# Patient Record
Sex: MALE | Race: WHITE | Employment: OTHER | URBAN - METROPOLITAN AREA
[De-identification: names, ages, dates, MRNs, and addresses within clinical notes are randomized per-mention and may not be internally consistent; named-entity substitution may affect disease eponyms.]

---

## 2020-01-01 ENCOUNTER — APPOINTMENT (OUTPATIENT)
Dept: MRI IMAGING | Age: 80
DRG: 291 | End: 2020-01-01
Attending: INTERNAL MEDICINE
Payer: MEDICARE

## 2020-01-01 ENCOUNTER — APPOINTMENT (OUTPATIENT)
Dept: NON INVASIVE DIAGNOSTICS | Age: 80
DRG: 291 | End: 2020-01-01
Attending: INTERNAL MEDICINE
Payer: MEDICARE

## 2020-01-01 ENCOUNTER — PATIENT OUTREACH (OUTPATIENT)
Dept: CASE MANAGEMENT | Age: 80
End: 2020-01-01

## 2020-01-01 ENCOUNTER — HOME CARE VISIT (OUTPATIENT)
Dept: HOME HEALTH SERVICES | Facility: HOME HEALTH | Age: 80
End: 2020-01-01
Payer: MEDICARE

## 2020-01-01 ENCOUNTER — HOME CARE VISIT (OUTPATIENT)
Dept: SCHEDULING | Facility: HOME HEALTH | Age: 80
End: 2020-01-01
Payer: MEDICARE

## 2020-01-01 ENCOUNTER — HOME HEALTH ADMISSION (OUTPATIENT)
Dept: HOME HEALTH SERVICES | Facility: HOME HEALTH | Age: 80
End: 2020-01-01
Payer: MEDICARE

## 2020-01-01 ENCOUNTER — ANESTHESIA EVENT (OUTPATIENT)
Dept: ENDOSCOPY | Age: 80
DRG: 291 | End: 2020-01-01
Payer: MEDICARE

## 2020-01-01 ENCOUNTER — APPOINTMENT (OUTPATIENT)
Dept: CT IMAGING | Age: 80
DRG: 291 | End: 2020-01-01
Attending: EMERGENCY MEDICINE
Payer: MEDICARE

## 2020-01-01 ENCOUNTER — APPOINTMENT (OUTPATIENT)
Dept: GENERAL RADIOLOGY | Age: 80
DRG: 291 | End: 2020-01-01
Attending: EMERGENCY MEDICINE
Payer: MEDICARE

## 2020-01-01 ENCOUNTER — HOSPITAL ENCOUNTER (INPATIENT)
Age: 80
LOS: 25 days | Discharge: HOME HEALTH | DRG: 291 | End: 2020-11-23
Attending: EMERGENCY MEDICINE | Admitting: INTERNAL MEDICINE
Payer: MEDICARE

## 2020-01-01 ENCOUNTER — ANESTHESIA (OUTPATIENT)
Dept: ENDOSCOPY | Age: 80
DRG: 291 | End: 2020-01-01
Payer: MEDICARE

## 2020-01-01 ENCOUNTER — APPOINTMENT (OUTPATIENT)
Dept: CT IMAGING | Age: 80
DRG: 291 | End: 2020-01-01
Attending: STUDENT IN AN ORGANIZED HEALTH CARE EDUCATION/TRAINING PROGRAM
Payer: MEDICARE

## 2020-01-01 ENCOUNTER — APPOINTMENT (OUTPATIENT)
Dept: ULTRASOUND IMAGING | Age: 80
DRG: 291 | End: 2020-01-01
Attending: INTERNAL MEDICINE
Payer: MEDICARE

## 2020-01-01 ENCOUNTER — TELEPHONE (OUTPATIENT)
Dept: FAMILY MEDICINE CLINIC | Age: 80
End: 2020-01-01

## 2020-01-01 VITALS
WEIGHT: 153 LBS | BODY MASS INDEX: 22.66 KG/M2 | HEART RATE: 111 BPM | SYSTOLIC BLOOD PRESSURE: 123 MMHG | TEMPERATURE: 97.8 F | OXYGEN SATURATION: 93 % | DIASTOLIC BLOOD PRESSURE: 38 MMHG | RESPIRATION RATE: 17 BRPM | HEIGHT: 69 IN

## 2020-01-01 VITALS
TEMPERATURE: 97 F | DIASTOLIC BLOOD PRESSURE: 78 MMHG | RESPIRATION RATE: 18 BRPM | HEART RATE: 69 BPM | OXYGEN SATURATION: 98 % | SYSTOLIC BLOOD PRESSURE: 128 MMHG

## 2020-01-01 DIAGNOSIS — Z71.89 GOALS OF CARE, COUNSELING/DISCUSSION: ICD-10-CM

## 2020-01-01 DIAGNOSIS — R41.0 CONFUSION: ICD-10-CM

## 2020-01-01 DIAGNOSIS — I48.91 ATRIAL FIBRILLATION WITH RAPID VENTRICULAR RESPONSE (HCC): Primary | ICD-10-CM

## 2020-01-01 DIAGNOSIS — R41.82 ALTERED MENTAL STATUS, UNSPECIFIED ALTERED MENTAL STATUS TYPE: ICD-10-CM

## 2020-01-01 DIAGNOSIS — K92.2 GASTROINTESTINAL HEMORRHAGE, UNSPECIFIED GASTROINTESTINAL HEMORRHAGE TYPE: ICD-10-CM

## 2020-01-01 DIAGNOSIS — M79.601 PAIN OF RIGHT UPPER EXTREMITY: ICD-10-CM

## 2020-01-01 LAB
ABO + RH BLD: NORMAL
ALBUMIN SERPL-MCNC: 2.7 G/DL (ref 3.4–5)
ALBUMIN SERPL-MCNC: 2.8 G/DL (ref 3.4–5)
ALBUMIN SERPL-MCNC: 2.9 G/DL (ref 3.4–5)
ALBUMIN SERPL-MCNC: 3 G/DL (ref 3.4–5)
ALBUMIN SERPL-MCNC: 3 G/DL (ref 3.4–5)
ALBUMIN SERPL-MCNC: 3.1 G/DL (ref 3.4–5)
ALBUMIN SERPL-MCNC: 3.6 G/DL (ref 3.4–5)
ALBUMIN/GLOB SERPL: 0.9 {RATIO} (ref 0.8–1.7)
ALBUMIN/GLOB SERPL: 1 {RATIO} (ref 0.8–1.7)
ALBUMIN/GLOB SERPL: 1.1 {RATIO} (ref 0.8–1.7)
ALP SERPL-CCNC: 53 U/L (ref 45–117)
ALP SERPL-CCNC: 53 U/L (ref 45–117)
ALP SERPL-CCNC: 54 U/L (ref 45–117)
ALP SERPL-CCNC: 65 U/L (ref 45–117)
ALP SERPL-CCNC: 73 U/L (ref 45–117)
ALP SERPL-CCNC: 89 U/L (ref 45–117)
ALP SERPL-CCNC: 90 U/L (ref 45–117)
ALT SERPL-CCNC: 13 U/L (ref 16–61)
ALT SERPL-CCNC: 13 U/L (ref 16–61)
ALT SERPL-CCNC: 14 U/L (ref 16–61)
ALT SERPL-CCNC: 16 U/L (ref 16–61)
ALT SERPL-CCNC: 17 U/L (ref 16–61)
ALT SERPL-CCNC: 18 U/L (ref 16–61)
ALT SERPL-CCNC: 20 U/L (ref 16–61)
AMMONIA PLAS-SCNC: 22 UMOL/L (ref 11–32)
AMMONIA PLAS-SCNC: 41 UMOL/L (ref 11–32)
ANION GAP SERPL CALC-SCNC: 4 MMOL/L (ref 3–18)
ANION GAP SERPL CALC-SCNC: 5 MMOL/L (ref 3–18)
ANION GAP SERPL CALC-SCNC: 6 MMOL/L (ref 3–18)
ANION GAP SERPL CALC-SCNC: 7 MMOL/L (ref 3–18)
ANION GAP SERPL CALC-SCNC: 8 MMOL/L (ref 3–18)
APPEARANCE UR: CLEAR
APTT PPP: 23.1 SEC (ref 23–36.4)
AST SERPL-CCNC: 10 U/L (ref 10–38)
AST SERPL-CCNC: 11 U/L (ref 10–38)
AST SERPL-CCNC: 13 U/L (ref 10–38)
AST SERPL-CCNC: 29 U/L (ref 10–38)
AST SERPL-CCNC: 32 U/L (ref 10–38)
AST SERPL-CCNC: 32 U/L (ref 10–38)
AST SERPL-CCNC: 9 U/L (ref 10–38)
ATRIAL RATE: 104 BPM
ATRIAL RATE: 90 BPM
BACTERIA SPEC CULT: NORMAL
BACTERIA URNS QL MICRO: ABNORMAL /HPF
BACTERIA URNS QL MICRO: NEGATIVE /HPF
BASOPHILS # BLD: 0 K/UL (ref 0–0.1)
BASOPHILS NFR BLD: 0 % (ref 0–2)
BASOPHILS NFR BLD: 1 % (ref 0–2)
BILIRUB SERPL-MCNC: 0.6 MG/DL (ref 0.2–1)
BILIRUB SERPL-MCNC: 0.7 MG/DL (ref 0.2–1)
BILIRUB SERPL-MCNC: 0.8 MG/DL (ref 0.2–1)
BILIRUB SERPL-MCNC: 0.8 MG/DL (ref 0.2–1)
BILIRUB SERPL-MCNC: 0.9 MG/DL (ref 0.2–1)
BILIRUB SERPL-MCNC: 0.9 MG/DL (ref 0.2–1)
BILIRUB SERPL-MCNC: 1 MG/DL (ref 0.2–1)
BILIRUB UR QL: NEGATIVE
BLOOD GROUP ANTIBODIES SERPL: NORMAL
BNP SERPL-MCNC: 5543 PG/ML (ref 0–1800)
BNP SERPL-MCNC: 9996 PG/ML (ref 0–1800)
BUN SERPL-MCNC: 17 MG/DL (ref 7–18)
BUN SERPL-MCNC: 18 MG/DL (ref 7–18)
BUN SERPL-MCNC: 18 MG/DL (ref 7–18)
BUN SERPL-MCNC: 23 MG/DL (ref 7–18)
BUN SERPL-MCNC: 24 MG/DL (ref 7–18)
BUN SERPL-MCNC: 32 MG/DL (ref 7–18)
BUN SERPL-MCNC: 36 MG/DL (ref 7–18)
BUN SERPL-MCNC: 38 MG/DL (ref 7–18)
BUN SERPL-MCNC: 42 MG/DL (ref 7–18)
BUN SERPL-MCNC: 42 MG/DL (ref 7–18)
BUN SERPL-MCNC: 43 MG/DL (ref 7–18)
BUN SERPL-MCNC: 43 MG/DL (ref 7–18)
BUN SERPL-MCNC: 44 MG/DL (ref 7–18)
BUN/CREAT SERPL: 14 (ref 12–20)
BUN/CREAT SERPL: 17 (ref 12–20)
BUN/CREAT SERPL: 17 (ref 12–20)
BUN/CREAT SERPL: 19 (ref 12–20)
BUN/CREAT SERPL: 20 (ref 12–20)
BUN/CREAT SERPL: 28 (ref 12–20)
BUN/CREAT SERPL: 29 (ref 12–20)
BUN/CREAT SERPL: 33 (ref 12–20)
BUN/CREAT SERPL: 34 (ref 12–20)
BUN/CREAT SERPL: 35 (ref 12–20)
BUN/CREAT SERPL: 39 (ref 12–20)
BUN/CREAT SERPL: 44 (ref 12–20)
BUN/CREAT SERPL: 44 (ref 12–20)
CALCIUM SERPL-MCNC: 7.9 MG/DL (ref 8.5–10.1)
CALCIUM SERPL-MCNC: 8 MG/DL (ref 8.5–10.1)
CALCIUM SERPL-MCNC: 8.2 MG/DL (ref 8.5–10.1)
CALCIUM SERPL-MCNC: 8.2 MG/DL (ref 8.5–10.1)
CALCIUM SERPL-MCNC: 8.3 MG/DL (ref 8.5–10.1)
CALCIUM SERPL-MCNC: 8.3 MG/DL (ref 8.5–10.1)
CALCIUM SERPL-MCNC: 8.4 MG/DL (ref 8.5–10.1)
CALCIUM SERPL-MCNC: 8.4 MG/DL (ref 8.5–10.1)
CALCIUM SERPL-MCNC: 8.5 MG/DL (ref 8.5–10.1)
CALCIUM SERPL-MCNC: 8.5 MG/DL (ref 8.5–10.1)
CALCIUM SERPL-MCNC: 8.6 MG/DL (ref 8.5–10.1)
CALCIUM SERPL-MCNC: 8.7 MG/DL (ref 8.5–10.1)
CALCIUM SERPL-MCNC: 8.8 MG/DL (ref 8.5–10.1)
CALCULATED R AXIS, ECG10: -15 DEGREES
CALCULATED R AXIS, ECG10: -35 DEGREES
CALCULATED T AXIS, ECG11: 105 DEGREES
CALCULATED T AXIS, ECG11: 82 DEGREES
CC UR VC: NORMAL
CHLORIDE SERPL-SCNC: 104 MMOL/L (ref 100–111)
CHLORIDE SERPL-SCNC: 107 MMOL/L (ref 100–111)
CHLORIDE SERPL-SCNC: 109 MMOL/L (ref 100–111)
CHLORIDE SERPL-SCNC: 110 MMOL/L (ref 100–111)
CHLORIDE SERPL-SCNC: 110 MMOL/L (ref 100–111)
CHLORIDE SERPL-SCNC: 111 MMOL/L (ref 100–111)
CHLORIDE SERPL-SCNC: 111 MMOL/L (ref 100–111)
CHLORIDE SERPL-SCNC: 112 MMOL/L (ref 100–111)
CHLORIDE SERPL-SCNC: 114 MMOL/L (ref 100–111)
CHLORIDE SERPL-SCNC: 115 MMOL/L (ref 100–111)
CHLORIDE SERPL-SCNC: 115 MMOL/L (ref 100–111)
CHOLEST SERPL-MCNC: 103 MG/DL
CHOLEST SERPL-MCNC: 112 MG/DL
CK MB CFR SERPL CALC: 1.1 % (ref 0–4)
CK MB CFR SERPL CALC: 1.4 % (ref 0–4)
CK MB CFR SERPL CALC: ABNORMAL % (ref 0–4)
CK MB CFR SERPL CALC: NORMAL % (ref 0–4)
CK MB SERPL-MCNC: 1 NG/ML (ref 5–25)
CK MB SERPL-MCNC: 1.2 NG/ML (ref 5–25)
CK MB SERPL-MCNC: <1 NG/ML (ref 5–25)
CK MB SERPL-MCNC: <1 NG/ML (ref 5–25)
CK SERPL-CCNC: 149 U/L (ref 39–308)
CK SERPL-CCNC: 39 U/L (ref 39–308)
CK SERPL-CCNC: 83 U/L (ref 39–308)
CK SERPL-CCNC: 88 U/L (ref 39–308)
CO2 SERPL-SCNC: 23 MMOL/L (ref 21–32)
CO2 SERPL-SCNC: 24 MMOL/L (ref 21–32)
CO2 SERPL-SCNC: 25 MMOL/L (ref 21–32)
CO2 SERPL-SCNC: 26 MMOL/L (ref 21–32)
CO2 SERPL-SCNC: 27 MMOL/L (ref 21–32)
CO2 SERPL-SCNC: 28 MMOL/L (ref 21–32)
CO2 SERPL-SCNC: 29 MMOL/L (ref 21–32)
CO2 SERPL-SCNC: 30 MMOL/L (ref 21–32)
COLOR UR: ABNORMAL
COLOR UR: ABNORMAL
COLOR UR: YELLOW
COVID-19 RAPID TEST, COVR: NOT DETECTED
CREAT SERPL-MCNC: 0.97 MG/DL (ref 0.6–1.3)
CREAT SERPL-MCNC: 0.99 MG/DL (ref 0.6–1.3)
CREAT SERPL-MCNC: 1.03 MG/DL (ref 0.6–1.3)
CREAT SERPL-MCNC: 1.04 MG/DL (ref 0.6–1.3)
CREAT SERPL-MCNC: 1.07 MG/DL (ref 0.6–1.3)
CREAT SERPL-MCNC: 1.09 MG/DL (ref 0.6–1.3)
CREAT SERPL-MCNC: 1.11 MG/DL (ref 0.6–1.3)
CREAT SERPL-MCNC: 1.13 MG/DL (ref 0.6–1.3)
CREAT SERPL-MCNC: 1.18 MG/DL (ref 0.6–1.3)
CREAT SERPL-MCNC: 1.21 MG/DL (ref 0.6–1.3)
CREAT SERPL-MCNC: 1.26 MG/DL (ref 0.6–1.3)
CREAT SERPL-MCNC: 1.28 MG/DL (ref 0.6–1.3)
CREAT SERPL-MCNC: 1.37 MG/DL (ref 0.6–1.3)
DIAGNOSIS, 93000: NORMAL
DIAGNOSIS, 93000: NORMAL
DIFFERENTIAL METHOD BLD: ABNORMAL
ECHO AO ROOT DIAM: 3.43 CM
ECHO IVC PROX: 2.6 CM
ECHO LA AREA 4C: 18.4 CM2
ECHO LA VOL 2C: 59.97 ML (ref 18–58)
ECHO LA VOL 4C: 48.45 ML (ref 18–58)
ECHO LA VOL BP: 58.41 ML (ref 18–58)
ECHO LA VOL/BSA BIPLANE: 30.15 ML/M2 (ref 16–28)
ECHO LA VOLUME INDEX A2C: 30.95 ML/M2 (ref 16–28)
ECHO LA VOLUME INDEX A4C: 25.01 ML/M2 (ref 16–28)
ECHO LV INTERNAL DIMENSION DIASTOLIC: 5.02 CM (ref 4.2–5.9)
ECHO LV INTERNAL DIMENSION SYSTOLIC: 3.92 CM
ECHO LV IVSD: 1.22 CM (ref 0.6–1)
ECHO LV MASS 2D: 244.9 G (ref 88–224)
ECHO LV MASS INDEX 2D: 126.4 G/M2 (ref 49–115)
ECHO LV POSTERIOR WALL DIASTOLIC: 1.25 CM (ref 0.6–1)
ECHO LVOT CARDIAC OUTPUT: 4.91 LITER/MINUTE
ECHO LVOT DIAM: 2.02 CM
ECHO LVOT PEAK GRADIENT: 2.34 MMHG
ECHO LVOT PEAK VELOCITY: 76.54 CM/S
ECHO LVOT SV: 42.2 ML
ECHO LVOT VTI: 13.21 CM
ECHO RV TAPSE: 1.55 CM (ref 1.5–2)
ECHO TV REGURGITANT MAX VELOCITY: 309.34 CM/S
ECHO TV REGURGITANT PEAK GRADIENT: 38.28 MMHG
EOSINOPHIL # BLD: 0 K/UL (ref 0–0.4)
EOSINOPHIL # BLD: 0 K/UL (ref 0–0.4)
EOSINOPHIL # BLD: 0.1 K/UL (ref 0–0.4)
EOSINOPHIL # BLD: 0.1 K/UL (ref 0–0.4)
EOSINOPHIL # BLD: 0.2 K/UL (ref 0–0.4)
EOSINOPHIL # BLD: 0.3 K/UL (ref 0–0.4)
EOSINOPHIL NFR BLD: 0 % (ref 0–5)
EOSINOPHIL NFR BLD: 0 % (ref 0–5)
EOSINOPHIL NFR BLD: 1 % (ref 0–5)
EOSINOPHIL NFR BLD: 2 % (ref 0–5)
EOSINOPHIL NFR BLD: 2 % (ref 0–5)
EOSINOPHIL NFR BLD: 3 % (ref 0–5)
EOSINOPHIL NFR BLD: 4 % (ref 0–5)
EOSINOPHIL NFR BLD: 5 % (ref 0–5)
EPITH CASTS URNS QL MICRO: ABNORMAL /LPF (ref 0–5)
EPITH CASTS URNS QL MICRO: NEGATIVE /LPF (ref 0–5)
EPITH CASTS URNS QL MICRO: NORMAL /LPF (ref 0–5)
ERYTHROCYTE [DISTWIDTH] IN BLOOD BY AUTOMATED COUNT: 12.7 % (ref 11.6–14.5)
ERYTHROCYTE [DISTWIDTH] IN BLOOD BY AUTOMATED COUNT: 12.7 % (ref 11.6–14.5)
ERYTHROCYTE [DISTWIDTH] IN BLOOD BY AUTOMATED COUNT: 12.8 % (ref 11.6–14.5)
ERYTHROCYTE [DISTWIDTH] IN BLOOD BY AUTOMATED COUNT: 13 % (ref 11.6–14.5)
ERYTHROCYTE [DISTWIDTH] IN BLOOD BY AUTOMATED COUNT: 13.1 % (ref 11.6–14.5)
ERYTHROCYTE [DISTWIDTH] IN BLOOD BY AUTOMATED COUNT: 13.1 % (ref 11.6–14.5)
ERYTHROCYTE [DISTWIDTH] IN BLOOD BY AUTOMATED COUNT: 13.4 % (ref 11.6–14.5)
ERYTHROCYTE [DISTWIDTH] IN BLOOD BY AUTOMATED COUNT: 13.9 % (ref 11.6–14.5)
ERYTHROCYTE [DISTWIDTH] IN BLOOD BY AUTOMATED COUNT: 14.4 % (ref 11.6–14.5)
ERYTHROCYTE [DISTWIDTH] IN BLOOD BY AUTOMATED COUNT: 15 % (ref 11.6–14.5)
ERYTHROCYTE [DISTWIDTH] IN BLOOD BY AUTOMATED COUNT: 15.1 % (ref 11.6–14.5)
ERYTHROCYTE [DISTWIDTH] IN BLOOD BY AUTOMATED COUNT: 15.3 % (ref 11.6–14.5)
ETHANOL SERPL-MCNC: 9 MG/DL (ref 0–3)
ETHANOL SERPL-MCNC: <3 MG/DL (ref 0–3)
FERRITIN SERPL-MCNC: 63 NG/ML (ref 8–388)
FOLATE SERPL-MCNC: 13.1 NG/ML (ref 3.1–17.5)
FOLATE SERPL-MCNC: 19.3 NG/ML (ref 3.1–17.5)
GLOBULIN SER CALC-MCNC: 2.8 G/DL (ref 2–4)
GLOBULIN SER CALC-MCNC: 3.1 G/DL (ref 2–4)
GLOBULIN SER CALC-MCNC: 3.3 G/DL (ref 2–4)
GLUCOSE BLD STRIP.AUTO-MCNC: 103 MG/DL (ref 70–110)
GLUCOSE BLD STRIP.AUTO-MCNC: 94 MG/DL (ref 70–110)
GLUCOSE SERPL-MCNC: 102 MG/DL (ref 74–99)
GLUCOSE SERPL-MCNC: 113 MG/DL (ref 74–99)
GLUCOSE SERPL-MCNC: 115 MG/DL (ref 74–99)
GLUCOSE SERPL-MCNC: 120 MG/DL (ref 74–99)
GLUCOSE SERPL-MCNC: 120 MG/DL (ref 74–99)
GLUCOSE SERPL-MCNC: 121 MG/DL (ref 74–99)
GLUCOSE SERPL-MCNC: 88 MG/DL (ref 74–99)
GLUCOSE SERPL-MCNC: 88 MG/DL (ref 74–99)
GLUCOSE SERPL-MCNC: 92 MG/DL (ref 74–99)
GLUCOSE SERPL-MCNC: 94 MG/DL (ref 74–99)
GLUCOSE SERPL-MCNC: 94 MG/DL (ref 74–99)
GLUCOSE SERPL-MCNC: 95 MG/DL (ref 74–99)
GLUCOSE SERPL-MCNC: 97 MG/DL (ref 74–99)
GLUCOSE UR STRIP.AUTO-MCNC: NEGATIVE MG/DL
HBA1C MFR BLD: 5.2 % (ref 4.2–5.6)
HCT VFR BLD AUTO: 25.4 % (ref 36–48)
HCT VFR BLD AUTO: 26.2 % (ref 36–48)
HCT VFR BLD AUTO: 26.3 % (ref 36–48)
HCT VFR BLD AUTO: 26.5 % (ref 36–48)
HCT VFR BLD AUTO: 26.6 % (ref 36–48)
HCT VFR BLD AUTO: 27.2 % (ref 36–48)
HCT VFR BLD AUTO: 27.3 % (ref 36–48)
HCT VFR BLD AUTO: 27.5 % (ref 36–48)
HCT VFR BLD AUTO: 28 % (ref 36–48)
HCT VFR BLD AUTO: 28.1 % (ref 36–48)
HCT VFR BLD AUTO: 28.5 % (ref 36–48)
HCT VFR BLD AUTO: 28.8 % (ref 36–48)
HCT VFR BLD AUTO: 28.9 % (ref 36–48)
HCT VFR BLD AUTO: 29.5 % (ref 36–48)
HCT VFR BLD AUTO: 30 % (ref 36–48)
HCT VFR BLD AUTO: 30.7 % (ref 36–48)
HCT VFR BLD AUTO: 31.5 % (ref 36–48)
HCT VFR BLD AUTO: 32.4 % (ref 36–48)
HCT VFR BLD AUTO: 36.7 % (ref 36–48)
HCT VFR BLD AUTO: 37.7 % (ref 36–48)
HCT VFR BLD AUTO: 39.9 % (ref 36–48)
HCT VFR BLD AUTO: 43.6 % (ref 36–48)
HDLC SERPL-MCNC: 34 MG/DL (ref 40–60)
HDLC SERPL-MCNC: 36 MG/DL (ref 40–60)
HDLC SERPL: 3 {RATIO} (ref 0–5)
HDLC SERPL: 3.1 {RATIO} (ref 0–5)
HEMOCCULT STL QL: POSITIVE
HGB BLD-MCNC: 10 G/DL (ref 13–16)
HGB BLD-MCNC: 11.8 G/DL (ref 13–16)
HGB BLD-MCNC: 12.1 G/DL (ref 13–16)
HGB BLD-MCNC: 12.6 G/DL (ref 13–16)
HGB BLD-MCNC: 13.1 G/DL (ref 13–16)
HGB BLD-MCNC: 8.2 G/DL (ref 13–16)
HGB BLD-MCNC: 8.3 G/DL (ref 13–16)
HGB BLD-MCNC: 8.4 G/DL (ref 13–16)
HGB BLD-MCNC: 8.5 G/DL (ref 13–16)
HGB BLD-MCNC: 8.7 G/DL (ref 13–16)
HGB BLD-MCNC: 8.7 G/DL (ref 13–16)
HGB BLD-MCNC: 8.8 G/DL (ref 13–16)
HGB BLD-MCNC: 8.9 G/DL (ref 13–16)
HGB BLD-MCNC: 9 G/DL (ref 13–16)
HGB BLD-MCNC: 9.2 G/DL (ref 13–16)
HGB BLD-MCNC: 9.4 G/DL (ref 13–16)
HGB BLD-MCNC: 9.4 G/DL (ref 13–16)
HGB BLD-MCNC: 9.7 G/DL (ref 13–16)
HGB UR QL STRIP: ABNORMAL
HGB UR QL STRIP: ABNORMAL
HGB UR QL STRIP: NEGATIVE
HYALINE CASTS URNS QL MICRO: ABNORMAL /LPF (ref 0–2)
INR PPP: 1.2 (ref 0.8–1.2)
IRON SATN MFR SERPL: 21 % (ref 20–50)
IRON SERPL-MCNC: 48 UG/DL (ref 50–175)
KETONES UR QL STRIP.AUTO: ABNORMAL MG/DL
KETONES UR QL STRIP.AUTO: ABNORMAL MG/DL
KETONES UR QL STRIP.AUTO: NEGATIVE MG/DL
LDLC SERPL CALC-MCNC: 56.8 MG/DL (ref 0–100)
LDLC SERPL CALC-MCNC: 63.4 MG/DL (ref 0–100)
LEUKOCYTE ESTERASE UR QL STRIP.AUTO: ABNORMAL
LEUKOCYTE ESTERASE UR QL STRIP.AUTO: ABNORMAL
LEUKOCYTE ESTERASE UR QL STRIP.AUTO: NEGATIVE
LIPID PROFILE,FLP: ABNORMAL
LIPID PROFILE,FLP: ABNORMAL
LVOT MG: 0.99 MMHG
LYMPHOCYTES # BLD: 1.3 K/UL (ref 0.9–3.6)
LYMPHOCYTES # BLD: 1.3 K/UL (ref 0.9–3.6)
LYMPHOCYTES # BLD: 1.6 K/UL (ref 0.9–3.6)
LYMPHOCYTES # BLD: 1.7 K/UL (ref 0.9–3.6)
LYMPHOCYTES # BLD: 1.8 K/UL (ref 0.9–3.6)
LYMPHOCYTES # BLD: 1.9 K/UL (ref 0.9–3.6)
LYMPHOCYTES # BLD: 2 K/UL (ref 0.9–3.6)
LYMPHOCYTES # BLD: 2 K/UL (ref 0.9–3.6)
LYMPHOCYTES NFR BLD: 18 % (ref 21–52)
LYMPHOCYTES NFR BLD: 18 % (ref 21–52)
LYMPHOCYTES NFR BLD: 20 % (ref 21–52)
LYMPHOCYTES NFR BLD: 21 % (ref 21–52)
LYMPHOCYTES NFR BLD: 21 % (ref 21–52)
LYMPHOCYTES NFR BLD: 26 % (ref 21–52)
LYMPHOCYTES NFR BLD: 27 % (ref 21–52)
LYMPHOCYTES NFR BLD: 33 % (ref 21–52)
MAGNESIUM SERPL-MCNC: 2.2 MG/DL (ref 1.6–2.6)
MAGNESIUM SERPL-MCNC: 2.3 MG/DL (ref 1.6–2.6)
MAGNESIUM SERPL-MCNC: 2.4 MG/DL (ref 1.6–2.6)
MCH RBC QN AUTO: 29.7 PG (ref 24–34)
MCH RBC QN AUTO: 29.7 PG (ref 24–34)
MCH RBC QN AUTO: 29.8 PG (ref 24–34)
MCH RBC QN AUTO: 30.6 PG (ref 24–34)
MCH RBC QN AUTO: 30.8 PG (ref 24–34)
MCH RBC QN AUTO: 31.1 PG (ref 24–34)
MCH RBC QN AUTO: 31.3 PG (ref 24–34)
MCH RBC QN AUTO: 31.4 PG (ref 24–34)
MCH RBC QN AUTO: 31.5 PG (ref 24–34)
MCH RBC QN AUTO: 31.7 PG (ref 24–34)
MCHC RBC AUTO-ENTMCNC: 29.9 G/DL (ref 31–37)
MCHC RBC AUTO-ENTMCNC: 30 G/DL (ref 31–37)
MCHC RBC AUTO-ENTMCNC: 30 G/DL (ref 31–37)
MCHC RBC AUTO-ENTMCNC: 30.9 G/DL (ref 31–37)
MCHC RBC AUTO-ENTMCNC: 30.9 G/DL (ref 31–37)
MCHC RBC AUTO-ENTMCNC: 31 G/DL (ref 31–37)
MCHC RBC AUTO-ENTMCNC: 31.3 G/DL (ref 31–37)
MCHC RBC AUTO-ENTMCNC: 31.4 G/DL (ref 31–37)
MCHC RBC AUTO-ENTMCNC: 31.6 G/DL (ref 31–37)
MCHC RBC AUTO-ENTMCNC: 31.7 G/DL (ref 31–37)
MCHC RBC AUTO-ENTMCNC: 32.1 G/DL (ref 31–37)
MCHC RBC AUTO-ENTMCNC: 32.2 G/DL (ref 31–37)
MCV RBC AUTO: 100 FL (ref 74–97)
MCV RBC AUTO: 100.5 FL (ref 74–97)
MCV RBC AUTO: 102.2 FL (ref 74–97)
MCV RBC AUTO: 102.5 FL (ref 74–97)
MCV RBC AUTO: 97.8 FL (ref 74–97)
MCV RBC AUTO: 97.9 FL (ref 74–97)
MCV RBC AUTO: 97.9 FL (ref 74–97)
MCV RBC AUTO: 98.7 FL (ref 74–97)
MCV RBC AUTO: 98.9 FL (ref 74–97)
MCV RBC AUTO: 99 FL (ref 74–97)
MCV RBC AUTO: 99.1 FL (ref 74–97)
MCV RBC AUTO: 99.3 FL (ref 74–97)
MONOCYTES # BLD: 0.5 K/UL (ref 0.05–1.2)
MONOCYTES # BLD: 0.6 K/UL (ref 0.05–1.2)
MONOCYTES # BLD: 0.7 K/UL (ref 0.05–1.2)
MONOCYTES # BLD: 0.8 K/UL (ref 0.05–1.2)
MONOCYTES NFR BLD: 10 % (ref 3–10)
MONOCYTES NFR BLD: 7 % (ref 3–10)
MONOCYTES NFR BLD: 7 % (ref 3–10)
MONOCYTES NFR BLD: 8 % (ref 3–10)
MONOCYTES NFR BLD: 9 % (ref 3–10)
MONOCYTES NFR BLD: 9 % (ref 3–10)
MUCOUS THREADS URNS QL MICRO: ABNORMAL /LPF
NEUTS SEG # BLD: 3.3 K/UL (ref 1.8–8)
NEUTS SEG # BLD: 4.1 K/UL (ref 1.8–8)
NEUTS SEG # BLD: 4.2 K/UL (ref 1.8–8)
NEUTS SEG # BLD: 4.6 K/UL (ref 1.8–8)
NEUTS SEG # BLD: 5.2 K/UL (ref 1.8–8)
NEUTS SEG # BLD: 5.4 K/UL (ref 1.8–8)
NEUTS SEG # BLD: 6.5 K/UL (ref 1.8–8)
NEUTS SEG # BLD: 7.3 K/UL (ref 1.8–8)
NEUTS SEG NFR BLD: 54 % (ref 40–73)
NEUTS SEG NFR BLD: 60 % (ref 40–73)
NEUTS SEG NFR BLD: 61 % (ref 40–73)
NEUTS SEG NFR BLD: 64 % (ref 40–73)
NEUTS SEG NFR BLD: 69 % (ref 40–73)
NEUTS SEG NFR BLD: 69 % (ref 40–73)
NEUTS SEG NFR BLD: 73 % (ref 40–73)
NEUTS SEG NFR BLD: 75 % (ref 40–73)
NITRITE UR QL STRIP.AUTO: NEGATIVE
PH UR STRIP: 5 [PH] (ref 5–8)
PH UR STRIP: 5 [PH] (ref 5–8)
PH UR STRIP: 7 [PH] (ref 5–8)
PHOSPHATE SERPL-MCNC: 3.2 MG/DL (ref 2.5–4.9)
PHOSPHATE SERPL-MCNC: 3.7 MG/DL (ref 2.5–4.9)
PLATELET # BLD AUTO: 121 K/UL (ref 135–420)
PLATELET # BLD AUTO: 157 K/UL (ref 135–420)
PLATELET # BLD AUTO: 173 K/UL (ref 135–420)
PLATELET # BLD AUTO: 200 K/UL (ref 135–420)
PLATELET # BLD AUTO: 201 K/UL (ref 135–420)
PLATELET # BLD AUTO: 202 K/UL (ref 135–420)
PLATELET # BLD AUTO: 208 K/UL (ref 135–420)
PLATELET # BLD AUTO: 221 K/UL (ref 135–420)
PLATELET # BLD AUTO: 221 K/UL (ref 135–420)
PLATELET # BLD AUTO: 225 K/UL (ref 135–420)
PLATELET # BLD AUTO: 227 K/UL (ref 135–420)
PLATELET # BLD AUTO: 241 K/UL (ref 135–420)
PMV BLD AUTO: 11.2 FL (ref 9.2–11.8)
PMV BLD AUTO: 11.3 FL (ref 9.2–11.8)
PMV BLD AUTO: 11.6 FL (ref 9.2–11.8)
PMV BLD AUTO: 11.6 FL (ref 9.2–11.8)
PMV BLD AUTO: 11.7 FL (ref 9.2–11.8)
PMV BLD AUTO: 11.8 FL (ref 9.2–11.8)
PMV BLD AUTO: 11.8 FL (ref 9.2–11.8)
PMV BLD AUTO: 12 FL (ref 9.2–11.8)
PMV BLD AUTO: 12.1 FL (ref 9.2–11.8)
PMV BLD AUTO: 12.2 FL (ref 9.2–11.8)
PMV BLD AUTO: 12.2 FL (ref 9.2–11.8)
PMV BLD AUTO: 12.3 FL (ref 9.2–11.8)
POTASSIUM SERPL-SCNC: 3.5 MMOL/L (ref 3.5–5.5)
POTASSIUM SERPL-SCNC: 3.7 MMOL/L (ref 3.5–5.5)
POTASSIUM SERPL-SCNC: 3.8 MMOL/L (ref 3.5–5.5)
POTASSIUM SERPL-SCNC: 3.8 MMOL/L (ref 3.5–5.5)
POTASSIUM SERPL-SCNC: 3.9 MMOL/L (ref 3.5–5.5)
POTASSIUM SERPL-SCNC: 4 MMOL/L (ref 3.5–5.5)
POTASSIUM SERPL-SCNC: 4.3 MMOL/L (ref 3.5–5.5)
POTASSIUM SERPL-SCNC: 4.5 MMOL/L (ref 3.5–5.5)
POTASSIUM SERPL-SCNC: 4.5 MMOL/L (ref 3.5–5.5)
POTASSIUM SERPL-SCNC: 4.6 MMOL/L (ref 3.5–5.5)
PROT SERPL-MCNC: 5.6 G/DL (ref 6.4–8.2)
PROT SERPL-MCNC: 5.8 G/DL (ref 6.4–8.2)
PROT SERPL-MCNC: 6.1 G/DL (ref 6.4–8.2)
PROT SERPL-MCNC: 6.1 G/DL (ref 6.4–8.2)
PROT SERPL-MCNC: 6.2 G/DL (ref 6.4–8.2)
PROT SERPL-MCNC: 6.4 G/DL (ref 6.4–8.2)
PROT SERPL-MCNC: 6.9 G/DL (ref 6.4–8.2)
PROT UR STRIP-MCNC: ABNORMAL MG/DL
PROT UR STRIP-MCNC: ABNORMAL MG/DL
PROT UR STRIP-MCNC: NEGATIVE MG/DL
PROTHROMBIN TIME: 14.6 SEC (ref 11.5–15.2)
Q-T INTERVAL, ECG07: 302 MS
Q-T INTERVAL, ECG07: 318 MS
QRS DURATION, ECG06: 102 MS
QRS DURATION, ECG06: 90 MS
QTC CALCULATION (BEZET), ECG08: 443 MS
QTC CALCULATION (BEZET), ECG08: 470 MS
RBC # BLD AUTO: 2.72 M/UL (ref 4.7–5.5)
RBC # BLD AUTO: 2.79 M/UL (ref 4.7–5.5)
RBC # BLD AUTO: 2.81 M/UL (ref 4.7–5.5)
RBC # BLD AUTO: 2.84 M/UL (ref 4.7–5.5)
RBC # BLD AUTO: 2.86 M/UL (ref 4.7–5.5)
RBC # BLD AUTO: 3.1 M/UL (ref 4.7–5.5)
RBC # BLD AUTO: 3.22 M/UL (ref 4.7–5.5)
RBC # BLD AUTO: 3.27 M/UL (ref 4.7–5.5)
RBC # BLD AUTO: 3.72 M/UL (ref 4.7–5.5)
RBC # BLD AUTO: 3.85 M/UL (ref 4.7–5.5)
RBC # BLD AUTO: 3.97 M/UL (ref 4.7–5.5)
RBC # BLD AUTO: 4.39 M/UL (ref 4.7–5.5)
RBC #/AREA URNS HPF: ABNORMAL /HPF (ref 0–5)
RBC #/AREA URNS HPF: NORMAL /HPF (ref 0–5)
RBC #/AREA URNS HPF: NORMAL /HPF (ref 0–5)
SERVICE CMNT-IMP: NORMAL
SODIUM SERPL-SCNC: 137 MMOL/L (ref 136–145)
SODIUM SERPL-SCNC: 140 MMOL/L (ref 136–145)
SODIUM SERPL-SCNC: 142 MMOL/L (ref 136–145)
SODIUM SERPL-SCNC: 143 MMOL/L (ref 136–145)
SODIUM SERPL-SCNC: 143 MMOL/L (ref 136–145)
SODIUM SERPL-SCNC: 144 MMOL/L (ref 136–145)
SODIUM SERPL-SCNC: 144 MMOL/L (ref 136–145)
SODIUM SERPL-SCNC: 145 MMOL/L (ref 136–145)
SODIUM SERPL-SCNC: 147 MMOL/L (ref 136–145)
SODIUM SERPL-SCNC: 149 MMOL/L (ref 136–145)
SOURCE, COVRS: NORMAL
SP GR UR REFRACTOMETRY: 1.02 (ref 1–1.03)
SPECIMEN EXP DATE BLD: NORMAL
SPECIMEN TYPE, XMCV1T: NORMAL
TIBC SERPL-MCNC: 232 UG/DL (ref 250–450)
TRIGL SERPL-MCNC: 61 MG/DL (ref ?–150)
TRIGL SERPL-MCNC: 63 MG/DL (ref ?–150)
TROPONIN I SERPL-MCNC: 0.03 NG/ML (ref 0–0.04)
TROPONIN I SERPL-MCNC: 0.03 NG/ML (ref 0–0.04)
TROPONIN I SERPL-MCNC: 0.04 NG/ML (ref 0–0.04)
TROPONIN I SERPL-MCNC: 0.05 NG/ML (ref 0–0.04)
TSH SERPL DL<=0.05 MIU/L-ACNC: 2.45 UIU/ML (ref 0.36–3.74)
UROBILINOGEN UR QL STRIP.AUTO: 1 EU/DL (ref 0.2–1)
VENTRICULAR RATE, ECG03: 117 BPM
VENTRICULAR RATE, ECG03: 146 BPM
VIT B12 SERPL-MCNC: 274 PG/ML (ref 211–911)
VIT B12 SERPL-MCNC: 311 PG/ML (ref 211–911)
VLDLC SERPL CALC-MCNC: 12.2 MG/DL
VLDLC SERPL CALC-MCNC: 12.6 MG/DL
WBC # BLD AUTO: 5.8 K/UL (ref 4.6–13.2)
WBC # BLD AUTO: 6.1 K/UL (ref 4.6–13.2)
WBC # BLD AUTO: 6.5 K/UL (ref 4.6–13.2)
WBC # BLD AUTO: 6.6 K/UL (ref 4.6–13.2)
WBC # BLD AUTO: 7.3 K/UL (ref 4.6–13.2)
WBC # BLD AUTO: 7.4 K/UL (ref 4.6–13.2)
WBC # BLD AUTO: 7.5 K/UL (ref 4.6–13.2)
WBC # BLD AUTO: 8 K/UL (ref 4.6–13.2)
WBC # BLD AUTO: 8.5 K/UL (ref 4.6–13.2)
WBC # BLD AUTO: 9.1 K/UL (ref 4.6–13.2)
WBC # BLD AUTO: 9.3 K/UL (ref 4.6–13.2)
WBC # BLD AUTO: 9.9 K/UL (ref 4.6–13.2)
WBC URNS QL MICRO: ABNORMAL /HPF (ref 0–4)
WBC URNS QL MICRO: NEGATIVE /HPF (ref 0–4)

## 2020-01-01 PROCEDURE — 74011250636 HC RX REV CODE- 250/636: Performed by: HOSPITALIST

## 2020-01-01 PROCEDURE — 85025 COMPLETE CBC W/AUTO DIFF WBC: CPT

## 2020-01-01 PROCEDURE — 80048 BASIC METABOLIC PNL TOTAL CA: CPT

## 2020-01-01 PROCEDURE — 74011250636 HC RX REV CODE- 250/636: Performed by: INTERNAL MEDICINE

## 2020-01-01 PROCEDURE — 99232 SBSQ HOSP IP/OBS MODERATE 35: CPT | Performed by: HOSPITALIST

## 2020-01-01 PROCEDURE — 2709999900 HC NON-CHARGEABLE SUPPLY

## 2020-01-01 PROCEDURE — 74011250637 HC RX REV CODE- 250/637: Performed by: INTERNAL MEDICINE

## 2020-01-01 PROCEDURE — 83735 ASSAY OF MAGNESIUM: CPT

## 2020-01-01 PROCEDURE — 65660000000 HC RM CCU STEPDOWN

## 2020-01-01 PROCEDURE — 97535 SELF CARE MNGMENT TRAINING: CPT

## 2020-01-01 PROCEDURE — 3331090001 HH PPS REVENUE CREDIT

## 2020-01-01 PROCEDURE — 99231 SBSQ HOSP IP/OBS SF/LOW 25: CPT | Performed by: EMERGENCY MEDICINE

## 2020-01-01 PROCEDURE — 80307 DRUG TEST PRSMV CHEM ANLYZR: CPT

## 2020-01-01 PROCEDURE — 74011250636 HC RX REV CODE- 250/636: Performed by: FAMILY MEDICINE

## 2020-01-01 PROCEDURE — 77030008565 HC TBNG SUC IRR ERBE -B: Performed by: INTERNAL MEDICINE

## 2020-01-01 PROCEDURE — 77030040393 HC DRSG OPTIFOAM GENT MDII -B

## 2020-01-01 PROCEDURE — 87635 SARS-COV-2 COVID-19 AMP PRB: CPT

## 2020-01-01 PROCEDURE — 3331090002 HH PPS REVENUE DEBIT

## 2020-01-01 PROCEDURE — 00731 ANES UPR GI NDSC PX NOS: CPT | Performed by: NURSE ANESTHETIST, CERTIFIED REGISTERED

## 2020-01-01 PROCEDURE — C9113 INJ PANTOPRAZOLE SODIUM, VIA: HCPCS | Performed by: PHYSICIAN ASSISTANT

## 2020-01-01 PROCEDURE — 74011000250 HC RX REV CODE- 250: Performed by: PHYSICIAN ASSISTANT

## 2020-01-01 PROCEDURE — 74011250637 HC RX REV CODE- 250/637: Performed by: HOSPITALIST

## 2020-01-01 PROCEDURE — 36415 COLL VENOUS BLD VENIPUNCTURE: CPT

## 2020-01-01 PROCEDURE — 99231 SBSQ HOSP IP/OBS SF/LOW 25: CPT | Performed by: NURSE PRACTITIONER

## 2020-01-01 PROCEDURE — 99285 EMERGENCY DEPT VISIT HI MDM: CPT

## 2020-01-01 PROCEDURE — 76060000032 HC ANESTHESIA 0.5 TO 1 HR: Performed by: INTERNAL MEDICINE

## 2020-01-01 PROCEDURE — 85027 COMPLETE CBC AUTOMATED: CPT

## 2020-01-01 PROCEDURE — 90686 IIV4 VACC NO PRSV 0.5 ML IM: CPT | Performed by: INTERNAL MEDICINE

## 2020-01-01 PROCEDURE — 99233 SBSQ HOSP IP/OBS HIGH 50: CPT | Performed by: INTERNAL MEDICINE

## 2020-01-01 PROCEDURE — 99223 1ST HOSP IP/OBS HIGH 75: CPT | Performed by: NURSE PRACTITIONER

## 2020-01-01 PROCEDURE — 97164 PT RE-EVAL EST PLAN CARE: CPT

## 2020-01-01 PROCEDURE — 99232 SBSQ HOSP IP/OBS MODERATE 35: CPT | Performed by: INTERNAL MEDICINE

## 2020-01-01 PROCEDURE — 74011250636 HC RX REV CODE- 250/636: Performed by: NURSE ANESTHETIST, CERTIFIED REGISTERED

## 2020-01-01 PROCEDURE — 97530 THERAPEUTIC ACTIVITIES: CPT

## 2020-01-01 PROCEDURE — 74011250637 HC RX REV CODE- 250/637: Performed by: PHYSICIAN ASSISTANT

## 2020-01-01 PROCEDURE — 84100 ASSAY OF PHOSPHORUS: CPT

## 2020-01-01 PROCEDURE — 93306 TTE W/DOPPLER COMPLETE: CPT

## 2020-01-01 PROCEDURE — 80053 COMPREHEN METABOLIC PANEL: CPT

## 2020-01-01 PROCEDURE — 99223 1ST HOSP IP/OBS HIGH 75: CPT | Performed by: PHYSICIAN ASSISTANT

## 2020-01-01 PROCEDURE — 97168 OT RE-EVAL EST PLAN CARE: CPT

## 2020-01-01 PROCEDURE — 65660000004 HC RM CVT STEPDOWN

## 2020-01-01 PROCEDURE — 65270000029 HC RM PRIVATE

## 2020-01-01 PROCEDURE — 97116 GAIT TRAINING THERAPY: CPT

## 2020-01-01 PROCEDURE — 74011000258 HC RX REV CODE- 258: Performed by: PHYSICIAN ASSISTANT

## 2020-01-01 PROCEDURE — 82607 VITAMIN B-12: CPT

## 2020-01-01 PROCEDURE — 74011000250 HC RX REV CODE- 250: Performed by: NURSE ANESTHETIST, CERTIFIED REGISTERED

## 2020-01-01 PROCEDURE — 81001 URINALYSIS AUTO W/SCOPE: CPT

## 2020-01-01 PROCEDURE — 74011250636 HC RX REV CODE- 250/636: Performed by: EMERGENCY MEDICINE

## 2020-01-01 PROCEDURE — 90471 IMMUNIZATION ADMIN: CPT

## 2020-01-01 PROCEDURE — 85018 HEMOGLOBIN: CPT

## 2020-01-01 PROCEDURE — 77030041076 HC DRSG AG OPTICELL MDII -A

## 2020-01-01 PROCEDURE — 400013 HH SOC

## 2020-01-01 PROCEDURE — 80061 LIPID PANEL: CPT

## 2020-01-01 PROCEDURE — A6216 NON-STERILE GAUZE<=16 SQ IN: HCPCS

## 2020-01-01 PROCEDURE — A6260 WOUND CLEANSER ANY TYPE/SIZE: HCPCS

## 2020-01-01 PROCEDURE — 73060 X-RAY EXAM OF HUMERUS: CPT

## 2020-01-01 PROCEDURE — 97165 OT EVAL LOW COMPLEX 30 MIN: CPT

## 2020-01-01 PROCEDURE — 86900 BLOOD TYPING SEROLOGIC ABO: CPT

## 2020-01-01 PROCEDURE — A6212 FOAM DRG <=16 SQ IN W/BORDER: HCPCS

## 2020-01-01 PROCEDURE — 82962 GLUCOSE BLOOD TEST: CPT

## 2020-01-01 PROCEDURE — 99239 HOSP IP/OBS DSCHRG MGMT >30: CPT | Performed by: EMERGENCY MEDICINE

## 2020-01-01 PROCEDURE — 99232 SBSQ HOSP IP/OBS MODERATE 35: CPT | Performed by: EMERGENCY MEDICINE

## 2020-01-01 PROCEDURE — 82728 ASSAY OF FERRITIN: CPT

## 2020-01-01 PROCEDURE — 82550 ASSAY OF CK (CPK): CPT

## 2020-01-01 PROCEDURE — 85610 PROTHROMBIN TIME: CPT

## 2020-01-01 PROCEDURE — 94762 N-INVAS EAR/PLS OXIMTRY CONT: CPT

## 2020-01-01 PROCEDURE — 83036 HEMOGLOBIN GLYCOSYLATED A1C: CPT

## 2020-01-01 PROCEDURE — G0299 HHS/HOSPICE OF RN EA 15 MIN: HCPCS

## 2020-01-01 PROCEDURE — 99100 ANES PT EXTEME AGE<1 YR&>70: CPT | Performed by: NURSE ANESTHETIST, CERTIFIED REGISTERED

## 2020-01-01 PROCEDURE — 0DB78ZX EXCISION OF STOMACH, PYLORUS, VIA NATURAL OR ARTIFICIAL OPENING ENDOSCOPIC, DIAGNOSTIC: ICD-10-PCS | Performed by: INTERNAL MEDICINE

## 2020-01-01 PROCEDURE — 85730 THROMBOPLASTIN TIME PARTIAL: CPT

## 2020-01-01 PROCEDURE — 74011000250 HC RX REV CODE- 250

## 2020-01-01 PROCEDURE — 74011000250 HC RX REV CODE- 250: Performed by: EMERGENCY MEDICINE

## 2020-01-01 PROCEDURE — 74011000258 HC RX REV CODE- 258: Performed by: INTERNAL MEDICINE

## 2020-01-01 PROCEDURE — 82140 ASSAY OF AMMONIA: CPT

## 2020-01-01 PROCEDURE — 96374 THER/PROPH/DIAG INJ IV PUSH: CPT

## 2020-01-01 PROCEDURE — 2709999900 HC NON-CHARGEABLE SUPPLY: Performed by: INTERNAL MEDICINE

## 2020-01-01 PROCEDURE — 88342 IMHCHEM/IMCYTCHM 1ST ANTB: CPT

## 2020-01-01 PROCEDURE — 83880 ASSAY OF NATRIURETIC PEPTIDE: CPT

## 2020-01-01 PROCEDURE — 77030027138 HC INCENT SPIROMETER -A

## 2020-01-01 PROCEDURE — 93005 ELECTROCARDIOGRAM TRACING: CPT

## 2020-01-01 PROCEDURE — A6240 HYDROCOLLD DRG FILLER PASTE: HCPCS

## 2020-01-01 PROCEDURE — 74011000258 HC RX REV CODE- 258: Performed by: EMERGENCY MEDICINE

## 2020-01-01 PROCEDURE — 77030003657 HC NDL SCLER BSC -B: Performed by: INTERNAL MEDICINE

## 2020-01-01 PROCEDURE — 99232 SBSQ HOSP IP/OBS MODERATE 35: CPT | Performed by: NURSE PRACTITIONER

## 2020-01-01 PROCEDURE — 88305 TISSUE EXAM BY PATHOLOGIST: CPT

## 2020-01-01 PROCEDURE — 97110 THERAPEUTIC EXERCISES: CPT

## 2020-01-01 PROCEDURE — 77030010857 HC CATH PRB GLD BSC -C: Performed by: INTERNAL MEDICINE

## 2020-01-01 PROCEDURE — 97161 PT EVAL LOW COMPLEX 20 MIN: CPT

## 2020-01-01 PROCEDURE — A4927 NON-STERILE GLOVES: HCPCS

## 2020-01-01 PROCEDURE — 99100 ANES PT EXTEME AGE<1 YR&>70: CPT | Performed by: ANESTHESIOLOGY

## 2020-01-01 PROCEDURE — 76040000007: Performed by: INTERNAL MEDICINE

## 2020-01-01 PROCEDURE — 77030038554 HC DRSG WND THERAHONEY MDII -Z

## 2020-01-01 PROCEDURE — 51798 US URINE CAPACITY MEASURE: CPT

## 2020-01-01 PROCEDURE — 76770 US EXAM ABDO BACK WALL COMP: CPT

## 2020-01-01 PROCEDURE — 77030040831 HC BAG URINE DRNG MDII -A

## 2020-01-01 PROCEDURE — 00731 ANES UPR GI NDSC PX NOS: CPT | Performed by: ANESTHESIOLOGY

## 2020-01-01 PROCEDURE — 99233 SBSQ HOSP IP/OBS HIGH 50: CPT | Performed by: STUDENT IN AN ORGANIZED HEALTH CARE EDUCATION/TRAINING PROGRAM

## 2020-01-01 PROCEDURE — 99221 1ST HOSP IP/OBS SF/LOW 40: CPT | Performed by: PSYCHIATRY & NEUROLOGY

## 2020-01-01 PROCEDURE — 82272 OCCULT BLD FECES 1-3 TESTS: CPT

## 2020-01-01 PROCEDURE — 84443 ASSAY THYROID STIM HORMONE: CPT

## 2020-01-01 PROCEDURE — 96376 TX/PRO/DX INJ SAME DRUG ADON: CPT

## 2020-01-01 PROCEDURE — 0W3P8ZZ CONTROL BLEEDING IN GASTROINTESTINAL TRACT, VIA NATURAL OR ARTIFICIAL OPENING ENDOSCOPIC: ICD-10-PCS | Performed by: INTERNAL MEDICINE

## 2020-01-01 PROCEDURE — 99223 1ST HOSP IP/OBS HIGH 75: CPT | Performed by: INTERNAL MEDICINE

## 2020-01-01 PROCEDURE — 74011250636 HC RX REV CODE- 250/636: Performed by: PHYSICIAN ASSISTANT

## 2020-01-01 PROCEDURE — A4452 WATERPROOF TAPE: HCPCS

## 2020-01-01 PROCEDURE — 70450 CT HEAD/BRAIN W/O DYE: CPT

## 2020-01-01 PROCEDURE — 83540 ASSAY OF IRON: CPT

## 2020-01-01 PROCEDURE — 97112 NEUROMUSCULAR REEDUCATION: CPT

## 2020-01-01 PROCEDURE — 3331090003 HH PPS REVENUE ADJ

## 2020-01-01 PROCEDURE — 70551 MRI BRAIN STEM W/O DYE: CPT

## 2020-01-01 PROCEDURE — 87086 URINE CULTURE/COLONY COUNT: CPT

## 2020-01-01 PROCEDURE — 76450000000

## 2020-01-01 PROCEDURE — 74011250636 HC RX REV CODE- 250/636

## 2020-01-01 PROCEDURE — 71045 X-RAY EXAM CHEST 1 VIEW: CPT

## 2020-01-01 RX ORDER — PANTOPRAZOLE SODIUM 40 MG/1
40 TABLET, DELAYED RELEASE ORAL 2 TIMES DAILY
Status: DISCONTINUED | OUTPATIENT
Start: 2020-01-01 | End: 2020-01-01 | Stop reason: HOSPADM

## 2020-01-01 RX ORDER — ENOXAPARIN SODIUM 100 MG/ML
1 INJECTION SUBCUTANEOUS EVERY 12 HOURS
Status: DISCONTINUED | OUTPATIENT
Start: 2020-01-01 | End: 2020-01-01

## 2020-01-01 RX ORDER — LORAZEPAM 2 MG/ML
INJECTION INTRAMUSCULAR
Status: COMPLETED
Start: 2020-01-01 | End: 2020-01-01

## 2020-01-01 RX ORDER — LORAZEPAM 2 MG/ML
1 INJECTION INTRAMUSCULAR
Status: COMPLETED | OUTPATIENT
Start: 2020-01-01 | End: 2020-01-01

## 2020-01-01 RX ORDER — DILTIAZEM HYDROCHLORIDE 5 MG/ML
10 INJECTION INTRAVENOUS
Status: DISCONTINUED | OUTPATIENT
Start: 2020-01-01 | End: 2020-01-01

## 2020-01-01 RX ORDER — LANOLIN ALCOHOL/MO/W.PET/CERES
100 CREAM (GRAM) TOPICAL DAILY
Qty: 30 TAB | Refills: 0 | Status: SHIPPED | OUTPATIENT
Start: 2020-01-01

## 2020-01-01 RX ORDER — SODIUM CHLORIDE 9 MG/ML
250 INJECTION, SOLUTION INTRAVENOUS ONCE
Status: DISCONTINUED | OUTPATIENT
Start: 2020-01-01 | End: 2020-01-01

## 2020-01-01 RX ORDER — LANOLIN ALCOHOL/MO/W.PET/CERES
100 CREAM (GRAM) TOPICAL DAILY
Status: DISCONTINUED | OUTPATIENT
Start: 2020-01-01 | End: 2020-01-01

## 2020-01-01 RX ORDER — THERA TABS 400 MCG
1 TAB ORAL DAILY
Qty: 30 TAB | Refills: 0 | Status: SHIPPED | OUTPATIENT
Start: 2020-01-01

## 2020-01-01 RX ORDER — LORAZEPAM 2 MG/ML
3 INJECTION INTRAMUSCULAR
Status: DISCONTINUED | OUTPATIENT
Start: 2020-01-01 | End: 2020-01-01

## 2020-01-01 RX ORDER — DILTIAZEM HYDROCHLORIDE 5 MG/ML
INJECTION INTRAVENOUS
Status: DISPENSED
Start: 2020-01-01 | End: 2020-01-01

## 2020-01-01 RX ORDER — LANOLIN ALCOHOL/MO/W.PET/CERES
6 CREAM (GRAM) TOPICAL
Status: DISCONTINUED | OUTPATIENT
Start: 2020-01-01 | End: 2020-01-01 | Stop reason: HOSPADM

## 2020-01-01 RX ORDER — AMOXICILLIN 250 MG/1
1000 CAPSULE ORAL EVERY 12 HOURS
Status: DISPENSED | OUTPATIENT
Start: 2020-01-01 | End: 2020-01-01

## 2020-01-01 RX ORDER — SUCRALFATE 1 G/1
1 TABLET ORAL
Status: DISCONTINUED | OUTPATIENT
Start: 2020-01-01 | End: 2020-01-01 | Stop reason: HOSPADM

## 2020-01-01 RX ORDER — LORAZEPAM 2 MG/ML
0.5 INJECTION INTRAMUSCULAR ONCE
Status: COMPLETED | OUTPATIENT
Start: 2020-01-01 | End: 2020-01-01

## 2020-01-01 RX ORDER — EPINEPHRINE 0.1 MG/ML
INJECTION INTRACARDIAC; INTRAVENOUS AS NEEDED
Status: DISCONTINUED | OUTPATIENT
Start: 2020-01-01 | End: 2020-01-01 | Stop reason: HOSPADM

## 2020-01-01 RX ORDER — LORAZEPAM 2 MG/ML
2 INJECTION INTRAMUSCULAR
Status: DISCONTINUED | OUTPATIENT
Start: 2020-01-01 | End: 2020-01-01

## 2020-01-01 RX ORDER — LIDOCAINE HYDROCHLORIDE 20 MG/ML
INJECTION, SOLUTION EPIDURAL; INFILTRATION; INTRACAUDAL; PERINEURAL AS NEEDED
Status: DISCONTINUED | OUTPATIENT
Start: 2020-01-01 | End: 2020-01-01 | Stop reason: HOSPADM

## 2020-01-01 RX ORDER — HYDRALAZINE HYDROCHLORIDE 20 MG/ML
10 INJECTION INTRAMUSCULAR; INTRAVENOUS
Status: DISCONTINUED | OUTPATIENT
Start: 2020-01-01 | End: 2020-01-01 | Stop reason: HOSPADM

## 2020-01-01 RX ORDER — HALOPERIDOL 10 MG/1
5 TABLET ORAL 2 TIMES DAILY
Status: DISCONTINUED | OUTPATIENT
Start: 2020-01-01 | End: 2020-01-01 | Stop reason: HOSPADM

## 2020-01-01 RX ORDER — DEXTROSE MONOHYDRATE 50 MG/ML
500 INJECTION, SOLUTION INTRAVENOUS ONCE
Status: COMPLETED | OUTPATIENT
Start: 2020-01-01 | End: 2020-01-01

## 2020-01-01 RX ORDER — SODIUM CHLORIDE 9 MG/ML
50 INJECTION, SOLUTION INTRAVENOUS CONTINUOUS
Status: DISCONTINUED | OUTPATIENT
Start: 2020-01-01 | End: 2020-01-01

## 2020-01-01 RX ORDER — ZOLPIDEM TARTRATE 5 MG/1
5 TABLET ORAL ONCE
Status: COMPLETED | OUTPATIENT
Start: 2020-01-01 | End: 2020-01-01

## 2020-01-01 RX ORDER — FUROSEMIDE 10 MG/ML
20 INJECTION INTRAMUSCULAR; INTRAVENOUS DAILY
Status: DISCONTINUED | OUTPATIENT
Start: 2020-01-01 | End: 2020-01-01

## 2020-01-01 RX ORDER — ONDANSETRON 2 MG/ML
4 INJECTION INTRAMUSCULAR; INTRAVENOUS
Status: DISCONTINUED | OUTPATIENT
Start: 2020-01-01 | End: 2020-01-01 | Stop reason: HOSPADM

## 2020-01-01 RX ORDER — PANTOPRAZOLE SODIUM 40 MG/1
40 TABLET, DELAYED RELEASE ORAL 2 TIMES DAILY
Qty: 60 TAB | Refills: 0 | Status: SHIPPED | OUTPATIENT
Start: 2020-01-01

## 2020-01-01 RX ORDER — SUCRALFATE 1 G/1
1 TABLET ORAL
Qty: 40 TAB | Refills: 0 | Status: SHIPPED | OUTPATIENT
Start: 2020-01-01 | End: 2020-01-01

## 2020-01-01 RX ORDER — ACETAMINOPHEN 650 MG/1
650 SUPPOSITORY RECTAL
Status: DISCONTINUED | OUTPATIENT
Start: 2020-01-01 | End: 2020-01-01 | Stop reason: HOSPADM

## 2020-01-01 RX ORDER — LORAZEPAM 2 MG/ML
0.5 INJECTION INTRAMUSCULAR
Status: COMPLETED | OUTPATIENT
Start: 2020-01-01 | End: 2020-01-01

## 2020-01-01 RX ORDER — CIPROFLOXACIN 500 MG/1
500 TABLET ORAL EVERY 12 HOURS
Status: DISCONTINUED | OUTPATIENT
Start: 2020-01-01 | End: 2020-01-01

## 2020-01-01 RX ORDER — DILTIAZEM HYDROCHLORIDE 5 MG/ML
10 INJECTION INTRAVENOUS ONCE
Status: COMPLETED | OUTPATIENT
Start: 2020-01-01 | End: 2020-01-01

## 2020-01-01 RX ORDER — HALOPERIDOL 5 MG/ML
2 INJECTION INTRAMUSCULAR ONCE
Status: COMPLETED | OUTPATIENT
Start: 2020-01-01 | End: 2020-01-01

## 2020-01-01 RX ORDER — DEXTROMETHORPHAN/PSEUDOEPHED 2.5-7.5/.8
DROPS ORAL AS NEEDED
Status: DISCONTINUED | OUTPATIENT
Start: 2020-01-01 | End: 2020-01-01 | Stop reason: HOSPADM

## 2020-01-01 RX ORDER — LORAZEPAM 1 MG/1
1 TABLET ORAL
Status: DISCONTINUED | OUTPATIENT
Start: 2020-01-01 | End: 2020-01-01

## 2020-01-01 RX ORDER — LORAZEPAM 2 MG/ML
1 INJECTION INTRAMUSCULAR
Status: DISCONTINUED | OUTPATIENT
Start: 2020-01-01 | End: 2020-01-01

## 2020-01-01 RX ORDER — SODIUM CHLORIDE 0.9 % (FLUSH) 0.9 %
5-40 SYRINGE (ML) INJECTION AS NEEDED
Status: DISCONTINUED | OUTPATIENT
Start: 2020-01-01 | End: 2020-01-01 | Stop reason: SDUPTHER

## 2020-01-01 RX ORDER — SODIUM CHLORIDE 0.9 % (FLUSH) 0.9 %
5-40 SYRINGE (ML) INJECTION AS NEEDED
Status: CANCELLED | OUTPATIENT
Start: 2020-01-01

## 2020-01-01 RX ORDER — LISINOPRIL 5 MG/1
2.5 TABLET ORAL DAILY
Status: DISCONTINUED | OUTPATIENT
Start: 2020-01-01 | End: 2020-01-01

## 2020-01-01 RX ORDER — LANOLIN ALCOHOL/MO/W.PET/CERES
100 CREAM (GRAM) TOPICAL DAILY
Status: DISCONTINUED | OUTPATIENT
Start: 2020-01-01 | End: 2020-01-01 | Stop reason: HOSPADM

## 2020-01-01 RX ORDER — POLYETHYLENE GLYCOL 3350 17 G/17G
17 POWDER, FOR SOLUTION ORAL DAILY PRN
Status: DISCONTINUED | OUTPATIENT
Start: 2020-01-01 | End: 2020-01-01 | Stop reason: HOSPADM

## 2020-01-01 RX ORDER — FOLIC ACID 1 MG/1
1 TABLET ORAL DAILY
Qty: 30 TAB | Refills: 0 | Status: SHIPPED | OUTPATIENT
Start: 2020-01-01

## 2020-01-01 RX ORDER — HYDRALAZINE HYDROCHLORIDE 20 MG/ML
10 INJECTION INTRAMUSCULAR; INTRAVENOUS
Status: DISCONTINUED | OUTPATIENT
Start: 2020-01-01 | End: 2020-01-01 | Stop reason: SDUPTHER

## 2020-01-01 RX ORDER — ACETAMINOPHEN 325 MG/1
650 TABLET ORAL
Qty: 30 TAB | Refills: 0 | Status: SHIPPED | OUTPATIENT
Start: 2020-01-01

## 2020-01-01 RX ORDER — LANOLIN ALCOHOL/MO/W.PET/CERES
6 CREAM (GRAM) TOPICAL
Qty: 60 TAB | Refills: 0 | Status: SHIPPED | OUTPATIENT
Start: 2020-01-01

## 2020-01-01 RX ORDER — LORAZEPAM 2 MG/ML
1 INJECTION INTRAMUSCULAR ONCE
Status: COMPLETED | OUTPATIENT
Start: 2020-01-01 | End: 2020-01-01

## 2020-01-01 RX ORDER — FUROSEMIDE 20 MG/1
20 TABLET ORAL DAILY
Qty: 30 TAB | Refills: 0 | Status: SHIPPED | OUTPATIENT
Start: 2020-01-01

## 2020-01-01 RX ORDER — HALOPERIDOL 5 MG/1
5 TABLET ORAL 2 TIMES DAILY
Qty: 60 TAB | Refills: 0 | Status: SHIPPED | OUTPATIENT
Start: 2020-01-01

## 2020-01-01 RX ORDER — SODIUM CHLORIDE 0.9 % (FLUSH) 0.9 %
5-40 SYRINGE (ML) INJECTION EVERY 8 HOURS
Status: DISCONTINUED | OUTPATIENT
Start: 2020-01-01 | End: 2020-01-01

## 2020-01-01 RX ORDER — METOPROLOL TARTRATE 5 MG/5ML
5 INJECTION INTRAVENOUS
Status: DISCONTINUED | OUTPATIENT
Start: 2020-01-01 | End: 2020-01-01 | Stop reason: HOSPADM

## 2020-01-01 RX ORDER — CLARITHROMYCIN 500 MG/1
500 TABLET, FILM COATED ORAL 2 TIMES DAILY
Status: DISCONTINUED | OUTPATIENT
Start: 2020-01-01 | End: 2020-01-01

## 2020-01-01 RX ORDER — SODIUM CHLORIDE, SODIUM LACTATE, POTASSIUM CHLORIDE, CALCIUM CHLORIDE 600; 310; 30; 20 MG/100ML; MG/100ML; MG/100ML; MG/100ML
50 INJECTION, SOLUTION INTRAVENOUS CONTINUOUS
Status: CANCELLED | OUTPATIENT
Start: 2020-01-01

## 2020-01-01 RX ORDER — ACETAMINOPHEN 325 MG/1
650 TABLET ORAL
Status: DISCONTINUED | OUTPATIENT
Start: 2020-01-01 | End: 2020-01-01 | Stop reason: HOSPADM

## 2020-01-01 RX ORDER — TAMSULOSIN HYDROCHLORIDE 0.4 MG/1
0.4 CAPSULE ORAL
Status: DISCONTINUED | OUTPATIENT
Start: 2020-01-01 | End: 2020-01-01

## 2020-01-01 RX ORDER — METOPROLOL TARTRATE 50 MG/1
50 TABLET ORAL EVERY 12 HOURS
Qty: 60 TAB | Refills: 0 | Status: SHIPPED | OUTPATIENT
Start: 2020-01-01

## 2020-01-01 RX ORDER — HALOPERIDOL 5 MG/ML
5 INJECTION INTRAMUSCULAR ONCE
Status: COMPLETED | OUTPATIENT
Start: 2020-01-01 | End: 2020-01-01

## 2020-01-01 RX ORDER — PROMETHAZINE HYDROCHLORIDE 25 MG/1
12.5 TABLET ORAL
Status: DISCONTINUED | OUTPATIENT
Start: 2020-01-01 | End: 2020-01-01 | Stop reason: HOSPADM

## 2020-01-01 RX ORDER — METOPROLOL TARTRATE 50 MG/1
50 TABLET ORAL EVERY 12 HOURS
Status: DISCONTINUED | OUTPATIENT
Start: 2020-01-01 | End: 2020-01-01 | Stop reason: HOSPADM

## 2020-01-01 RX ORDER — LORAZEPAM 1 MG/1
2 TABLET ORAL
Status: DISCONTINUED | OUTPATIENT
Start: 2020-01-01 | End: 2020-01-01

## 2020-01-01 RX ORDER — SODIUM CHLORIDE 0.9 % (FLUSH) 0.9 %
5-40 SYRINGE (ML) INJECTION EVERY 8 HOURS
Status: CANCELLED | OUTPATIENT
Start: 2020-01-01

## 2020-01-01 RX ORDER — SODIUM CHLORIDE 0.9 % (FLUSH) 0.9 %
5-40 SYRINGE (ML) INJECTION EVERY 8 HOURS
Status: DISCONTINUED | OUTPATIENT
Start: 2020-01-01 | End: 2020-01-01 | Stop reason: SDUPTHER

## 2020-01-01 RX ORDER — POTASSIUM CHLORIDE 7.45 MG/ML
10 INJECTION INTRAVENOUS
Status: DISCONTINUED | OUTPATIENT
Start: 2020-01-01 | End: 2020-01-01

## 2020-01-01 RX ORDER — FUROSEMIDE 20 MG/1
20 TABLET ORAL DAILY
Status: DISCONTINUED | OUTPATIENT
Start: 2020-01-01 | End: 2020-01-01 | Stop reason: HOSPADM

## 2020-01-01 RX ORDER — GUAIFENESIN 100 MG/5ML
81 LIQUID (ML) ORAL DAILY
Status: DISCONTINUED | OUTPATIENT
Start: 2020-01-01 | End: 2020-01-01 | Stop reason: HOSPADM

## 2020-01-01 RX ORDER — SODIUM CHLORIDE 0.9 % (FLUSH) 0.9 %
5-40 SYRINGE (ML) INJECTION AS NEEDED
Status: DISCONTINUED | OUTPATIENT
Start: 2020-01-01 | End: 2020-01-01 | Stop reason: HOSPADM

## 2020-01-01 RX ORDER — THERA TABS 400 MCG
1 TAB ORAL DAILY
Status: DISCONTINUED | OUTPATIENT
Start: 2020-01-01 | End: 2020-01-01 | Stop reason: HOSPADM

## 2020-01-01 RX ORDER — PROPOFOL 10 MG/ML
INJECTION, EMULSION INTRAVENOUS AS NEEDED
Status: DISCONTINUED | OUTPATIENT
Start: 2020-01-01 | End: 2020-01-01 | Stop reason: HOSPADM

## 2020-01-01 RX ORDER — POLYETHYLENE GLYCOL 3350 17 G/17G
17 POWDER, FOR SOLUTION ORAL
Qty: 15 PACKET | Refills: 0 | Status: SHIPPED | OUTPATIENT
Start: 2020-01-01

## 2020-01-01 RX ORDER — LORAZEPAM 1 MG/1
1 TABLET ORAL ONCE
Status: COMPLETED | OUTPATIENT
Start: 2020-01-01 | End: 2020-01-01

## 2020-01-01 RX ORDER — CLARITHROMYCIN 250 MG/5ML
500 FOR SUSPENSION ORAL EVERY 12 HOURS
Status: DISPENSED | OUTPATIENT
Start: 2020-01-01 | End: 2020-01-01

## 2020-01-01 RX ORDER — FOLIC ACID 1 MG/1
1 TABLET ORAL DAILY
Status: DISCONTINUED | OUTPATIENT
Start: 2020-01-01 | End: 2020-01-01 | Stop reason: HOSPADM

## 2020-01-01 RX ORDER — METOPROLOL TARTRATE 25 MG/1
25 TABLET, FILM COATED ORAL EVERY 12 HOURS
Status: DISCONTINUED | OUTPATIENT
Start: 2020-01-01 | End: 2020-01-01

## 2020-01-01 RX ADMIN — PANTOPRAZOLE 40 MG: 40 TABLET, DELAYED RELEASE ORAL at 08:00

## 2020-01-01 RX ADMIN — Medication 10 ML: at 22:00

## 2020-01-01 RX ADMIN — SUCRALFATE 1 G: 1 TABLET ORAL at 10:35

## 2020-01-01 RX ADMIN — METOPROLOL TARTRATE 50 MG: 50 TABLET, FILM COATED ORAL at 09:45

## 2020-01-01 RX ADMIN — FUROSEMIDE 20 MG: 10 INJECTION, SOLUTION INTRAMUSCULAR; INTRAVENOUS at 08:44

## 2020-01-01 RX ADMIN — SUCRALFATE 1 G: 1 TABLET ORAL at 12:21

## 2020-01-01 RX ADMIN — AMOXICILLIN 1000 MG: 250 CAPSULE ORAL at 08:40

## 2020-01-01 RX ADMIN — AMOXICILLIN 1000 MG: 250 CAPSULE ORAL at 21:19

## 2020-01-01 RX ADMIN — PANTOPRAZOLE 40 MG: 40 TABLET, DELAYED RELEASE ORAL at 10:37

## 2020-01-01 RX ADMIN — THERA TABS 1 TABLET: TAB at 09:07

## 2020-01-01 RX ADMIN — METOPROLOL TARTRATE 50 MG: 50 TABLET, FILM COATED ORAL at 09:07

## 2020-01-01 RX ADMIN — HALOPERIDOL LACTATE 2 MG: 5 INJECTION, SOLUTION INTRAMUSCULAR at 00:46

## 2020-01-01 RX ADMIN — Medication 6 MG: at 23:37

## 2020-01-01 RX ADMIN — CLARITHROMYCIN 500 MG: 250 FOR SUSPENSION ORAL at 09:00

## 2020-01-01 RX ADMIN — FOLIC ACID 1 MG: 1 TABLET ORAL at 09:36

## 2020-01-01 RX ADMIN — Medication 100 MG: at 08:44

## 2020-01-01 RX ADMIN — SUCRALFATE 1 G: 1 TABLET ORAL at 10:56

## 2020-01-01 RX ADMIN — Medication 10 ML: at 06:00

## 2020-01-01 RX ADMIN — SODIUM CHLORIDE 8 MG/HR: 900 INJECTION INTRAVENOUS at 18:32

## 2020-01-01 RX ADMIN — LORAZEPAM 1 MG: 2 INJECTION, SOLUTION INTRAMUSCULAR; INTRAVENOUS at 03:09

## 2020-01-01 RX ADMIN — SODIUM CHLORIDE 40 MG: 9 INJECTION INTRAMUSCULAR; INTRAVENOUS; SUBCUTANEOUS at 15:59

## 2020-01-01 RX ADMIN — Medication 10 ML: at 18:07

## 2020-01-01 RX ADMIN — SUCRALFATE 1 G: 1 TABLET ORAL at 09:44

## 2020-01-01 RX ADMIN — THERA TABS 1 TABLET: TAB at 10:30

## 2020-01-01 RX ADMIN — Medication 6 MG: at 21:36

## 2020-01-01 RX ADMIN — Medication 100 MG: at 08:26

## 2020-01-01 RX ADMIN — HALOPERIDOL 5 MG: 10 TABLET ORAL at 16:13

## 2020-01-01 RX ADMIN — FOLIC ACID 1 MG: 1 TABLET ORAL at 09:37

## 2020-01-01 RX ADMIN — DILTIAZEM HYDROCHLORIDE 5 MG/HR: 5 INJECTION INTRAVENOUS at 10:25

## 2020-01-01 RX ADMIN — SUCRALFATE 1 G: 1 TABLET ORAL at 17:13

## 2020-01-01 RX ADMIN — METOPROLOL TARTRATE 50 MG: 50 TABLET, FILM COATED ORAL at 09:49

## 2020-01-01 RX ADMIN — FUROSEMIDE 20 MG: 20 TABLET ORAL at 09:44

## 2020-01-01 RX ADMIN — ENOXAPARIN SODIUM 70 MG: 100 INJECTION SUBCUTANEOUS at 05:06

## 2020-01-01 RX ADMIN — FUROSEMIDE 20 MG: 20 TABLET ORAL at 09:35

## 2020-01-01 RX ADMIN — PROPOFOL 10 MG: 10 INJECTION, EMULSION INTRAVENOUS at 11:06

## 2020-01-01 RX ADMIN — SUCRALFATE 1 G: 1 TABLET ORAL at 22:57

## 2020-01-01 RX ADMIN — Medication 10 ML: at 06:14

## 2020-01-01 RX ADMIN — ENOXAPARIN SODIUM 70 MG: 100 INJECTION SUBCUTANEOUS at 17:04

## 2020-01-01 RX ADMIN — THERA TABS 1 TABLET: TAB at 09:59

## 2020-01-01 RX ADMIN — ENOXAPARIN SODIUM 80 MG: 80 INJECTION SUBCUTANEOUS at 18:37

## 2020-01-01 RX ADMIN — HALOPERIDOL 5 MG: 10 TABLET ORAL at 18:08

## 2020-01-01 RX ADMIN — Medication 6 MG: at 22:48

## 2020-01-01 RX ADMIN — Medication 10 ML: at 15:58

## 2020-01-01 RX ADMIN — THERA TABS 1 TABLET: TAB at 09:41

## 2020-01-01 RX ADMIN — LORAZEPAM 2 MG: 2 INJECTION INTRAMUSCULAR; INTRAVENOUS at 14:43

## 2020-01-01 RX ADMIN — Medication 100 MG: at 08:09

## 2020-01-01 RX ADMIN — FOLIC ACID 1 MG: 1 TABLET ORAL at 10:01

## 2020-01-01 RX ADMIN — METOPROLOL TARTRATE 50 MG: 50 TABLET, FILM COATED ORAL at 20:59

## 2020-01-01 RX ADMIN — FUROSEMIDE 20 MG: 20 TABLET ORAL at 10:30

## 2020-01-01 RX ADMIN — AMOXICILLIN 1000 MG: 250 CAPSULE ORAL at 09:55

## 2020-01-01 RX ADMIN — PANTOPRAZOLE 40 MG: 40 TABLET, DELAYED RELEASE ORAL at 20:00

## 2020-01-01 RX ADMIN — Medication 100 MG: at 10:00

## 2020-01-01 RX ADMIN — AMOXICILLIN 1000 MG: 250 CAPSULE ORAL at 22:48

## 2020-01-01 RX ADMIN — INFLUENZA VIRUS VACCINE 0.5 ML: 15; 15; 15; 15 SUSPENSION INTRAMUSCULAR at 08:36

## 2020-01-01 RX ADMIN — FOLIC ACID 1 MG: 1 TABLET ORAL at 09:22

## 2020-01-01 RX ADMIN — THIAMINE HYDROCHLORIDE 500 MG: 100 INJECTION, SOLUTION INTRAMUSCULAR; INTRAVENOUS at 17:47

## 2020-01-01 RX ADMIN — PANTOPRAZOLE 40 MG: 40 TABLET, DELAYED RELEASE ORAL at 17:34

## 2020-01-01 RX ADMIN — THERA TABS 1 TABLET: TAB at 09:48

## 2020-01-01 RX ADMIN — LORAZEPAM 0.5 MG: 2 INJECTION INTRAMUSCULAR; INTRAVENOUS at 00:07

## 2020-01-01 RX ADMIN — SUCRALFATE 1 G: 1 TABLET ORAL at 18:08

## 2020-01-01 RX ADMIN — PANTOPRAZOLE 40 MG: 40 TABLET, DELAYED RELEASE ORAL at 17:16

## 2020-01-01 RX ADMIN — CIPROFLOXACIN 500 MG: 500 TABLET, FILM COATED ORAL at 23:18

## 2020-01-01 RX ADMIN — SUCRALFATE 1 G: 1 TABLET ORAL at 06:32

## 2020-01-01 RX ADMIN — CLARITHROMYCIN 500 MG: 250 FOR SUSPENSION ORAL at 21:36

## 2020-01-01 RX ADMIN — METOPROLOL TARTRATE 50 MG: 50 TABLET, FILM COATED ORAL at 08:25

## 2020-01-01 RX ADMIN — HALOPERIDOL 5 MG: 10 TABLET ORAL at 09:35

## 2020-01-01 RX ADMIN — HALOPERIDOL 5 MG: 10 TABLET ORAL at 09:59

## 2020-01-01 RX ADMIN — Medication 6 MG: at 21:26

## 2020-01-01 RX ADMIN — PANTOPRAZOLE 40 MG: 40 TABLET, DELAYED RELEASE ORAL at 17:26

## 2020-01-01 RX ADMIN — SUCRALFATE 1 G: 1 TABLET ORAL at 09:22

## 2020-01-01 RX ADMIN — ZOLPIDEM TARTRATE 5 MG: 5 TABLET ORAL at 02:02

## 2020-01-01 RX ADMIN — THIAMINE HYDROCHLORIDE 500 MG: 100 INJECTION, SOLUTION INTRAMUSCULAR; INTRAVENOUS at 18:28

## 2020-01-01 RX ADMIN — METOPROLOL TARTRATE 25 MG: 25 TABLET, FILM COATED ORAL at 15:36

## 2020-01-01 RX ADMIN — SUCRALFATE 1 G: 1 TABLET ORAL at 21:36

## 2020-01-01 RX ADMIN — Medication 10 ML: at 06:13

## 2020-01-01 RX ADMIN — PANTOPRAZOLE 40 MG: 40 TABLET, DELAYED RELEASE ORAL at 17:24

## 2020-01-01 RX ADMIN — SUCRALFATE 1 G: 1 TABLET ORAL at 22:02

## 2020-01-01 RX ADMIN — HALOPERIDOL 5 MG: 10 TABLET ORAL at 09:48

## 2020-01-01 RX ADMIN — ENOXAPARIN SODIUM 80 MG: 80 INJECTION SUBCUTANEOUS at 07:17

## 2020-01-01 RX ADMIN — THERA TABS 1 TABLET: TAB at 08:44

## 2020-01-01 RX ADMIN — AMOXICILLIN 1000 MG: 250 CAPSULE ORAL at 21:36

## 2020-01-01 RX ADMIN — Medication 10 ML: at 06:01

## 2020-01-01 RX ADMIN — SUCRALFATE 1 G: 1 TABLET ORAL at 16:13

## 2020-01-01 RX ADMIN — METOPROLOL TARTRATE 50 MG: 50 TABLET, FILM COATED ORAL at 08:09

## 2020-01-01 RX ADMIN — METOPROLOL TARTRATE 50 MG: 50 TABLET, FILM COATED ORAL at 01:49

## 2020-01-01 RX ADMIN — CLARITHROMYCIN 500 MG: 250 FOR SUSPENSION ORAL at 08:42

## 2020-01-01 RX ADMIN — METOPROLOL TARTRATE 50 MG: 50 TABLET, FILM COATED ORAL at 21:36

## 2020-01-01 RX ADMIN — THERA TABS 1 TABLET: TAB at 08:01

## 2020-01-01 RX ADMIN — HALOPERIDOL 5 MG: 10 TABLET ORAL at 10:30

## 2020-01-01 RX ADMIN — LORAZEPAM 1 MG: 2 INJECTION INTRAMUSCULAR; INTRAVENOUS at 03:07

## 2020-01-01 RX ADMIN — SUCRALFATE 1 G: 1 TABLET ORAL at 20:00

## 2020-01-01 RX ADMIN — SUCRALFATE 1 G: 1 TABLET ORAL at 06:40

## 2020-01-01 RX ADMIN — LORAZEPAM 1 MG: 2 INJECTION, SOLUTION INTRAMUSCULAR; INTRAVENOUS at 14:02

## 2020-01-01 RX ADMIN — SUCRALFATE 1 G: 1 TABLET ORAL at 10:31

## 2020-01-01 RX ADMIN — SUCRALFATE 1 G: 1 TABLET ORAL at 21:39

## 2020-01-01 RX ADMIN — CLARITHROMYCIN 500 MG: 250 FOR SUSPENSION ORAL at 21:37

## 2020-01-01 RX ADMIN — Medication 10 ML: at 13:09

## 2020-01-01 RX ADMIN — SUCRALFATE 1 G: 1 TABLET ORAL at 16:34

## 2020-01-01 RX ADMIN — METOPROLOL TARTRATE 50 MG: 50 TABLET, FILM COATED ORAL at 21:58

## 2020-01-01 RX ADMIN — THERA TABS 1 TABLET: TAB at 08:38

## 2020-01-01 RX ADMIN — PROPOFOL 10 MG: 10 INJECTION, EMULSION INTRAVENOUS at 11:10

## 2020-01-01 RX ADMIN — FOLIC ACID 1 MG: 1 TABLET ORAL at 10:28

## 2020-01-01 RX ADMIN — THIAMINE HYDROCHLORIDE 500 MG: 100 INJECTION, SOLUTION INTRAMUSCULAR; INTRAVENOUS at 23:42

## 2020-01-01 RX ADMIN — FOLIC ACID 1 MG: 1 TABLET ORAL at 09:59

## 2020-01-01 RX ADMIN — Medication 10 ML: at 14:00

## 2020-01-01 RX ADMIN — Medication 10 ML: at 23:10

## 2020-01-01 RX ADMIN — AMOXICILLIN 500 MG: 250 CAPSULE ORAL at 23:18

## 2020-01-01 RX ADMIN — Medication 10 ML: at 22:02

## 2020-01-01 RX ADMIN — CLARITHROMYCIN 500 MG: 250 FOR SUSPENSION ORAL at 21:26

## 2020-01-01 RX ADMIN — FOLIC ACID 1 MG: 1 TABLET ORAL at 08:44

## 2020-01-01 RX ADMIN — HALOPERIDOL 5 MG: 10 TABLET ORAL at 18:00

## 2020-01-01 RX ADMIN — THERA TABS 1 TABLET: TAB at 10:33

## 2020-01-01 RX ADMIN — SUCRALFATE 1 G: 1 TABLET ORAL at 11:26

## 2020-01-01 RX ADMIN — THERA TABS 1 TABLET: TAB at 09:35

## 2020-01-01 RX ADMIN — SODIUM CHLORIDE 8 MG/HR: 900 INJECTION INTRAVENOUS at 23:57

## 2020-01-01 RX ADMIN — Medication 10 ML: at 22:03

## 2020-01-01 RX ADMIN — AMOXICILLIN 1000 MG: 250 CAPSULE ORAL at 08:33

## 2020-01-01 RX ADMIN — Medication 10 ML: at 21:30

## 2020-01-01 RX ADMIN — Medication 6 MG: at 21:39

## 2020-01-01 RX ADMIN — HALOPERIDOL 5 MG: 10 TABLET ORAL at 20:02

## 2020-01-01 RX ADMIN — Medication 100 MG: at 09:07

## 2020-01-01 RX ADMIN — CLARITHROMYCIN 500 MG: 250 FOR SUSPENSION ORAL at 10:30

## 2020-01-01 RX ADMIN — IRON SUCROSE 300 MG: 20 INJECTION, SOLUTION INTRAVENOUS at 10:34

## 2020-01-01 RX ADMIN — SUCRALFATE 1 G: 1 TABLET ORAL at 21:52

## 2020-01-01 RX ADMIN — AMOXICILLIN 1000 MG: 250 CAPSULE ORAL at 09:36

## 2020-01-01 RX ADMIN — PROPOFOL 10 MG: 10 INJECTION, EMULSION INTRAVENOUS at 10:55

## 2020-01-01 RX ADMIN — CLARITHROMYCIN 500 MG: 250 FOR SUSPENSION ORAL at 13:53

## 2020-01-01 RX ADMIN — SUCRALFATE 1 G: 1 TABLET ORAL at 18:28

## 2020-01-01 RX ADMIN — PANTOPRAZOLE 40 MG: 40 TABLET, DELAYED RELEASE ORAL at 08:34

## 2020-01-01 RX ADMIN — SUCRALFATE 1 G: 1 TABLET ORAL at 08:08

## 2020-01-01 RX ADMIN — PROPOFOL 10 MG: 10 INJECTION, EMULSION INTRAVENOUS at 10:52

## 2020-01-01 RX ADMIN — PROPOFOL 10 MG: 10 INJECTION, EMULSION INTRAVENOUS at 11:00

## 2020-01-01 RX ADMIN — HALOPERIDOL LACTATE 5 MG: 5 INJECTION, SOLUTION INTRAMUSCULAR at 11:48

## 2020-01-01 RX ADMIN — SUCRALFATE 1 G: 1 TABLET ORAL at 08:38

## 2020-01-01 RX ADMIN — AMOXICILLIN 1000 MG: 250 CAPSULE ORAL at 21:52

## 2020-01-01 RX ADMIN — CLARITHROMYCIN 500 MG: 250 FOR SUSPENSION ORAL at 23:41

## 2020-01-01 RX ADMIN — PANTOPRAZOLE 40 MG: 40 TABLET, DELAYED RELEASE ORAL at 17:43

## 2020-01-01 RX ADMIN — PANTOPRAZOLE 40 MG: 40 TABLET, DELAYED RELEASE ORAL at 08:26

## 2020-01-01 RX ADMIN — PANTOPRAZOLE 40 MG: 40 TABLET, DELAYED RELEASE ORAL at 17:01

## 2020-01-01 RX ADMIN — SODIUM CHLORIDE 8 MG/HR: 900 INJECTION INTRAVENOUS at 09:05

## 2020-01-01 RX ADMIN — Medication 10 ML: at 13:51

## 2020-01-01 RX ADMIN — Medication 100 MG: at 09:48

## 2020-01-01 RX ADMIN — METOPROLOL TARTRATE 25 MG: 25 TABLET, FILM COATED ORAL at 08:44

## 2020-01-01 RX ADMIN — METOPROLOL TARTRATE 50 MG: 50 TABLET, FILM COATED ORAL at 21:26

## 2020-01-01 RX ADMIN — Medication 100 MG: at 09:59

## 2020-01-01 RX ADMIN — PANTOPRAZOLE 40 MG: 40 TABLET, DELAYED RELEASE ORAL at 16:13

## 2020-01-01 RX ADMIN — METOPROLOL TARTRATE 50 MG: 50 TABLET, FILM COATED ORAL at 08:00

## 2020-01-01 RX ADMIN — Medication 100 MG: at 08:08

## 2020-01-01 RX ADMIN — THERA TABS 1 TABLET: TAB at 08:33

## 2020-01-01 RX ADMIN — Medication 100 MG: at 09:38

## 2020-01-01 RX ADMIN — METOPROLOL TARTRATE 50 MG: 50 TABLET, FILM COATED ORAL at 08:33

## 2020-01-01 RX ADMIN — ACETAMINOPHEN 650 MG: 325 TABLET ORAL at 21:32

## 2020-01-01 RX ADMIN — SUCRALFATE 1 G: 1 TABLET ORAL at 21:07

## 2020-01-01 RX ADMIN — AMOXICILLIN 1000 MG: 250 CAPSULE ORAL at 21:56

## 2020-01-01 RX ADMIN — CLARITHROMYCIN 500 MG: 250 FOR SUSPENSION ORAL at 09:08

## 2020-01-01 RX ADMIN — Medication 100 MG: at 09:44

## 2020-01-01 RX ADMIN — THERA TABS 1 TABLET: TAB at 08:26

## 2020-01-01 RX ADMIN — PANTOPRAZOLE 40 MG: 40 TABLET, DELAYED RELEASE ORAL at 10:00

## 2020-01-01 RX ADMIN — DILTIAZEM HYDROCHLORIDE 10 MG: 5 INJECTION INTRAVENOUS at 15:14

## 2020-01-01 RX ADMIN — FOLIC ACID 1 MG: 1 TABLET ORAL at 09:56

## 2020-01-01 RX ADMIN — SUCRALFATE 1 G: 1 TABLET ORAL at 16:12

## 2020-01-01 RX ADMIN — HALOPERIDOL 5 MG: 10 TABLET ORAL at 19:10

## 2020-01-01 RX ADMIN — THIAMINE HYDROCHLORIDE 500 MG: 100 INJECTION, SOLUTION INTRAMUSCULAR; INTRAVENOUS at 13:45

## 2020-01-01 RX ADMIN — SUCRALFATE 1 G: 1 TABLET ORAL at 21:32

## 2020-01-01 RX ADMIN — SUCRALFATE 1 G: 1 TABLET ORAL at 11:02

## 2020-01-01 RX ADMIN — SUCRALFATE 1 G: 1 TABLET ORAL at 17:26

## 2020-01-01 RX ADMIN — Medication 10 ML: at 07:06

## 2020-01-01 RX ADMIN — Medication 10 ML: at 21:51

## 2020-01-01 RX ADMIN — ASPIRIN 81 MG CHEWABLE TABLET 81 MG: 81 TABLET CHEWABLE at 10:28

## 2020-01-01 RX ADMIN — METOPROLOL TARTRATE 50 MG: 50 TABLET, FILM COATED ORAL at 09:37

## 2020-01-01 RX ADMIN — CLARITHROMYCIN 500 MG: 250 FOR SUSPENSION ORAL at 08:09

## 2020-01-01 RX ADMIN — THERA TABS 1 TABLET: TAB at 09:22

## 2020-01-01 RX ADMIN — Medication 100 MG: at 08:00

## 2020-01-01 RX ADMIN — Medication 10 ML: at 21:16

## 2020-01-01 RX ADMIN — SUCRALFATE 1 G: 1 TABLET ORAL at 18:31

## 2020-01-01 RX ADMIN — CLARITHROMYCIN 500 MG: 250 FOR SUSPENSION ORAL at 09:45

## 2020-01-01 RX ADMIN — SUCRALFATE 1 G: 1 TABLET ORAL at 21:13

## 2020-01-01 RX ADMIN — SODIUM CHLORIDE 40 MG: 9 INJECTION INTRAMUSCULAR; INTRAVENOUS; SUBCUTANEOUS at 04:00

## 2020-01-01 RX ADMIN — PANTOPRAZOLE 40 MG: 40 TABLET, DELAYED RELEASE ORAL at 09:48

## 2020-01-01 RX ADMIN — CLARITHROMYCIN 500 MG: 250 FOR SUSPENSION ORAL at 22:06

## 2020-01-01 RX ADMIN — AMOXICILLIN 1000 MG: 250 CAPSULE ORAL at 10:37

## 2020-01-01 RX ADMIN — SUCRALFATE 1 G: 1 TABLET ORAL at 21:58

## 2020-01-01 RX ADMIN — FOLIC ACID 1 MG: 1 TABLET ORAL at 08:34

## 2020-01-01 RX ADMIN — SUCRALFATE 1 G: 1 TABLET ORAL at 16:30

## 2020-01-01 RX ADMIN — SUCRALFATE 1 G: 1 TABLET ORAL at 17:06

## 2020-01-01 RX ADMIN — HALOPERIDOL 5 MG: 10 TABLET ORAL at 10:00

## 2020-01-01 RX ADMIN — SUCRALFATE 1 G: 1 TABLET ORAL at 08:33

## 2020-01-01 RX ADMIN — PROPOFOL 10 MG: 10 INJECTION, EMULSION INTRAVENOUS at 11:02

## 2020-01-01 RX ADMIN — SUCRALFATE 1 G: 1 TABLET ORAL at 12:09

## 2020-01-01 RX ADMIN — SODIUM CHLORIDE 8 MG/HR: 900 INJECTION INTRAVENOUS at 21:29

## 2020-01-01 RX ADMIN — PANTOPRAZOLE 40 MG: 40 TABLET, DELAYED RELEASE ORAL at 09:44

## 2020-01-01 RX ADMIN — HALOPERIDOL 5 MG: 10 TABLET ORAL at 17:17

## 2020-01-01 RX ADMIN — HALOPERIDOL 5 MG: 10 TABLET ORAL at 18:31

## 2020-01-01 RX ADMIN — SUCRALFATE 1 G: 1 TABLET ORAL at 21:11

## 2020-01-01 RX ADMIN — METOPROLOL TARTRATE 50 MG: 50 TABLET, FILM COATED ORAL at 09:41

## 2020-01-01 RX ADMIN — PANTOPRAZOLE 40 MG: 40 TABLET, DELAYED RELEASE ORAL at 09:37

## 2020-01-01 RX ADMIN — SUCRALFATE 1 G: 1 TABLET ORAL at 12:10

## 2020-01-01 RX ADMIN — FOLIC ACID 1 MG: 1 TABLET ORAL at 09:44

## 2020-01-01 RX ADMIN — Medication 6 MG: at 21:29

## 2020-01-01 RX ADMIN — HALOPERIDOL 5 MG: 10 TABLET ORAL at 09:44

## 2020-01-01 RX ADMIN — CLARITHROMYCIN 500 MG: 250 FOR SUSPENSION ORAL at 21:23

## 2020-01-01 RX ADMIN — CLARITHROMYCIN 500 MG: 250 FOR SUSPENSION ORAL at 09:51

## 2020-01-01 RX ADMIN — METOPROLOL TARTRATE 50 MG: 50 TABLET, FILM COATED ORAL at 21:52

## 2020-01-01 RX ADMIN — FUROSEMIDE 20 MG: 20 TABLET ORAL at 10:33

## 2020-01-01 RX ADMIN — SUCRALFATE 1 G: 1 TABLET ORAL at 18:00

## 2020-01-01 RX ADMIN — AMOXICILLIN 1000 MG: 250 CAPSULE ORAL at 08:08

## 2020-01-01 RX ADMIN — FOLIC ACID 1 MG: 1 TABLET ORAL at 09:48

## 2020-01-01 RX ADMIN — FUROSEMIDE 20 MG: 20 TABLET ORAL at 09:37

## 2020-01-01 RX ADMIN — LORAZEPAM 2 MG: 2 INJECTION INTRAMUSCULAR; INTRAVENOUS at 15:48

## 2020-01-01 RX ADMIN — SUCRALFATE 1 G: 1 TABLET ORAL at 12:56

## 2020-01-01 RX ADMIN — Medication 100 MG: at 10:37

## 2020-01-01 RX ADMIN — ASPIRIN 81 MG CHEWABLE TABLET 81 MG: 81 TABLET CHEWABLE at 08:44

## 2020-01-01 RX ADMIN — SUCRALFATE 1 G: 1 TABLET ORAL at 16:23

## 2020-01-01 RX ADMIN — AMOXICILLIN 1000 MG: 250 CAPSULE ORAL at 08:25

## 2020-01-01 RX ADMIN — FOLIC ACID 1 MG: 1 TABLET ORAL at 11:02

## 2020-01-01 RX ADMIN — FUROSEMIDE 20 MG: 20 TABLET ORAL at 08:00

## 2020-01-01 RX ADMIN — AMOXICILLIN 1000 MG: 250 CAPSULE ORAL at 09:48

## 2020-01-01 RX ADMIN — AMOXICILLIN 1000 MG: 250 CAPSULE ORAL at 20:59

## 2020-01-01 RX ADMIN — PANTOPRAZOLE 40 MG: 40 TABLET, DELAYED RELEASE ORAL at 18:08

## 2020-01-01 RX ADMIN — METOPROLOL TARTRATE 50 MG: 50 TABLET, FILM COATED ORAL at 22:53

## 2020-01-01 RX ADMIN — THERA TABS 1 TABLET: TAB at 08:09

## 2020-01-01 RX ADMIN — LORAZEPAM 0.5 MG: 2 INJECTION INTRAMUSCULAR; INTRAVENOUS at 09:03

## 2020-01-01 RX ADMIN — METOPROLOL TARTRATE 25 MG: 25 TABLET, FILM COATED ORAL at 20:28

## 2020-01-01 RX ADMIN — PANTOPRAZOLE 40 MG: 40 TABLET, DELAYED RELEASE ORAL at 09:41

## 2020-01-01 RX ADMIN — FUROSEMIDE 20 MG: 20 TABLET ORAL at 09:49

## 2020-01-01 RX ADMIN — Medication 100 MG: at 10:30

## 2020-01-01 RX ADMIN — HALOPERIDOL 5 MG: 10 TABLET ORAL at 09:07

## 2020-01-01 RX ADMIN — METOPROLOL TARTRATE 50 MG: 50 TABLET, FILM COATED ORAL at 21:07

## 2020-01-01 RX ADMIN — SUCRALFATE 1 G: 1 TABLET ORAL at 13:51

## 2020-01-01 RX ADMIN — POTASSIUM CHLORIDE 10 MEQ: 7.46 INJECTION, SOLUTION INTRAVENOUS at 01:29

## 2020-01-01 RX ADMIN — CLARITHROMYCIN 500 MG: 250 FOR SUSPENSION ORAL at 16:09

## 2020-01-01 RX ADMIN — Medication 100 MG: at 09:22

## 2020-01-01 RX ADMIN — AMOXICILLIN 1000 MG: 250 CAPSULE ORAL at 09:38

## 2020-01-01 RX ADMIN — FUROSEMIDE 20 MG: 20 TABLET ORAL at 10:28

## 2020-01-01 RX ADMIN — PANTOPRAZOLE 40 MG: 40 TABLET, DELAYED RELEASE ORAL at 18:00

## 2020-01-01 RX ADMIN — CLARITHROMYCIN 500 MG: 250 FOR SUSPENSION ORAL at 13:06

## 2020-01-01 RX ADMIN — FOLIC ACID 1 MG: 1 TABLET ORAL at 09:41

## 2020-01-01 RX ADMIN — CIPROFLOXACIN 500 MG: 500 TABLET, FILM COATED ORAL at 22:53

## 2020-01-01 RX ADMIN — SUCRALFATE 1 G: 1 TABLET ORAL at 21:18

## 2020-01-01 RX ADMIN — Medication 100 MG: at 09:56

## 2020-01-01 RX ADMIN — Medication 10 ML: at 10:29

## 2020-01-01 RX ADMIN — SUCRALFATE 1 G: 1 TABLET ORAL at 21:26

## 2020-01-01 RX ADMIN — HALOPERIDOL LACTATE 5 MG: 5 INJECTION, SOLUTION INTRAMUSCULAR at 11:10

## 2020-01-01 RX ADMIN — FOLIC ACID 1 MG: 1 TABLET ORAL at 10:33

## 2020-01-01 RX ADMIN — SUCRALFATE 1 G: 1 TABLET ORAL at 13:12

## 2020-01-01 RX ADMIN — HALOPERIDOL LACTATE 5 MG: 5 INJECTION, SOLUTION INTRAMUSCULAR at 14:31

## 2020-01-01 RX ADMIN — HALOPERIDOL 5 MG: 10 TABLET ORAL at 09:56

## 2020-01-01 RX ADMIN — SUCRALFATE 1 G: 1 TABLET ORAL at 21:29

## 2020-01-01 RX ADMIN — CLARITHROMYCIN 500 MG: 250 FOR SUSPENSION ORAL at 21:43

## 2020-01-01 RX ADMIN — METOPROLOL TARTRATE 50 MG: 50 TABLET, FILM COATED ORAL at 09:56

## 2020-01-01 RX ADMIN — PANTOPRAZOLE 40 MG: 40 TABLET, DELAYED RELEASE ORAL at 09:56

## 2020-01-01 RX ADMIN — PROPOFOL 10 MG: 10 INJECTION, EMULSION INTRAVENOUS at 10:57

## 2020-01-01 RX ADMIN — Medication 6 MG: at 21:18

## 2020-01-01 RX ADMIN — HALOPERIDOL 5 MG: 10 TABLET ORAL at 17:34

## 2020-01-01 RX ADMIN — METOPROLOL TARTRATE 50 MG: 50 TABLET, FILM COATED ORAL at 09:59

## 2020-01-01 RX ADMIN — SUCRALFATE 1 G: 1 TABLET ORAL at 10:47

## 2020-01-01 RX ADMIN — AMOXICILLIN 1000 MG: 250 CAPSULE ORAL at 21:40

## 2020-01-01 RX ADMIN — FUROSEMIDE 20 MG: 20 TABLET ORAL at 09:59

## 2020-01-01 RX ADMIN — SUCRALFATE 1 G: 1 TABLET ORAL at 13:06

## 2020-01-01 RX ADMIN — SUCRALFATE 1 G: 1 TABLET ORAL at 17:25

## 2020-01-01 RX ADMIN — FOLIC ACID 1 MG: 1 TABLET ORAL at 08:09

## 2020-01-01 RX ADMIN — SUCRALFATE 1 G: 1 TABLET ORAL at 18:05

## 2020-01-01 RX ADMIN — Medication 10 ML: at 23:02

## 2020-01-01 RX ADMIN — HYDRALAZINE HYDROCHLORIDE 10 MG: 20 INJECTION INTRAMUSCULAR; INTRAVENOUS at 08:23

## 2020-01-01 RX ADMIN — SUCRALFATE 1 G: 1 TABLET ORAL at 21:08

## 2020-01-01 RX ADMIN — SODIUM CHLORIDE 10 MG/HR: 900 INJECTION, SOLUTION INTRAVENOUS at 22:46

## 2020-01-01 RX ADMIN — METOPROLOL TARTRATE 50 MG: 50 TABLET, FILM COATED ORAL at 22:02

## 2020-01-01 RX ADMIN — PANTOPRAZOLE 40 MG: 40 TABLET, DELAYED RELEASE ORAL at 18:05

## 2020-01-01 RX ADMIN — HALOPERIDOL 5 MG: 10 TABLET ORAL at 08:08

## 2020-01-01 RX ADMIN — PANTOPRAZOLE 40 MG: 40 TABLET, DELAYED RELEASE ORAL at 09:59

## 2020-01-01 RX ADMIN — THERA TABS 1 TABLET: TAB at 09:38

## 2020-01-01 RX ADMIN — PANTOPRAZOLE 40 MG: 40 TABLET, DELAYED RELEASE ORAL at 17:13

## 2020-01-01 RX ADMIN — METOPROLOL TARTRATE 50 MG: 50 TABLET, FILM COATED ORAL at 21:39

## 2020-01-01 RX ADMIN — SUCRALFATE 1 G: 1 TABLET ORAL at 15:52

## 2020-01-01 RX ADMIN — PANTOPRAZOLE 40 MG: 40 TABLET, DELAYED RELEASE ORAL at 19:10

## 2020-01-01 RX ADMIN — METOPROLOL TARTRATE 50 MG: 50 TABLET, FILM COATED ORAL at 21:08

## 2020-01-01 RX ADMIN — SUCRALFATE 1 G: 1 TABLET ORAL at 17:43

## 2020-01-01 RX ADMIN — SUCRALFATE 1 G: 1 TABLET ORAL at 16:43

## 2020-01-01 RX ADMIN — CLARITHROMYCIN 500 MG: 250 FOR SUSPENSION ORAL at 22:50

## 2020-01-01 RX ADMIN — FUROSEMIDE 20 MG: 20 TABLET ORAL at 08:08

## 2020-01-01 RX ADMIN — Medication 6 MG: at 21:07

## 2020-01-01 RX ADMIN — SODIUM CHLORIDE 8 MG/HR: 900 INJECTION INTRAVENOUS at 11:33

## 2020-01-01 RX ADMIN — HALOPERIDOL 5 MG: 10 TABLET ORAL at 17:26

## 2020-01-01 RX ADMIN — CLARITHROMYCIN 500 MG: 250 FOR SUSPENSION ORAL at 21:52

## 2020-01-01 RX ADMIN — AMOXICILLIN 1000 MG: 250 CAPSULE ORAL at 21:29

## 2020-01-01 RX ADMIN — FOLIC ACID 1 MG: 1 TABLET ORAL at 08:38

## 2020-01-01 RX ADMIN — SUCRALFATE 1 G: 1 TABLET ORAL at 06:31

## 2020-01-01 RX ADMIN — METOPROLOL TARTRATE 50 MG: 50 TABLET, FILM COATED ORAL at 09:22

## 2020-01-01 RX ADMIN — PROPOFOL 80 MG: 10 INJECTION, EMULSION INTRAVENOUS at 10:49

## 2020-01-01 RX ADMIN — FUROSEMIDE 20 MG: 20 TABLET ORAL at 10:37

## 2020-01-01 RX ADMIN — FUROSEMIDE 20 MG: 20 TABLET ORAL at 09:56

## 2020-01-01 RX ADMIN — FUROSEMIDE 20 MG: 20 TABLET ORAL at 08:33

## 2020-01-01 RX ADMIN — PANTOPRAZOLE 40 MG: 40 TABLET, DELAYED RELEASE ORAL at 09:07

## 2020-01-01 RX ADMIN — METOPROLOL TARTRATE 50 MG: 50 TABLET, FILM COATED ORAL at 21:29

## 2020-01-01 RX ADMIN — METOPROLOL TARTRATE 50 MG: 50 TABLET, FILM COATED ORAL at 09:35

## 2020-01-01 RX ADMIN — SUCRALFATE 1 G: 1 TABLET ORAL at 09:48

## 2020-01-01 RX ADMIN — SODIUM CHLORIDE 5 MG/HR: 900 INJECTION, SOLUTION INTRAVENOUS at 06:57

## 2020-01-01 RX ADMIN — HALOPERIDOL 5 MG: 10 TABLET ORAL at 17:01

## 2020-01-01 RX ADMIN — Medication 100 MG: at 10:28

## 2020-01-01 RX ADMIN — SUCRALFATE 1 G: 1 TABLET ORAL at 13:17

## 2020-01-01 RX ADMIN — Medication 10 ML: at 15:38

## 2020-01-01 RX ADMIN — HALOPERIDOL 5 MG: 10 TABLET ORAL at 08:33

## 2020-01-01 RX ADMIN — SUCRALFATE 1 G: 1 TABLET ORAL at 07:31

## 2020-01-01 RX ADMIN — DILTIAZEM HYDROCHLORIDE 5 MG/HR: 5 INJECTION INTRAVENOUS at 15:16

## 2020-01-01 RX ADMIN — DEXTROSE MONOHYDRATE 500 ML: 5 INJECTION, SOLUTION INTRAVENOUS at 11:04

## 2020-01-01 RX ADMIN — Medication 25 ML: at 22:00

## 2020-01-01 RX ADMIN — METOPROLOL TARTRATE 50 MG: 50 TABLET, FILM COATED ORAL at 23:17

## 2020-01-01 RX ADMIN — Medication 10 ML: at 06:45

## 2020-01-01 RX ADMIN — METOPROLOL TARTRATE 50 MG: 50 TABLET, FILM COATED ORAL at 21:32

## 2020-01-01 RX ADMIN — CIPROFLOXACIN 500 MG: 500 TABLET, FILM COATED ORAL at 08:40

## 2020-01-01 RX ADMIN — HALOPERIDOL 5 MG: 10 TABLET ORAL at 08:00

## 2020-01-01 RX ADMIN — METOPROLOL TARTRATE 25 MG: 25 TABLET, FILM COATED ORAL at 22:46

## 2020-01-01 RX ADMIN — THERA TABS 1 TABLET: TAB at 09:44

## 2020-01-01 RX ADMIN — HALOPERIDOL 5 MG: 10 TABLET ORAL at 09:36

## 2020-01-01 RX ADMIN — SUCRALFATE 1 G: 1 TABLET ORAL at 13:53

## 2020-01-01 RX ADMIN — METOPROLOL TARTRATE 50 MG: 50 TABLET, FILM COATED ORAL at 08:38

## 2020-01-01 RX ADMIN — PANTOPRAZOLE 40 MG: 40 TABLET, DELAYED RELEASE ORAL at 20:02

## 2020-01-01 RX ADMIN — HALOPERIDOL 5 MG: 10 TABLET ORAL at 17:06

## 2020-01-01 RX ADMIN — AMOXICILLIN 1000 MG: 250 CAPSULE ORAL at 21:25

## 2020-01-01 RX ADMIN — HALOPERIDOL 5 MG: 10 TABLET ORAL at 09:41

## 2020-01-01 RX ADMIN — METOPROLOL TARTRATE 50 MG: 50 TABLET, FILM COATED ORAL at 21:43

## 2020-01-01 RX ADMIN — LIDOCAINE HYDROCHLORIDE 80 MG: 20 INJECTION, SOLUTION EPIDURAL; INFILTRATION; INTRACAUDAL; PERINEURAL at 11:10

## 2020-01-01 RX ADMIN — METOPROLOL TARTRATE 50 MG: 50 TABLET, FILM COATED ORAL at 21:11

## 2020-01-01 RX ADMIN — ENOXAPARIN SODIUM 80 MG: 80 INJECTION SUBCUTANEOUS at 04:26

## 2020-01-01 RX ADMIN — SUCRALFATE 1 G: 1 TABLET ORAL at 06:41

## 2020-01-01 RX ADMIN — TAMSULOSIN HYDROCHLORIDE 0.4 MG: 0.4 CAPSULE ORAL at 21:32

## 2020-01-01 RX ADMIN — Medication 100 MG: at 11:02

## 2020-01-01 RX ADMIN — Medication 100 MG: at 09:41

## 2020-01-01 RX ADMIN — METOPROLOL TARTRATE 50 MG: 50 TABLET, FILM COATED ORAL at 21:19

## 2020-01-01 RX ADMIN — METOPROLOL TARTRATE 50 MG: 50 TABLET, FILM COATED ORAL at 10:33

## 2020-01-01 RX ADMIN — AMOXICILLIN 1000 MG: 250 CAPSULE ORAL at 10:00

## 2020-01-01 RX ADMIN — THERA TABS 1 TABLET: TAB at 10:37

## 2020-01-01 RX ADMIN — PANTOPRAZOLE 40 MG: 40 TABLET, DELAYED RELEASE ORAL at 08:38

## 2020-01-01 RX ADMIN — CIPROFLOXACIN 500 MG: 500 TABLET, FILM COATED ORAL at 08:09

## 2020-01-01 RX ADMIN — ENOXAPARIN SODIUM 80 MG: 80 INJECTION SUBCUTANEOUS at 17:00

## 2020-01-01 RX ADMIN — CLARITHROMYCIN 500 MG: 250 FOR SUSPENSION ORAL at 21:13

## 2020-01-01 RX ADMIN — FOLIC ACID 1 MG: 1 TABLET ORAL at 09:07

## 2020-01-01 RX ADMIN — PANTOPRAZOLE 40 MG: 40 TABLET, DELAYED RELEASE ORAL at 08:08

## 2020-01-01 RX ADMIN — CLARITHROMYCIN 500 MG: 250 FOR SUSPENSION ORAL at 10:12

## 2020-01-01 RX ADMIN — LORAZEPAM 1 MG: 2 INJECTION INTRAMUSCULAR; INTRAVENOUS at 03:09

## 2020-01-01 RX ADMIN — PANTOPRAZOLE 40 MG: 40 TABLET, DELAYED RELEASE ORAL at 08:09

## 2020-01-01 RX ADMIN — SUCRALFATE 1 G: 1 TABLET ORAL at 21:43

## 2020-01-01 RX ADMIN — FUROSEMIDE 20 MG: 20 TABLET ORAL at 10:01

## 2020-01-01 RX ADMIN — CLARITHROMYCIN 500 MG: 250 FOR SUSPENSION ORAL at 09:48

## 2020-01-01 RX ADMIN — Medication 6 MG: at 22:02

## 2020-01-01 RX ADMIN — AMOXICILLIN 1000 MG: 250 CAPSULE ORAL at 09:07

## 2020-01-01 RX ADMIN — FOLIC ACID 1 MG: 1 TABLET ORAL at 10:30

## 2020-01-01 RX ADMIN — SODIUM CHLORIDE 8 MG/HR: 900 INJECTION INTRAVENOUS at 13:32

## 2020-01-01 RX ADMIN — Medication 6 MG: at 21:43

## 2020-01-01 RX ADMIN — SUCRALFATE 1 G: 1 TABLET ORAL at 16:09

## 2020-01-01 RX ADMIN — CIPROFLOXACIN 500 MG: 500 TABLET, FILM COATED ORAL at 21:32

## 2020-01-01 RX ADMIN — FUROSEMIDE 20 MG: 20 TABLET ORAL at 09:07

## 2020-01-01 RX ADMIN — FOLIC ACID 1 MG: 1 TABLET ORAL at 08:26

## 2020-01-01 RX ADMIN — PANTOPRAZOLE 40 MG: 40 TABLET, DELAYED RELEASE ORAL at 17:06

## 2020-01-01 RX ADMIN — CLARITHROMYCIN 500 MG: 250 FOR SUSPENSION ORAL at 23:00

## 2020-01-01 RX ADMIN — Medication 100 MG: at 08:38

## 2020-01-01 RX ADMIN — FOLIC ACID 1 MG: 1 TABLET ORAL at 10:37

## 2020-01-01 RX ADMIN — METOPROLOL TARTRATE 50 MG: 50 TABLET, FILM COATED ORAL at 11:01

## 2020-01-01 RX ADMIN — PANTOPRAZOLE 40 MG: 40 TABLET, DELAYED RELEASE ORAL at 10:30

## 2020-01-01 RX ADMIN — THIAMINE HYDROCHLORIDE 500 MG: 100 INJECTION, SOLUTION INTRAMUSCULAR; INTRAVENOUS at 10:34

## 2020-01-01 RX ADMIN — Medication 10 ML: at 16:46

## 2020-01-01 RX ADMIN — AMOXICILLIN 1000 MG: 250 CAPSULE ORAL at 21:43

## 2020-01-01 RX ADMIN — THERA TABS 1 TABLET: TAB at 09:56

## 2020-01-01 RX ADMIN — PANTOPRAZOLE 40 MG: 40 TABLET, DELAYED RELEASE ORAL at 09:22

## 2020-01-01 RX ADMIN — CLARITHROMYCIN 500 MG: 250 FOR SUSPENSION ORAL at 21:58

## 2020-01-01 RX ADMIN — HALOPERIDOL 5 MG: 10 TABLET ORAL at 17:42

## 2020-01-01 RX ADMIN — THERA TABS 1 TABLET: TAB at 10:00

## 2020-01-01 RX ADMIN — THERA TABS 1 TABLET: TAB at 10:28

## 2020-01-01 RX ADMIN — SODIUM CHLORIDE 50 ML/HR: 900 INJECTION, SOLUTION INTRAVENOUS at 08:45

## 2020-01-01 RX ADMIN — THERA TABS 1 TABLET: TAB at 11:01

## 2020-01-01 RX ADMIN — SUCRALFATE 1 G: 1 TABLET ORAL at 08:09

## 2020-01-01 RX ADMIN — ENOXAPARIN SODIUM 70 MG: 100 INJECTION SUBCUTANEOUS at 18:22

## 2020-01-01 RX ADMIN — PROPOFOL 10 MG: 10 INJECTION, EMULSION INTRAVENOUS at 10:58

## 2020-01-01 RX ADMIN — PANTOPRAZOLE 40 MG: 40 TABLET, DELAYED RELEASE ORAL at 09:36

## 2020-01-01 RX ADMIN — FOLIC ACID 1 MG: 1 TABLET ORAL at 08:00

## 2020-01-01 RX ADMIN — CLARITHROMYCIN 500 MG: 250 FOR SUSPENSION ORAL at 21:39

## 2020-01-01 RX ADMIN — ENOXAPARIN SODIUM 70 MG: 100 INJECTION SUBCUTANEOUS at 05:21

## 2020-01-01 RX ADMIN — AMOXICILLIN 1000 MG: 250 CAPSULE ORAL at 21:39

## 2020-01-01 RX ADMIN — METOPROLOL TARTRATE 50 MG: 50 TABLET, FILM COATED ORAL at 10:01

## 2020-01-01 RX ADMIN — METOPROLOL TARTRATE 50 MG: 50 TABLET, FILM COATED ORAL at 10:37

## 2020-01-01 RX ADMIN — SUCRALFATE 1 G: 1 TABLET ORAL at 22:48

## 2020-01-01 RX ADMIN — Medication 10 ML: at 05:29

## 2020-01-01 RX ADMIN — METOPROLOL TARTRATE 50 MG: 50 TABLET, FILM COATED ORAL at 07:15

## 2020-01-01 RX ADMIN — Medication 6 MG: at 21:08

## 2020-01-01 RX ADMIN — SUCRALFATE 1 G: 1 TABLET ORAL at 12:32

## 2020-01-01 RX ADMIN — Medication 6 MG: at 21:11

## 2020-01-01 RX ADMIN — Medication 100 MG: at 09:35

## 2020-01-01 RX ADMIN — LORAZEPAM 1 MG: 1 TABLET ORAL at 05:32

## 2020-01-01 RX ADMIN — PANTOPRAZOLE 40 MG: 40 TABLET, DELAYED RELEASE ORAL at 11:02

## 2020-01-01 RX ADMIN — Medication 100 MG: at 08:33

## 2020-01-01 RX ADMIN — Medication 10 ML: at 21:15

## 2020-01-01 RX ADMIN — PANTOPRAZOLE 40 MG: 40 TABLET, DELAYED RELEASE ORAL at 18:31

## 2020-01-01 RX ADMIN — SUCRALFATE 1 G: 1 TABLET ORAL at 08:26

## 2020-01-01 RX ADMIN — AMOXICILLIN 1000 MG: 250 CAPSULE ORAL at 09:41

## 2020-01-01 RX ADMIN — CLARITHROMYCIN 500 MG: 250 FOR SUSPENSION ORAL at 10:51

## 2020-01-01 RX ADMIN — LORAZEPAM 2 MG: 2 INJECTION INTRAMUSCULAR; INTRAVENOUS at 15:59

## 2020-01-01 RX ADMIN — AMOXICILLIN 1000 MG: 250 CAPSULE ORAL at 09:44

## 2020-01-01 RX ADMIN — Medication 10 ML: at 16:09

## 2020-01-01 RX ADMIN — FOLIC ACID 1 MG: 1 TABLET ORAL at 08:08

## 2020-01-01 RX ADMIN — FUROSEMIDE 20 MG: 10 INJECTION, SOLUTION INTRAMUSCULAR; INTRAVENOUS at 15:36

## 2020-01-01 RX ADMIN — HALOPERIDOL 5 MG: 10 TABLET ORAL at 20:59

## 2020-01-01 RX ADMIN — THIAMINE HYDROCHLORIDE 500 MG: 100 INJECTION, SOLUTION INTRAMUSCULAR; INTRAVENOUS at 02:42

## 2020-01-01 RX ADMIN — SUCRALFATE 1 G: 1 TABLET ORAL at 06:58

## 2020-01-01 RX ADMIN — CIPROFLOXACIN 500 MG: 500 TABLET, FILM COATED ORAL at 11:01

## 2020-01-01 RX ADMIN — CLARITHROMYCIN 500 MG: 250 FOR SUSPENSION ORAL at 10:07

## 2020-01-01 RX ADMIN — SUCRALFATE 1 G: 1 TABLET ORAL at 17:24

## 2020-01-01 RX ADMIN — HALOPERIDOL 5 MG: 10 TABLET ORAL at 10:37

## 2020-10-29 PROBLEM — I48.91 ATRIAL FIBRILLATION WITH RAPID VENTRICULAR RESPONSE (HCC): Status: ACTIVE | Noted: 2020-01-01

## 2020-10-29 NOTE — ED NOTES
Repeat blood work drawn. Pt resting on stretcher, VS stable, afib rate controlled on cardizem gtt. Call bell in reach. Will continue to monitor.

## 2020-10-29 NOTE — ED NOTES
Provided hospital bed d/t extended time in ER. Awaiting stepdown bed. Urinal in reach. Call bell in reach.

## 2020-10-29 NOTE — ED PROVIDER NOTES
EMERGENCY DEPARTMENT HISTORY AND PHYSICAL EXAM 
 
4:13 PM 
 
 
Date: 10/29/2020 Patient Name: Bennie Bourne History of Presenting Illness Chief Complaint Patient presents with  Fall  Rapid Heart Rate History Provided By: Patient Location/Duration/Severity/Modifying factors Patient is a 44-year-old male without medical history however has not seen a doctor in many years the presents to the emergency department with a complaint of increasing confusion, memory loss, hallucinations, and most recently a fall. The patient notes he was at his daughter's wedding and was running in place and fell onto his right side. Some the history is provided by the patient's daughter who is at the bedside and notes that the patient was doing things he was not supposed to do and then fell and hit his face and his arm. Patient has a history of some mild memory loss however since the fall has been having increasing memory loss to the point that has been concerning the family. We will last week he has been seeing things that are not there and went to answer the door and walked to the window. The patient denies any headache or chest pain is not any medications. Patient has not had any fevers or chills. The patient does not have a primary doctor. There are no other aggravating or alleviating factors. Patient is a former drinker and denies any smoking or any drug use. PCP: None Current Facility-Administered Medications Medication Dose Route Frequency Provider Last Rate Last Dose  dilTIAZem (CARDIZEM) 100 mg in 0.9% sodium chloride 100 mL infusion  5 mg/hr IntraVENous CONTINUOUS Kenneth MARY MD 5 mL/hr at 10/29/20 1516 5 mg/hr at 10/29/20 1516  enoxaparin (LOVENOX) injection 70 mg  1 mg/kg SubCUTAneous Q12H Stephanie Berrios MD      
 
 
Past History Past Medical History: 
History reviewed. No pertinent past medical history. Past Surgical History: History reviewed. No pertinent surgical history. Family History: 
History reviewed. No pertinent family history. Social History: 
Social History Tobacco Use  Smoking status: Not on file Substance Use Topics  Alcohol use: Not on file  Drug use: Not on file Allergies: 
No Known Allergies Review of Systems Review of Systems Constitutional: Negative for activity change, fatigue and fever. HENT: Negative for congestion and rhinorrhea. Eyes: Negative for visual disturbance. Respiratory: Negative for shortness of breath. Cardiovascular: Negative for chest pain and palpitations. Gastrointestinal: Negative for abdominal pain, diarrhea, nausea and vomiting. Genitourinary: Negative for dysuria and hematuria. Musculoskeletal: Positive for arthralgias and myalgias. Negative for back pain. Skin: Negative for rash. Neurological: Negative for dizziness, weakness and light-headedness. Psychiatric/Behavioral: Positive for confusion and hallucinations. All other systems reviewed and are negative. Physical Exam  
 
Visit Vitals BP (!) 145/86 Pulse 85 Temp 98.4 °F (36.9 °C) Resp 20 Ht 5' 9\" (1.753 m) Wt 72.6 kg (160 lb) SpO2 99% BMI 23.63 kg/m² Physical Exam 
Vitals signs and nursing note reviewed. Constitutional:   
   General: He is not in acute distress. Appearance: He is well-developed. HENT:  
   Head: Normocephalic. Comments: Healed abrasions to the right side of the face Right Ear: External ear normal.  
   Left Ear: External ear normal.  
   Nose: Nose normal.  
Eyes:  
   General: No scleral icterus. Conjunctiva/sclera: Conjunctivae normal.  
   Pupils: Pupils are equal, round, and reactive to light. Neck: Musculoskeletal: Normal range of motion and neck supple. Thyroid: No thyromegaly. Vascular: No JVD. Trachea: No tracheal deviation. Cardiovascular: Rate and Rhythm: Tachycardia present. Rhythm irregular. Heart sounds: Normal heart sounds. No murmur. No friction rub. No gallop. Pulmonary:  
   Effort: Pulmonary effort is normal.  
   Breath sounds: Normal breath sounds. Chest:  
   Chest wall: No tenderness. Abdominal:  
   General: Bowel sounds are normal. There is no distension. Palpations: Abdomen is soft. Tenderness: There is no abdominal tenderness. There is no guarding or rebound. Musculoskeletal:     
   General: Tenderness present. Comments: Right humerus with pain from the proximal aspect of the mid shaft, full range of motion of the elbow, no wrist up off No midline back pain Gait steady Lymphadenopathy:  
   Cervical: No cervical adenopathy. Skin: 
   General: Skin is warm and dry. Capillary Refill: Capillary refill takes less than 2 seconds. Neurological:  
   Mental Status: He is alert. Cranial Nerves: No cranial nerve deficit. Coordination: Coordination normal.  
   Comments: Patient is oriented x2, no arm or leg drift, gait steady Unaware of date and current events Psychiatric:     
   Behavior: Behavior normal.     
   Thought Content: Thought content normal.     
   Judgment: Judgment normal.  
   Comments: Supportive daughter at the bedside Diagnostic Study Results Labs - Recent Results (from the past 12 hour(s)) EKG, 12 LEAD, INITIAL Collection Time: 10/29/20  2:36 PM  
Result Value Ref Range Ventricular Rate 146 BPM  
 Atrial Rate 104 BPM  
 QRS Duration 90 ms Q-T Interval 302 ms QTC Calculation (Bezet) 470 ms Calculated R Axis -35 degrees Calculated T Axis 105 degrees Diagnosis Atrial fibrillation with rapid ventricular response with premature  
ventricular or aberrantly conducted complexes Left axis deviation Voltage criteria for left ventricular hypertrophy Nonspecific ST and T wave abnormality , probably digitalis effect Abnormal ECG 
 No previous ECGs available Confirmed by Gerber Betancourt M.D., [address] ([id number]) on 10/29/2020 4:30:38 PM 
  
CBC WITH AUTOMATED DIFF Collection Time: 10/29/20  2:45 PM  
Result Value Ref Range WBC 6.5 4.6 - 13.2 K/uL  
 RBC 4.39 (L) 4.70 - 5.50 M/uL  
 HGB 13.1 13.0 - 16.0 g/dL HCT 43.6 36.0 - 48.0 % MCV 99.3 (H) 74.0 - 97.0 FL  
 MCH 29.8 24.0 - 34.0 PG  
 MCHC 30.0 (L) 31.0 - 37.0 g/dL  
 RDW 12.7 11.6 - 14.5 % PLATELET 836 (L) 828 - 420 K/uL MPV 12.3 (H) 9.2 - 11.8 FL  
 NEUTROPHILS 64 40 - 73 % LYMPHOCYTES 20 (L) 21 - 52 % MONOCYTES 10 3 - 10 % EOSINOPHILS 5 0 - 5 % BASOPHILS 1 0 - 2 %  
 ABS. NEUTROPHILS 4.2 1.8 - 8.0 K/UL  
 ABS. LYMPHOCYTES 1.3 0.9 - 3.6 K/UL  
 ABS. MONOCYTES 0.7 0.05 - 1.2 K/UL  
 ABS. EOSINOPHILS 0.3 0.0 - 0.4 K/UL  
 ABS. BASOPHILS 0.0 0.0 - 0.1 K/UL  
 DF AUTOMATED METABOLIC PANEL, COMPREHENSIVE Collection Time: 10/29/20  2:45 PM  
Result Value Ref Range Sodium 137 136 - 145 mmol/L Potassium 4.6 3.5 - 5.5 mmol/L Chloride 104 100 - 111 mmol/L  
 CO2 26 21 - 32 mmol/L Anion gap 7 3.0 - 18 mmol/L Glucose 120 (H) 74 - 99 mg/dL BUN 17 7.0 - 18 MG/DL Creatinine 1.18 0.6 - 1.3 MG/DL  
 BUN/Creatinine ratio 14 12 - 20 GFR est AA >60 >60 ml/min/1.73m2 GFR est non-AA 60 (L) >60 ml/min/1.73m2 Calcium 8.5 8.5 - 10.1 MG/DL Bilirubin, total 1.0 0.2 - 1.0 MG/DL  
 ALT (SGPT) 18 16 - 61 U/L  
 AST (SGOT) 29 10 - 38 U/L Alk. phosphatase 89 45 - 117 U/L Protein, total 6.9 6.4 - 8.2 g/dL Albumin 3.6 3.4 - 5.0 g/dL Globulin 3.3 2.0 - 4.0 g/dL A-G Ratio 1.1 0.8 - 1.7 MAGNESIUM Collection Time: 10/29/20  2:45 PM  
Result Value Ref Range Magnesium 2.3 1.6 - 2.6 mg/dL CARDIAC PANEL,(CK, CKMB & TROPONIN) Collection Time: 10/29/20  2:45 PM  
Result Value Ref Range CK - MB 1.0 <3.6 ng/ml CK-MB Index 1.1 0.0 - 4.0 % CK 88 39 - 308 U/L Troponin-I, QT 0.03 0.0 - 0.045 NG/ML  
PTT Collection Time: 10/29/20  2:45 PM  
Result Value Ref Range aPTT 23.1 23.0 - 36.4 SEC PROTHROMBIN TIME + INR Collection Time: 10/29/20  2:45 PM  
Result Value Ref Range Prothrombin time 14.6 11.5 - 15.2 sec INR 1.2 0.8 - 1.2 TSH 3RD GENERATION Collection Time: 10/29/20  2:45 PM  
Result Value Ref Range TSH 2.45 0.36 - 3.74 uIU/mL URINALYSIS W/ RFLX MICROSCOPIC Collection Time: 10/29/20  3:24 PM  
Result Value Ref Range Color DARK YELLOW Appearance CLEAR Specific gravity 1.018 1.005 - 1.030    
 pH (UA) 5.0 5.0 - 8.0 Protein TRACE (A) NEG mg/dL Glucose Negative NEG mg/dL Ketone Negative NEG mg/dL Bilirubin Negative NEG Blood Negative NEG Urobilinogen 1.0 0.2 - 1.0 EU/dL Nitrites Negative NEG Leukocyte Esterase TRACE (A) NEG URINE MICROSCOPIC ONLY Collection Time: 10/29/20  3:24 PM  
Result Value Ref Range RBC 0 to 3 0 - 5 /hpf Epithelial cells 1+ 0 - 5 /lpf Radiologic Studies -  
XR HUMERUS RT Final Result IMPRESSION:  
1. No acute osseous findings. XR CHEST SNGL V Final Result IMPRESSION:   
  
Increased interstitial lung markings without prior study for comparison. In the  
acute setting, differential considerations include, but are not limited to,  
pulmonary edema, atypical pneumonia or hypersensitivity pneumonitis. CT HEAD WO CONT Final Result IMPRESSION:   
  
Atrophy and microvascular disease. No acute intracranial process. All CT scans at this facility are performed using dose optimization technique as  
appropriate to the performed exam, to include automated exposure control,  
adjustment of the mA and/or kV according to patient's size (Including  
appropriate matching for site-specific examinations), or use of iterative  
reconstruction technique. Medical Decision Making I am the first provider for this patient. I reviewed the vital signs, available nursing notes, past medical history, past surgical history, family history and social history. Vital Signs-Reviewed the patient's vital signs. EKG: Atrial fibrillation with rapid ventricular response, PVCs, rate dependent ST depressions, no STEMI, interpreted by me Records Reviewed: Nursing Notes (Time of Review: 4:13 PM) ED Course: Progress Notes, Reevaluation, and Consults: 
 
 Patient CT scan shows some microvascular disease, cardiac labs are reassuring, electrolytes and TSH are reassuring as well. The patient has improved heart rate on Cardizem and is feeling better. At this point the patient has A. fib with RVR requiring IV control and I discussed case with Dr. Anitha Brahser and will admit the patient to a stepdown bed. Dr. Anitha Brasher reviewed the case would like patient to be started on Lovenox as well. I had a long talk with the patient and his family and will proceed with admission to the hospital. 
Provider Notes (Medical Decision Making): MDM Number of Diagnoses or Management Options Diagnosis management comments: The patient is a 67-year-old male that has a history of medical avoidance comes to the emergency department with increasing confusion per family and worsening since a fall 3 weeks ago. The patient presents with tachycardia and appears to be in atrial fibrillation with rapid ventricular response. Initially evaluated the patient and he is nonfocal however confused which I suspect is a progressive issue. We will follow CT of his head, x-rays right arm, follow his cardiac labs, start a Cardizem drip, TSH, and then reevaluate. Bridgett Workman DO 4:19 PM 
 
 
 
Procedures Critical Care Time: Critical Care Time: The services I provided to this patient were to treat and/or prevent clinically significant deterioration that could result in the failure of one or more body systems and/or organ systems due to atrial fibrillation with rapid ventricular response. Services included the following: 
-reviewing nursing notes and old charts 
-vital sign assessments 
-direct patient care 
-medication orders and management 
-interpreting and reviewing diagnostic studies/labs 
-re-evaluations 
-documentation time Aggregate critical care time was 40 minutes, which includes only time during which I was engaged in work directly related to the patient's care as described above, whether I was at bedside or elsewhere in the Emergency Department. It did not include time spent performing other reported procedures or the services of residents, students, nurses, or advance practice providers. Darell Buitrago DO 4:58 PM 
 
 
 
Diagnosis Clinical Impression: 1. Atrial fibrillation with rapid ventricular response ([address] 2. Confusion 3. Pain of right upper extremity Disposition: Admit Follow-up Information None Patient's Medications No medications on file Disclaimer: Sections of this note are dictated using utilizing voice recognition software. Minor typographical errors may be present. If questions arise, please do not hesitate to contact me or call our department.

## 2020-10-29 NOTE — ED TRIAGE NOTES
Per family, pt has not had medical care in 15 years and no known hx of dementia. They report mechanical fall 3 weeks ago, hit head on concrete, no LOC. Family states pt had episodes of confusion, forgetfulness and visual hallucinations occasionally prior to fall but that this has worsened since the fall. Pt also c/o RUE pain. 's-150's during triage; updated MD; EKG done. Pt denies chest pain or SOB.

## 2020-10-30 NOTE — ED NOTES
Bedside Hand-off-Report given to [employer], including  all care previously given. Addressed all questions asked by oncoming RN.

## 2020-10-30 NOTE — ED NOTES
Bed alarm sounding, pt found attempting to get out of bed, pulling gown and cardiac monitor leads off. Pt placed back in bed Dr. Maureen Carrizales notified.

## 2020-10-30 NOTE — ED NOTES
Pt attempting to get out of bed, pulling off cardiac monitor leads, stating \"I'm getting out of here. \" Informed pt he needs to get back into bed and reminded that he is in the hospital and needs to stay due to his state of health. Pt states \"try and stop me. \" Pt continues to attempting  to get out of bed, Dr. Naman Walsh notified, verbal order for soft restraint to one wrist obtained.

## 2020-10-30 NOTE — ED NOTES
Healthy choice turkey meal prepared for patient and brought to room, diet pepsi to pt per request.

## 2020-10-30 NOTE — ED NOTES
Pt got up pulled line and leads off, confused and wants to go home, MD made aware., will continue to monitor

## 2020-10-30 NOTE — ED NOTES
Bed alarm sounding, pt attempting to get out of bed pulling gown off. Pt gown placed back on, pt redirected back to bed.

## 2020-10-30 NOTE — ED NOTES
Purposeful rounding completed: 
 
Side rails up x 2:  YES Bed in low position and wheels locked: YES Call bell within reach: YES Comfort addressed: YES Toileting needs addressed: YES Plan of care reviewed/updated with patient and or family members: YES 
IV site assessed: YES Pain assessed and addressed: YES, 0 
 
 
 Alert-The patient is alert, awake and responds to voice. The patient is oriented to time, place, and person. The triage nurse is able to obtain subjective information.

## 2020-10-30 NOTE — ED NOTES
Patient found OOB, confused, and pulled out IV and pulling off leads. Provider informed. Patient will be moved to room closer to Nurse's station for closer observation

## 2020-10-30 NOTE — ED NOTES
Bedside and Verbal shift change report given to Celina (oncoming nurse) by Mendel Galeazzi RN (offgoing nurse). Report included the following information SBAR, Kardex, ED Summary, Procedure Summary, Intake/Output, MAR, Accordion and Recent Results.

## 2020-10-30 NOTE — CONSULTS
Spoke with ER MD for continuing plan of care while pt is awaiting for bed at [address]. He is stable Confusion has improved with ativan. Cardiac enzyme is flat. He is on lovenox and dilt gtt. Will continue with current care. Add labs for AM. Will need echo and cardiology follow up. I will see/evaluate this patient once he is at [address].   
 
 
Claus Rashid MD

## 2020-10-30 NOTE — ROUTINE PROCESS
TRANSFER - OUT REPORT: 
 
Verbal report given to Halie Iniguez RN(name) on Rubina Gastelum  being transferred to CVT Stepdown(unit) for routine progression of care Report consisted of patients Situation, Background, Assessment and  
Recommendations(SBAR). Information from the following report(s) SBAR, ED Summary, Procedure Summary, Intake/Output, MAR, Recent Results and Cardiac Rhythm a-fib was reviewed with the receiving nurse. Lines:  
Peripheral IV 10/30/20 Right;Upper Arm (Active) Site Assessment Clean, dry, & intact 10/30/20 0331 Phlebitis Assessment 0 10/30/20 0331 Infiltration Assessment 0 10/30/20 0331 Dressing Status Clean, dry, & intact 10/30/20 0331 Dressing Type Transparent 10/30/20 0331 Peripheral IV 10/30/20 Right Forearm (Active) Site Assessment Clean, dry, & intact 10/30/20 1119 Phlebitis Assessment 0 10/30/20 1119 Infiltration Assessment 0 10/30/20 1119 Dressing Status Clean, dry, & intact 10/30/20 1119 Dressing Type Transparent 10/30/20 1119 Opportunity for questions and clarification was provided. Patient transported with: 
 Monitor O2 @ 2 liters Cardizem gtt 5mg/hr

## 2020-10-30 NOTE — ED NOTES
Pt resting with eyes closed, awakened easily to voice. Pt v/u of plan of care and q6h blood draws with next blood draw due at 2330. Pt denies any needs/concerns at this time. Urinal emptied. Call bell in reach. Continues to deny SOB or chest pain. Will continue to monitor.  Lights dimmed per request.

## 2020-10-30 NOTE — H&P
Hospitalist Admission History and Physical 
 
NAME:  Jazmine Miranda :   1940 MRN:   [id number] PCP:  None 
Date/Time:  10/30/2020 7:52 PM 
Subjective: CHIEF COMPLAINT:  Patient does not know why he is in hospital , confusion per chart review HISTORY OF PRESENT ILLNESS:    
Mr. Tawanna Malone is a 78 yo  male with a past medical hx of HTN who presents from home to [facility] ED with confusion, memory loss, hallucinations and fall per chart review. History Is limited as patient does not know why he is in the hospital, he thinks he is here for a routine check up. Per Dr Abreu Dress note \"patient's daughter who is at the bedside and notes that the patient was doing things he was not supposed to do and then fell and hit his face and his arm. \"  
 
 
I personally reviewed all the ED notes from [facility]. Patient was OOB and confused, he pulled out his PIV and leads and gown. He kept trying to get OOB and leave the ED. He was given ativan and soft restraint on wrist.  
At the time of my evaluation, there is a sitter at bedside. The patient can not give a history as to what has been going on at home, he is calm and cooperative though, and In no distress. ED course:  
Vital signs: 98.4 F, , /120, RR 20, 96% on ??? Not documented. Labs remarkable for: troponin 0.03 to 0.03 to 0.05 to 0.04, probnp 5543, ammonia 41 Imaging: CT head Atrophy and microvascular disease. No acute intracranial process. Chest xray Increased interstitial lung markings. Xray right humerus no acute osseous findings. EKG: afib rvr  Medications received diltiazem, lovenox 70 mg, ativa, diltiazem drip Procedures performed:   
 
REVIEW OF SYSTEMS:   
 [x] Unable to obtain  ROS due to  [x]mental status change  []sedated   []intubated # Past medical history: HTN # Past surgical history: none per patient # Family history: # Medications: I have reviewed medications with patient - NONE # Allergies: none # Social history: patient lives with sister. patient ambulates without assistive device. Patient denies recreational drugs, alcohol, tobacco.  
# Specialists:  
# Family Contact:  
 
Past Medical History:  
Diagnosis Date  Ill-defined condition   
 mild memory loss with hallucinations History reviewed. No pertinent surgical history. Social History Tobacco Use  Smoking status: Not on file Substance Use Topics  Alcohol use: Not on file History reviewed. No pertinent family history. No Known Allergies None Objective: VITALS:   
Visit Vitals /89 Pulse 97 Temp 97.8 °F (36.6 °C) Resp 29 Ht 5' 9\" (1.753 m) Wt 72.6 kg (160 lb) SpO2 96% BMI 23.63 kg/m² Temp (24hrs), Av.4 °F (36.3 °C), Min:97.2 °F (36.2 °C), Max:97.8 °F (36.6 °C) PHYSICAL EXAM:  
General:    Elderly  man, laying in bed, no acute distress, appears stated age. Head:   Normocephalic, without obvious abnormality Eyes:   Conjunctivae clear, anicteric sclerae, pupils are equal 
Nose:  Nares normal, no drainage Throat:    Lips, mucosa, and tongue normal.   
Neck:  Supple, symmetrical, no JVD. Back:    No CVA tenderness. Lungs: On nasal cannula,  speaking in complete sentences, clear to auscultation bilaterally- no wheezing, rhonchi or rales Chest wall:  No tenderness or deformity. No Accessory muscle use. Heart:   irreg irreg rhythm; normal rate. no murmur, rub or gallop. Abdomen:   Soft, non-tender. Not distended. Bowel sounds normal. No masses Extremities: No LE edema. Skin:      Not Jaundiced There is an old ulcer on the anterior aspect of his right leg- does not appear infected. Psych:  Calm and cooperative and pleasant. Neurologic: Alert and oriented to person only, thinks he is in [place] and does not know the date/time. No facial asymmetry. No aphasia or slurred speech. Normal strength. Moving all extremities. Follows commands. LAB DATA REVIEWED:   
No components found for: [place] Recent Labs 10/30/20 
1115 10/29/20 
1445  137  
K 3.8 4.6  104 CO2 28 26 BUN 18 17 CREA 1.03 1.18  
GLU 92 120* CA 8.0* 8.5 ALB 3.1* 3.6 WBC 5.8 6.5 HGB 12.6* 13.1 HCT 39.9 43.6  121* IMAGING RESULTS: 
 []  I have personally reviewed the actual   []CXR  []CT scan CXR:  
XR Results (most recent): 
Results from Hospital Encounter encounter on 10/29/20 XR HUMERUS RT Narrative XR HUMERUS RT: 10/29/2020 4:00 PM 
 
CLINICAL INFORMATION Pain. Post fall. COMPARISON None. TECHNIQUE 
2 views of the left humerus FINDINGS No fracture or destructive osseous lesion. High riding humeral head suggestive of chronic rotator cuff insufficiency. Increased interstitial lung markings of the imaged right lung. Impression IMPRESSION: 
1. No acute osseous findings. CT :  
CT Results (most recent): 
Results from Hospital Encounter encounter on 10/29/20 CT HEAD WO CONT Narrative CT OF THE BRAIN 
 
COMPARISON: None. INDICATIONS: Fall and memory loss. TECHNIQUE: Volumetric data acquisition was performed   through the brain on a 
multislice scanner and reconstructed in the axial plane. FINDINGS:   
 
The ventricles and CSF spaces are enlarged consistent with age associated 
atrophy. There is no midline shift. [place] The brain parenchyma is of generally normal attenuation. There is decreased 
attenuation in the periventricular white matter consistent with presumed 
microvascular disease. There is no hemorrhage evident. There are no pathologic fluid collections. There are no pathologic calcifications. . 
. The visible portions of the paranasal sinuses: Are clear. Impression IMPRESSION:  
 
Atrophy and microvascular disease. No acute intracranial process.  
 
 
 
All CT scans at this facility are performed using dose optimization technique as 
 appropriate to the performed exam, to include automated exposure control, 
adjustment of the mA and/or kV according to patient's size (Including 
appropriate matching for site-specific examinations), or use of iterative 
reconstruction technique. CHEST XRAY IMPRESSION:  
Increased interstitial lung markings without prior study for comparison. In the 
acute setting, differential considerations include, but are not limited to, 
pulmonary edema, atypical pneumonia or hypersensitivity pneumonitis Assessment/Plan:  
  
Consults:  CARDIOLOGY 1. Atrial fibrillation with RVR - TSH wnl . The patient states he used to have afib back when he was \"smoking and doing stuff\" and was not on Anticoagulation at the time, this was many yrs ago per patient. 2. Elevated probnp - no evidence of volume overload on chest xray nor physical exam.  
3. Fall at home, unclear if mechanical or not. 4. Right UE pain secondary to fall at home- xray right humerus no acute findings. 5. Encephalopathy, unclear if this is acute or chronic, toxic or metabolic - UA no infection, chest xray suspicious for increased interstitial lung markings, ammonia level 41 , CT head negative for acute process. 6. Reports of hallucinations - visual 
7. Reports of memory loss 8. Has not received medical care in 15 yrs 9. Hx of recreational drug use per pt. 10. Hx of tobacco abuse  
 
- continue lovenox therapeutic dose of 70 mg IV BID. continue diltiazem drip. ECHO is pending. Needs Cardiology consult in the morning. remain on stepdown with telemetry monitoring.    
- check a1c and lipid panel. Check b12.  
- check UDS, etoh level is 9. The patient denies etoh intake. - wound care consult for ulcer on the right shin   
- needs PT OT fall precautions. Needs bell alarms. Soft wrist restraint for now. Frequent re-orientation. Sitter at bedside.  
- SLP precautions. Aspiration precautions. - nutrition consult. - needs to establish care with PCP  
- please call the family in the morning to obtain more information about history. Code status: FULL CODE per patient wishes, with sitter at bedside as witness DVT/GI prophylaxis: lovenox BID Diet: cardiac Disposition: tbd  
__________________________________________________ Risk of deterioration:  []Low    [x]Moderate  []High Care Plan discussed with: 
  [x]Patient   []Family    []ED Care Manager  []ED Doc   []Specialist : 
 
Total Time Coordinating Admission: 60     minutes []Total Critical Care Time:    
___________________________________________________ Admitting Physician: BERLIN Murray

## 2020-10-30 NOTE — ED NOTES
9:12 PM received pt in sign out, here being held being bed placement at [facility]. Pt currently rate controlled, hr curr 75, on diltiazem at 5 mg/hr. To cont close monitoring pending bed placement.

## 2020-10-31 NOTE — PROGRESS NOTES
0710-Bedside and Verbal shift change report received from  Mystic amboy ,RN (off going nurse). Report included the following information SBAR, Kardex, MAR and Recent Results. Assumed care w/ Cardizem drip@ 5ml/hr rate verified w/ night nurse,fall prevention program maintained,call bell and bedside table within reach. Sitter remains at bedside. Will continue care. 0746-Paged Navjot Wmamandasimin regarding BP 
 10/31/20 0200 10/31/20 0406 10/31/20 6449 Vitals BP (!) 147/108 (!) 141/87 (!) 155/101 MD called back will place some orders in. 
0823-Daughter called,update was given. Give room phone #,daughter said she will give update to all family members & will call patients room so they can speak directly with the patient. 0910-Patient for echo today but informed echo tech that patient is on Cardizem drip. Echo tech said she will have to do it at bedside. 1023-Echo done. 1025 BP was  Rechecked: 
 10/31/20 1027 Vitals /87 1227-Informed  about the BP still high. 10/31/20 1118 10/31/20 1146 Vitals BP (!) 194/113 (!) 151/107 MD increased Cardizem drip to 10 mg/hr. Order acknowledged. 1630-Patient was transferred from 2314 to 2310. Daughters were informed. 1925-Bedside and Verbal shift change report given to Arvind Garcia RN (oncoming nurse) by Austin Gastelum RN (offgoing nurse).  Report included the following information SBAR, Kardex, MAR and Recent Results.

## 2020-10-31 NOTE — PROGRESS NOTES
Patient is not available to be assessed at this time. Spoke with staff. May go back this afternoon or tomorrow. [address] [place] [address] Care  
[phone]

## 2020-10-31 NOTE — PROGRESS NOTES
[facility] Hospitalist Group Progress Note Patient: Pio Holder Age: 79 y.o. : 1940 MR#: [id number] SSN: [id number] Date/Time: 10/31/2020 Subjective:  
 
Patient seen and evaluated, sitting up in bed, no acute distress Assessment/Plan: 1. Atrial fibrillation with RVR - TSH wnl . The patient states he used to have afib back when he was \"smoking and doing stuff\" and was not on Anticoagulation at the time, this was many yrs ago per patient. 2. Elevated probnp - no evidence of volume overload on chest xray nor physical exam.  
3. Fall at home, unclear if mechanical or not. 4. Right UE pain secondary to fall at home- xray right humerus no acute findings. 5. Encephalopathy, unclear if this is acute or chronic, toxic or metabolic - UA no infection, chest xray suspicious for increased interstitial lung markings, ammonia level 41 , CT head negative for acute process. 6. Reports of hallucinations - visual 
7. Reports of memory loss 8. Has not received medical care in 15 yrs 9. Hx of recreational drug use per pt. 10. Hx of tobacco abuse  
  
- continue lovenox therapeutic dose of 70 mg IV BID. continue diltiazem drip. ECHO done, cardiology consulted per cardiology note  . Lopressor orally and try and wean down Cardizem drip, Lasix x1 today. Add aspirin for underlying coronary artery disease - check a1c and lipid panel. Check b12.  
- check UDS, etoh level is 9. The patient denies etoh intake. - wound care consult for ulcer on the right shin   
- needs PT OT fall precautions. Needs bell alarms. Soft wrist restraint for now. Frequent re-orientation. Sitter at bedside.  
- SLP precautions. Aspiration precautions. - nutrition consult. - needs to establish care with PCP  
- please call the family in the morning to obtain more information about history. Case discussed with:  [x]Patient  []Family  [x]Nursing  []Case Management DVT Prophylaxis:  [x]Lovenox  []Hep SQ  []SCDs  []Coumadin   []On Heparin gtt Objective:  
VS:  
Visit Vitals /81 Pulse 94 Temp 98.2 °F (36.8 °C) Resp 16 Ht 5' 9\" (1.753 m) Wt 78 kg (172 lb) SpO2 96% BMI 25.40 kg/m² Tmax/24hrs: Temp (24hrs), Av.7 °F (36.5 °C), Min:97.3 °F (36.3 °C), Max:98.3 °F (36.8 °C) IOBRIEF Intake/Output Summary (Last 24 hours) at 10/31/2020 1637 Last data filed at 10/31/2020 1630 Gross per 24 hour Intake 760 ml Output 300 ml Net 460 ml General:  Alert, cooperative, no acute distress HEENT: PERRLA, anicteric sclerae. Pulmonary:  CTA Bilaterally. No Wheezing/Rhonchi/Rales. Cardiovascular: Regular rate and Rhythm. GI:  Soft, Non distended, Non tender. + Bowel sounds. Extremities:  No edema, cyanosis, clubbing. No calf tenderness. Neurologic: Alert and oriented X 3. No acute neuro deficits. Additional: 
 
Medications:  
Current Facility-Administered Medications Medication Dose Route Frequency  dilTIAZem (CARDIZEM) 100 mg in 0.9% sodium chloride (MBP/ADV) 100 mL infusion  10 mg/hr IntraVENous CONTINUOUS  
 hydrALAZINE (APRESOLINE) 20 mg/mL injection 10 mg  10 mg IntraVENous Q6H PRN  
 metoprolol tartrate (LOPRESSOR) tablet 25 mg  25 mg Oral Q12H  furosemide (LASIX) injection 20 mg  20 mg IntraVENous DAILY  [START ON 2020] aspirin chewable tablet 81 mg  81 mg Oral DAILY  enoxaparin (LOVENOX) injection 80 mg  1 mg/kg SubCUTAneous Q12H  
 [START ON 2020] therapeutic multivitamin (THERAGRAN) tablet 1 Tab  1 Tab Oral DAILY  [START ON 2020] thiamine HCL (B-1) tablet 100 mg  100 mg Oral DAILY  [START ON ] folic acid (FOLVITE) tablet 1 mg  1 mg Oral DAILY  sodium chloride (NS) flush 5-40 mL  5-40 mL IntraVENous Q8H  
 sodium chloride (NS) flush 5-40 mL  5-40 mL IntraVENous PRN  
 acetaminophen (TYLENOL) tablet 650 mg  650 mg Oral Q6H PRN  Or  
  acetaminophen (TYLENOL) suppository 650 mg  650 mg Rectal Q6H PRN  polyethylene glycol (MIRALAX) packet 17 g  17 g Oral DAILY PRN  promethazine (PHENERGAN) tablet 12.5 mg  12.5 mg Oral Q6H PRN Or  
 ondansetron (ZOFRAN) injection 4 mg  4 mg IntraVENous Q6H PRN Imaging: XR Results (most recent): 
Results from Hospital Encounter encounter on 10/29/20 XR HUMERUS RT Narrative XR HUMERUS RT: 10/29/2020 4:00 PM 
 
CLINICAL INFORMATION Pain. Post fall. COMPARISON None. TECHNIQUE 
2 views of the left humerus FINDINGS No fracture or destructive osseous lesion. High riding humeral head suggestive of chronic rotator cuff insufficiency. Increased interstitial lung markings of the imaged right lung. Impression IMPRESSION: 
1. No acute osseous findings. CT Results (most recent): 
Results from Hospital Encounter encounter on 10/29/20 CT HEAD WO CONT Narrative CT OF THE BRAIN 
 
COMPARISON: None. INDICATIONS: Fall and memory loss. TECHNIQUE: Volumetric data acquisition was performed   through the brain on a 
multislice scanner and reconstructed in the axial plane. FINDINGS:   
 
The ventricles and CSF spaces are enlarged consistent with age associated 
atrophy. There is no midline shift. [place] The brain parenchyma is of generally normal attenuation. There is decreased 
attenuation in the periventricular white matter consistent with presumed 
microvascular disease. There is no hemorrhage evident. There are no pathologic fluid collections. There are no pathologic calcifications. . 
. The visible portions of the paranasal sinuses: Are clear. Impression IMPRESSION:  
 
Atrophy and microvascular disease. No acute intracranial process.  
 
 
 
All CT scans at this facility are performed using dose optimization technique as 
appropriate to the performed exam, to include automated exposure control, 
 adjustment of the mA and/or kV according to patient's size (Including 
appropriate matching for site-specific examinations), or use of iterative 
reconstruction technique. 10/29/20 ECHO ADULT COMPLETE 10/31/2020 10/31/2020 Narrative · LV: Estimated LVEF is 40 - 45%. Visually measured ejection fraction. Normal cavity size. Mild concentric hypertrophy. · RV: Mildly dilated right ventricle. Mildly reduced systolic function. · PA: Mild pulmonary hypertension. Pulmonary arterial systolic pressure is 46 mmHg. · IVC: Moderately elevated central venous pressure (10-15 mmHg); IVC  
diameter is larger than 21 mm and collapses more than 50% with  
respiration. Signed by: Asberry Nissen, MD  
  
 
Labs:   
Recent Results (from the past 50 hour(s)) CARDIAC PANEL,(CK, CKMB & TROPONIN) Collection Time: 10/29/20  5:39 PM  
Result Value Ref Range CK - MB <1.0 <3.6 ng/ml CK-MB Index  0.0 - 4.0 % CALCULATION NOT PERFORMED WHEN RESULT IS BELOW LINEAR LIMIT  
  39 - 308 U/L Troponin-I, QT 0.03 0.0 - 0.045 NG/ML  
CARDIAC PANEL,(CK, CKMB & TROPONIN) Collection Time: 10/29/20 11:48 PM  
Result Value Ref Range CK - MB <1.0 <3.6 ng/ml CK-MB Index  0.0 - 4.0 % CALCULATION NOT PERFORMED WHEN RESULT IS BELOW LINEAR LIMIT  
 CK 39 39 - 308 U/L Troponin-I, QT 0.05 (H) 0.0 - 0.045 NG/ML  
GLUCOSE, POC Collection Time: 10/29/20 11:48 PM  
Result Value Ref Range Glucose (POC) 103 70 - 110 mg/dL CARDIAC PANEL,(CK, CKMB & TROPONIN) Collection Time: 10/30/20  8:14 AM  
Result Value Ref Range CK - MB 1.2 <3.6 ng/ml CK-MB Index 1.4 0.0 - 4.0 % CK 83 39 - 308 U/L Troponin-I, QT 0.04 0.0 - 0.045 NG/ML  
CBC W/O DIFF Collection Time: 10/30/20 11:15 AM  
Result Value Ref Range WBC 5.8 4.6 - 13.2 K/uL  
 RBC 3.97 (L) 4.70 - 5.50 M/uL  
 HGB 12.6 (L) 13.0 - 16.0 g/dL HCT 39.9 36.0 - 48.0 %  .5 (H) 74.0 - 97.0 FL  
 MCH 31.7 24.0 - 34.0 PG  
 MCHC 31.6 31.0 - 37.0 g/dL  
 RDW 12.7 11.6 - 14.5 % PLATELET 767 062 - 761 K/uL MPV 11.3 9.2 - 82.1 FL  
METABOLIC PANEL, COMPREHENSIVE Collection Time: 10/30/20 11:15 AM  
Result Value Ref Range Sodium 140 136 - 145 mmol/L Potassium 3.8 3.5 - 5.5 mmol/L Chloride 107 100 - 111 mmol/L  
 CO2 28 21 - 32 mmol/L Anion gap 5 3.0 - 18 mmol/L Glucose 92 74 - 99 mg/dL BUN 18 7.0 - 18 MG/DL Creatinine 1.03 0.6 - 1.3 MG/DL  
 BUN/Creatinine ratio 17 12 - 20 GFR est AA >60 >60 ml/min/1.73m2 GFR est non-AA >60 >60 ml/min/1.73m2 Calcium 8.0 (L) 8.5 - 10.1 MG/DL Bilirubin, total 0.9 0.2 - 1.0 MG/DL  
 ALT (SGPT) 13 (L) 16 - 61 U/L  
 AST (SGOT) 10 10 - 38 U/L Alk. phosphatase 73 45 - 117 U/L Protein, total 6.4 6.4 - 8.2 g/dL Albumin 3.1 (L) 3.4 - 5.0 g/dL Globulin 3.3 2.0 - 4.0 g/dL A-G Ratio 0.9 0.8 - 1.7 AMMONIA Collection Time: 10/30/20 11:15 AM  
Result Value Ref Range Ammonia 41 (H) 11 - 32 UMOL/L  
NT-PRO BNP Collection Time: 10/30/20 11:15 AM  
Result Value Ref Range NT pro-BNP 5,543 (H) 0 - 1,800 PG/ML  
LIPID PANEL Collection Time: 10/30/20 11:15 AM  
Result Value Ref Range LIPID PROFILE Cholesterol, total 112 <200 MG/DL Triglyceride 63 <150 MG/DL  
 HDL Cholesterol 36 (L) 40 - 60 MG/DL  
 LDL, calculated 63.4 0 - 100 MG/DL VLDL, calculated 12.6 MG/DL  
 CHOL/HDL Ratio 3.1 0 - 5.0    
ETHYL ALCOHOL Collection Time: 10/30/20 11:15 AM  
Result Value Ref Range ALCOHOL(ETHYL),SERUM 9 (H) 0 - 3 MG/DL  
VITAMIN B12 & FOLATE Collection Time: 10/31/20  1:53 AM  
Result Value Ref Range Vitamin B12 311 211 - 911 pg/mL Folate 13.1 3.10 - 17.50 ng/mL LIPID PANEL Collection Time: 10/31/20  1:53 AM  
Result Value Ref Range LIPID PROFILE Cholesterol, total 103 <200 MG/DL  Triglyceride 61 <150 MG/DL  
 HDL Cholesterol 34 (L) 40 - 60 MG/DL  
 LDL, calculated 56.8 0 - 100 MG/DL  
 VLDL, calculated 12.2 MG/DL  
 CHOL/HDL Ratio 3.0 0 - 5.0 HEMOGLOBIN A1C W/O EAG Collection Time: 10/31/20  1:53 AM  
Result Value Ref Range Hemoglobin A1c 5.2 4.2 - 5.6 % CBC WITH AUTOMATED DIFF Collection Time: 10/31/20  1:53 AM  
Result Value Ref Range WBC 6.6 4.6 - 13.2 K/uL  
 RBC 3.85 (L) 4.70 - 5.50 M/uL  
 HGB 12.1 (L) 13.0 - 16.0 g/dL HCT 37.7 36.0 - 48.0 % MCV 97.9 (H) 74.0 - 97.0 FL  
 MCH 31.4 24.0 - 34.0 PG  
 MCHC 32.1 31.0 - 37.0 g/dL  
 RDW 13.0 11.6 - 14.5 % PLATELET 526 079 - 282 K/uL MPV 11.7 9.2 - 11.8 FL  
 NEUTROPHILS 60 40 - 73 % LYMPHOCYTES 26 21 - 52 % MONOCYTES 10 3 - 10 % EOSINOPHILS 3 0 - 5 % BASOPHILS 1 0 - 2 %  
 ABS. NEUTROPHILS 4.1 1.8 - 8.0 K/UL  
 ABS. LYMPHOCYTES 1.7 0.9 - 3.6 K/UL  
 ABS. MONOCYTES 0.6 0.05 - 1.2 K/UL  
 ABS. EOSINOPHILS 0.2 0.0 - 0.4 K/UL  
 ABS. BASOPHILS 0.0 0.0 - 0.1 K/UL  
 DF AUTOMATED METABOLIC PANEL, BASIC Collection Time: 10/31/20  1:53 AM  
Result Value Ref Range Sodium 142 136 - 145 mmol/L Potassium 3.7 3.5 - 5.5 mmol/L Chloride 110 100 - 111 mmol/L  
 CO2 27 21 - 32 mmol/L Anion gap 5 3.0 - 18 mmol/L Glucose 94 74 - 99 mg/dL BUN 18 7.0 - 18 MG/DL Creatinine 1.04 0.6 - 1.3 MG/DL  
 BUN/Creatinine ratio 17 12 - 20 GFR est AA >60 >60 ml/min/1.73m2 GFR est non-AA >60 >60 ml/min/1.73m2 Calcium 8.4 (L) 8.5 - 10.1 MG/DL MAGNESIUM Collection Time: 10/31/20  1:53 AM  
Result Value Ref Range Magnesium 2.4 1.6 - 2.6 mg/dL PHOSPHORUS Collection Time: 10/31/20  1:53 AM  
Result Value Ref Range Phosphorus 3.2 2.5 - 4.9 MG/DL  
ECHO ADULT COMPLETE Collection Time: 10/31/20 10:15 AM  
Result Value Ref Range IVSd 1.22 (A) 0.6 - 1.0 cm LVIDd 5.02 4.2 - 5.9 cm LVIDs 3.92 cm  
 LVOT d 2.02 cm  
 LVPWd 1.25 (A) 0.6 - 1.0 cm  
 LVOT Cardiac Output 4.91 liter/minute LVOT Peak Gradient 2.34 mmHg Left Ventricular Outflow Tract Mean Gradient 0.99 mmHg LVOT SV 42.2 mL  
 LVOT Peak Velocity 76.54 cm/s LVOT VTI 13.21 cm  
 LA Volume 58.41 18 - 58 mL  
 LA Area 4C 18.40 cm2 LA Vol 2C 59.97 (A) 18 - 58 mL  
 LA Vol 4C 48.45 18 - 58 mL Tapse 1.55 1.5 - 2.0 cm Triscuspid Valve Regurgitation Peak Gradient 38.28 mmHg  
 TR Max Velocity 309.34 cm/s Ao Root D 3.43 cm IVC proximal 2.60 cm LV Mass .9 88 - 224 g LV Mass AL Index 126.4 49 - 115 g/m2 LA Vol Index 30.15 16 - 28 ml/m2 LA Vol Index 30.95 16 - 28 ml/m2 LA Vol Index 25.01 16 - 28 ml/m2 Signed By: Александр Veras MD   
 October 31, 2020 I spent 35 minutes with the patient in face-to-face consultation, of which greater than 50% was spent in counseling and coordination of care as described above Disclaimer: Sections of this note are dictated using utilizing voice recognition software. Minor typographical errors may be present. If questions arise, please do not hesitate to contact me or call our department.

## 2020-10-31 NOTE — CONSULTS
Comprehensive Nutrition Assessment Type and Reason for Visit: Initial, Consult(Oral Nutrition Supplements) Nutrition Recommendations/Plan: - Add supplements: Ensure Enlive TID, daily multivitamin, thiamine and folic acid. Nutrition Assessment:  Tolerating diet with fair appetite/meal intake, confusion improving per nursing. Denies alcohol intake PTA, positive on admission screen. Agreeable to Ensure. Malnutrition Assessment: 
Malnutrition Status:  No malnutrition Nutrition History and Allergies: Past medical history of HTN who presents from home to [facility] ED with confusion, memory loss, hallucinations and fall per chart review. Reports fair meal intake PTA with usual weight of 155 lb. NKFA. Estimated Daily Nutrient Needs: 
Energy (kcal): 1151-0320; Weight Used for Energy Requirements: Current Protein (g): 62-78; Weight Used for Protein Requirements: Current Fluid (ml/day): 2723-8443; Weight Used for Fluid Requirements: Current Nutrition Related Findings:  BM PTA Wounds:  (abrasions to leg and elbow) Current Nutrition Therapies: DIET CARDIAC Regular Anthropometric Measures: 
· Height:  5' 9\" (175.3 cm) · Current Body Wt:  78 kg (171 lb 15.3 oz) · Admission Body Wt:  160 lb 0.9 oz   
· Usual Body Wt:  70.3 kg (155 lb) · Ideal Body Wt:  160 lbs:  107.5 % · BMI Category: Overweight (BMI 25.0-29. 9) Nutrition Diagnosis:  
· Inadequate oral intake related to cognitive or neurological impairment, early satiety as evidenced by intake 26-50% Nutrition Interventions:  
Food and/or Nutrient Delivery: Continue current diet, Start oral nutrition supplement, Vitamin supplement, Mineral supplement Nutrition Education and Counseling: Education not indicated Coordination of Nutrition Care: Continue to monitor while inpatient, Coordination of community care, Swallow evaluation(patient discussed with nursing) Goals: PO nutrition intake will meet >75% of patient estimated nutritional needs within the next 7 days. Nutrition Monitoring and Evaluation:  
Behavioral-Environmental Outcomes: None identified Food/Nutrient Intake Outcomes: Diet advancement/tolerance, Food and nutrient intake, Supplement intake, Vitamin/mineral intake Physical Signs/Symptoms Outcomes: Biochemical data, Chewing or swallowing, Meal time behavior, Nutrition focused physical findings Discharge Planning: Too soon to determine Electronically signed by Bernardo Marks RD, [address]  on 10/31/2020 at 3:27 PM 
 
Contact: [phone]

## 2020-10-31 NOTE — PROGRESS NOTES
ST orders received; however, upon completion of chart review and discussion with RN, patient not appropriate for ST evaluation this day. Currently, patient is: 
 
Mauricio.Boor ] Tolerating current po diet (per RN report) [ ] Tolerating current po diet; however, poor po intake (per RN report) [ ] Receiving nutrition via tube feeding [ ] Lethargic, somnolent or difficult to arouse for safe po trials [ ] Unable to manage secretions [ ] Receiving intervention for respiratory distress (excluding O2 via nasal cannula) [ ] <6 hours status post extubation  
[ ] Other:  
 
Please contact SLP at  with questions or concerns. SLP will follow up next day. Thank you, Jennie Gonzalez M.S. [facility] SLP

## 2020-10-31 NOTE — CONSULTS
[facility] - Consult Note Consultation request by Dr. Brenda Caro for a.fib/RVR Date of  Admission: 10/29/2020  2:23 PM  
Primary Care Physician:  None Assessment:  
 
-A.fib w/RVR, new diagnosis per patient 
-Cardiomyopathy, new, EF 40-45% by echo 10/31/20 
-Indeterminate troponin 0.05 max, not consistent with primary ACS, however, cannot exclude underlying CAD 
-New acute systolic heart failure, elevated pro-BNP of 5543 at presentation with edema by CXR 
-Confusion, memory loss, ongoing workup -s/p fall about 3 weeks ago 
-HTN, new diagnosis No primary cardiologist or primary physician Plan:  
 
Patient has new diagnosis of A.fib/RVR and cardiomyopathy with EF 45% and CHF. Plan to control rate by starting lopressor orally today and wean down diltiazem drip as needed. Can add ACEI tomorrow. Lasix x 1 today. Poor candidate for Play[employer] if falls continue. Currently on therapeutic lovenox. Add ASA in case underlying CAD. History of Present Illness: This is a 79 y.o. male admitted for Atrial fibrillation with rapid ventricular response ([facility] [address] [I48.91]. Patient complains of:   
Presented to ER 10/30/20 with confusion by family with hallucinations. Patient states he had a fall at a wedding about 3 weeks ago, no LOC just \"stumbled. Patient poor historian and lives alone but able to relate he doesn't regularly see a physician. He doesn't take any medications. No ETOH/tobacco use currently. Denies chest pain or dyspnea during my exam.  Asked to see for A.fib/RVR, on diltiazem drip currently. Review of Symptoms:  Except as stated above include: 
Constitutional:  Negative Ears, nose, throat:  Negative Respiratory:  negative Cardiovascular:  Rare palpitations Gastrointestinal: negative Genitourinary:  negative Musculoskeletal:  Negative Neurological:  Confusion, fall Dermatological:  Negative Hematological: Negative Endocrinological: Negative Allergy: Negative Psychological:  Hallucination by report Past Medical History:  
 
Past Medical History:  
Diagnosis Date  Ill-defined condition   
 mild memory loss with hallucinations Social History:  
 
Social History Socioeconomic History  Marital status: LEGALLY  Spouse name: Not on file  Number of children: Not on file  Years of education: Not on file  Highest education level: Not on file Family History:  
History reviewed. No pertinent family history. Medications:  
No Known Allergies Current Facility-Administered Medications Medication Dose Route Frequency  dilTIAZem (CARDIZEM) 100 mg in 0.9% sodium chloride (MBP/ADV) 100 mL infusion  10 mg/hr IntraVENous CONTINUOUS  
 hydrALAZINE (APRESOLINE) 20 mg/mL injection 10 mg  10 mg IntraVENous Q6H PRN  
 sodium chloride (NS) flush 5-40 mL  5-40 mL IntraVENous Q8H  
 sodium chloride (NS) flush 5-40 mL  5-40 mL IntraVENous PRN  
 acetaminophen (TYLENOL) tablet 650 mg  650 mg Oral Q6H PRN Or  
 acetaminophen (TYLENOL) suppository 650 mg  650 mg Rectal Q6H PRN  polyethylene glycol (MIRALAX) packet 17 g  17 g Oral DAILY PRN  promethazine (PHENERGAN) tablet 12.5 mg  12.5 mg Oral Q6H PRN Or  
 ondansetron (ZOFRAN) injection 4 mg  4 mg IntraVENous Q6H PRN  
 enoxaparin (LOVENOX) injection 70 mg  1 mg/kg SubCUTAneous Q12H Physical Exam:  
 
Visit Vitals BP (!) 146/101 Pulse (!) 106 Temp 98.1 °F (36.7 °C) Resp 19 Ht 5' 9\" (1.753 m) Wt 78 kg (172 lb) SpO2 99% BMI 25.40 kg/m² BP Readings from Last 3 Encounters:  
10/31/20 (!) 146/101 Pulse Readings from Last 3 Encounters:  
10/31/20 (!) 106 Wt Readings from Last 3 Encounters:  
10/31/20 78 kg (172 lb) General:  Confused but no acute distress, oriented person, place, time Neck:  nontender Lungs: decreased Heart:  irreg, S1, S2 normal, no murmur, click, rub or gallop Abdomen:  abdomen is soft without significant tenderness, masses, organomegaly or guarding Extremities:  extremities normal, atraumatic, no cyanosis or edema Skin: Warm and dry. no hyperpigmentation, vitiligo, or suspicious lesions Neuro: alert, oriented x3, affect appropriate, no focal neurological deficits, moves all extremities well, no involuntary movements, reflexes at knee and ankle intact Psych: non focal 
 
 Data Review:  
 
Recent Labs 10/31/20 
0153 10/30/20 
1115 10/29/20 
1445 WBC 6.6 5.8 6.5 HGB 12.1* 12.6* 13.1 HCT 37.7 39.9 43.6  200 121* Recent Labs 10/31/20 
0153 10/30/20 
1115 10/29/20 
1445  140 137  
K 3.7 3.8 4.6  107 104 CO2 27 28 26 GLU 94 92 120* BUN 18 18 17 CREA 1.04 1.03 1.18  
CA 8.4* 8.0* 8.5 MG 2.4  --  2.3 PHOS 3.2  --   --   
ALB  --  3.1* 3.6 ALT  --  13* 18 INR  --   --  1.2 Results for orders placed or performed during the hospital encounter of 10/29/20 EKG, 12 LEAD, INITIAL Result Value Ref Range Ventricular Rate 146 BPM  
 Atrial Rate 104 BPM  
 QRS Duration 90 ms Q-T Interval 302 ms QTC Calculation (Bezet) 470 ms Calculated R Axis -35 degrees Calculated T Axis 105 degrees Diagnosis Atrial fibrillation with rapid ventricular response with premature  
ventricular or aberrantly conducted complexes Left axis deviation Voltage criteria for left ventricular hypertrophy Nonspecific ST and T wave abnormality , probably digitalis effect Abnormal ECG No previous ECGs available Confirmed by Marianela Aguirre M.D., [address] [place] [address] ([id number]) on 10/29/2020 4:30:38 PM 
  
 
 
All Cardiac Markers in the last 24 hours:  No results found for: CPK, CK, CKMMB, CKMB, RCK3, CKMBT, CKNDX, CKND1, MARTHA, TROPT, TROIQ, BOBBY, TROPT, TNIPOC, BNP, BNPP Last Lipid:   
Lab Results Component Value Date/Time  Cholesterol, total 103 10/31/2020 01:53 AM  
 HDL Cholesterol 34 (L) 10/31/2020 01:53 AM  
 LDL, calculated 56.8 10/31/2020 01:53 AM  
 Triglyceride 61 10/31/2020 01:53 AM  
 CHOL/HDL Ratio 3.0 10/31/2020 01:53 AM  
 
 
Signed By: Fang Tejada MD   
 October 31, 2020

## 2020-10-31 NOTE — PROGRESS NOTES
Problem: Falls - Risk of 
Goal: *Absence of Falls Description: Document Nik Leary Fall Risk and appropriate interventions in the flowsheet. Outcome: Progressing Towards Goal 
Note: Fall Risk Interventions: 
Mobility Interventions: Assess mobility with egress test, Bed/chair exit alarm, Communicate number of staff needed for ambulation/transfer, OT consult for ADLs, Patient to call before getting OOB, PT Consult for mobility concerns, Strengthening exercises (ROM-active/passive), Utilize walker, cane, or other assistive device Mentation Interventions: Bed/chair exit alarm, Door open when patient unattended, Family/sitter at bedside, Increase mobility, Reorient patient, Toileting rounds, Update white board Medication Interventions: Bed/chair exit alarm, Evaluate medications/consider consulting pharmacy, Patient to call before getting OOB, Teach patient to arise slowly, Assess postural VS orthostatic hypotension History of Falls Interventions: Investigate reason for fall, Bed/chair exit alarm Problem: Patient Education: Go to Patient Education Activity Goal: Patient/Family Education Outcome: Progressing Towards Goal

## 2020-11-01 NOTE — ROUTINE PROCESS
Bedside and Verbal shift change report given to LU Chirinos (oncoming nurse) by Meenakshi Cervantes RN (offgoing nurse).  Report included the following information SBAR, Kardex, MAR and Recent Results.

## 2020-11-01 NOTE — PROGRESS NOTES
0800: Bedside and Verbal shift change report given to Candis RN (oncoming nurse) by Ace Holland RN (offgoing nurse). Report included the following information SBAR, Kardex, Intake/Output, MAR, Recent Results and Cardiac Rhythm NSR. 
 
1900: Bedside and Verbal shift change report given to Delphine RN (oncoming nurse) by Yumi Vera RN (offgoing nurse). Report included the following information SBAR, Kardex, Intake/Output, MAR, Recent Results and Cardiac Rhythm NSR.

## 2020-11-01 NOTE — PROGRESS NOTES
Problem: Falls - Risk of 
Goal: *Absence of Falls Description: Document Richard Merlin Fall Risk and appropriate interventions in the flowsheet. Outcome: Progressing Towards Goal 
Note: Fall Risk Interventions: 
Mobility Interventions: Communicate number of staff needed for ambulation/transfer, Bed/chair exit alarm, Patient to call before getting OOB, PT Consult for mobility concerns, PT Consult for assist device competence, OT consult for ADLs Mentation Interventions: Adequate sleep, hydration, pain control, Bed/chair exit alarm, Door open when patient unattended, Increase mobility, More frequent rounding, Reorient patient, Room close to nurse's station, Toileting rounds, Update white board Medication Interventions: Patient to call before getting OOB, Evaluate medications/consider consulting pharmacy, Assess postural VS orthostatic hypotension Elimination Interventions: Bed/chair exit alarm, Call light in reach, Patient to call for help with toileting needs, Toileting schedule/hourly rounds, Urinal in reach History of Falls Interventions: Bed/chair exit alarm, Consult care management for discharge planning, Door open when patient unattended, Evaluate medications/consider consulting pharmacy, Investigate reason for fall, Room close to nurse's station Problem: Patient Education: Go to Patient Education Activity Goal: Patient/Family Education Outcome: Progressing Towards Goal 
  
Problem: Non-Violent Restraints Goal: *Removal from restraints as soon as assessed to be safe Outcome: Progressing Towards Goal 
Goal: *No harm/injury to patient while restraints in use Outcome: Progressing Towards Goal 
Goal: *Patient's dignity will be maintained Outcome: Progressing Towards Goal 
Goal: *Patient Specific Goal (EDIT GOAL, INSERT TEXT) Outcome: Progressing Towards Goal 
Goal: Non-violent Restaints:Standard Interventions Outcome: Progressing Towards Goal 
 Goal: Non-violent Restraints:Patient Interventions Outcome: Progressing Towards Goal 
Goal: Patient/Family Education Outcome: Progressing Towards Goal 
  
Problem: Nutrition Deficit Goal: *Optimize nutritional status Outcome: Progressing Towards Goal

## 2020-11-01 NOTE — PROGRESS NOTES
SLP rec'd evaluation & treat orders. Chart reviewed and upon initial interview, it is deemed that a formal evaluation is not indicated. Observed pt with serial swallows of thin liquids; 0 overt distress noted. Accordingly, SLP will discontinue MD orders, as evaluation not indicated. SLP available for formal evaluation if further indicated by MD. 
 
SLP educated pt on role of speech therapist in current setting with re: speech/swallow; verbalized comprehension. Results d/w RN, Javon Brooks Thank you for this referral. 
Iza Kirby M.S. [facility] SLP

## 2020-11-01 NOTE — PROGRESS NOTES
Problem: Self Care Deficits Care Plan (Adult) Goal: *Acute Goals and Plan of Care (Insert Text) Description: Occupational Therapy Goals Initiated 11/1/2020 within 7 day(s). 1.  Patient will perform grooming standing at the sink with supervision/set-up for 5 minutes. 2.  Patient will perform upper body dressing with supervision/set-up utilizing compensatory strategy. 3.  Patient will perform lower body dressing with supervision/set-up utilizing energy conservation techniques and/or AE as needed. 4.  Patient will perform toilet transfers with Supervision. 5.  Patient will perform all aspects of toileting with supervision/set-up. 6.  Patient will utilize energy conservation techniques during functional activities with verbal cues. Prior Level of Function: Pt reports living alone in private residence with family nearby. Pt reports being previously independent with I/ADLs, including driving. Pt has no DME available at home. Outcome: Progressing Towards Goal 
OCCUPATIONAL THERAPY EVALUATION Patient: Claudio Marin (39 y.o. male) Date: 11/1/2020 Primary Diagnosis: Atrial fibrillation with rapid ventricular response ([facility]) [I48.91] Precautions:  Fall ASSESSMENT : 
Pt cleared to participate in OT evaluation by RN. Upon entering room, pt received in locked recliner, alert, and agreeable to OT eval/treatment. Pt seen in conjunction with PT to maximize safety of patient and staff members. Based on the objective data described below, the patient presents with decreased UB strength, decreased activity tolerance, decreased safety awareness, decreased ability to safely perform ADLs, requiring increased assistance from others. Pt c/o pain in RUE with movement.  Pt demonstrates ~20% of active R shoulder flexion (no fracture per x-ray report), however, able to achieve full ROM pain-free when using LUE to assist. Pt educated on adaptive strategies for UB dressing to prevent pain in RUE, pt required Min A to don gown simulating jacket. Pt required CGA to don socks while long sitting in bed, noted SOB following bending fwd. O2 sats remained WFL throughout evaluation. Pt educated on pacing and PLB techniques to utilize throughout tasks. Pt maneuvered to EOB with SBA, and required CGA to std in preparation for functional txfrs. Pt initially c/o dizziness in std which improved following 2x repetitions, following which performed functional mobility around the room and in the hallway w/o AD benefiting from [id number] for safety due to slight impulsivity. Patient will benefit from skilled intervention to address the above impairments. Patient's rehabilitation potential is considered to be Good Factors which may influence rehabilitation potential include:  
[]             None noted [x]             Mental ability/status [x]             Medical condition [x]             Home/family situation and support systems [x]             Safety awareness [x]             Pain tolerance/management 
[]             Other: PLAN : 
Recommendations and Planned Interventions:  
[x]               Self Care Training                  [x]      Therapeutic Activities [x]               Functional Mobility Training   [x]      Cognitive Retraining 
[x]               Therapeutic Exercises           [x]      Endurance Activities [x]               Balance Training                    [x]      Neuromuscular Re-Education []               Visual/Perceptual Training     [x]      Home Safety Training 
[x]               Patient Education                   [x]      Family Training/Education []               Other (comment): Frequency/Duration: Patient will be followed by occupational therapy 1-2 times per day/4-7 days per week to address goals. Discharge Recommendations: Home Health with 24/7 Supervision Further Equipment Recommendations for Discharge: tub transfer bench SUBJECTIVE:  
Patient stated I was running when I fell. I knew that was a bad idea.  OBJECTIVE DATA SUMMARY:  
 
Past Medical History:  
Diagnosis Date Ill-defined condition   
 mild memory loss with hallucinations History reviewed. No pertinent surgical history. Barriers to Learning/Limitations: yes;  altered mental status (i.e.Sedation, Confusion), pt occasionally confused Compensate with: visual, verbal, tactile, kinesthetic cues/model Home Situation:  
Home Situation Home Environment: Private residence # Steps to Enter: 4 Rails to Enter: Yes Hand Rails : Bilateral 
Living Alone: Yes Support Systems: Family member(s) Patient Expects to be Discharged to[de-identified] Private residence Current DME Used/Available at Home: Grab bars Tub or Shower Type: Tub/Shower combination 
[x]  Right hand dominant   []  Left hand dominant Cognitive/Behavioral Status: 
Neurologic State: Alert Orientation Level: Oriented to person;Oriented to situation;Oriented to time Cognition: Follows commands; Impulsive Safety/Judgement: Fall prevention Skin: appears intact on UEs, mult bruises and skin breakdown on BLE Edema: arnie noted Vision/Perceptual:   
   Acuity: Impaired near vision; Impaired far vision Corrective Lenses: Glasses(not available) Coordination: BUE Fine Motor Skills-Upper: Left Intact; Right Intact Gross Motor Skills-Upper: Left Intact; Right Intact Balance: 
Sitting: Intact Standing: Impaired; With support Standing - Static: Good Standing - Dynamic : Fair Strength: BUE Strength: Generally decreased, functional(Not tested RUE shoulder strength 2/2 pain and decreased ROM) Tone & Sensation: BUE Tone: Normal 
Sensation: Intact Range of Motion: BUE 
AROM: Generally decreased, functional(decreased RUE AROM) PROM: Within functional limits(Full PROM) Functional Mobility and Transfers for ADLs: 
Bed Mobility: 
Supine to Sit: Stand-by assistance Scooting: Stand-by assistance(to EOB) Transfers: 
Sit to Stand: Contact guard assistance;Stand-by assistance Stand to Sit: Contact guard assistance;Stand-by assistance ADL Assessment:  
Feeding: Modified independent Oral Facial Hygiene/Grooming: Supervision Bathing: Minimum assistance Upper Body Dressing: Minimum assistance Lower Body Dressing: Contact guard assistance Toileting: Minimum assistance ADL Intervention: 
Grooming Grooming Assistance: Supervision Position Performed: Seated in chair Washing Face: Supervision(proximal support of RUE) Washing Hands: Supervision Upper Body Dressing Assistance Shirt simulation with hospital gown: Minimum  assistance Lower Body Dressing Assistance Socks: Contact guard assistance Leg Crossed Method Used: No 
Position Performed: Long sitting on bed Cognitive Retraining Safety/Judgement: Fall prevention Pain: 
Pain level pre-treatment: 0/10 Pain level post-treatment: 0/10 Activity Tolerance:  
Fair Please refer to the flowsheet for vital signs taken during this treatment. After treatment:  
[x] Patient left in no apparent distress sitting up in chair 
[] Patient left in no apparent distress in bed 
[x] Call bell left within reach [x] Nursing notified 
[] Caregiver present [x] Chair alarm activated COMMUNICATION/EDUCATION:  
[x] Role of Occupational Therapy in the acute care setting 
[x] Home safety education was provided and the patient/caregiver indicated understanding. [x] Patient/family have participated as able in goal setting and plan of care. [x] Patient/family agree to work toward stated goals and plan of care. [] Patient understands intent and goals of therapy, but is neutral about his/her participation. [] Patient is unable to participate in goal setting and plan of care. Thank you for this referral. 
Arianna Aiken, OTR/L Time Calculation: 34 mins Eval Complexity: History: LOW Complexity : Brief history review ;  
 Examination: LOW Complexity : 1-3 performance deficits relating to physical, cognitive , or psychosocial skils that result in activity limitations and / or participation restrictions ; Decision Making:LOW Complexity : No comorbidities that affect functional and no verbal or physical assistance needed to complete eval tasks

## 2020-11-01 NOTE — PROGRESS NOTES
[facility] - Progress Note Admit Date: 10/29/2020 Assessment:  
 
-A.fib w/RVR, new diagnosis per patient 
-Cardiomyopathy, new, EF 40-45% by echo 10/31/20 
-Indeterminate troponin 0.05 max, not consistent with primary ACS, however, cannot exclude underlying CAD 
-New acute systolic heart failure, elevated pro-BNP of 5543 at presentation with edema by CXR 
-Confusion, memory loss, ongoing workup -s/p fall about 3 weeks ago 
-HTN, new diagnosis 
  
No primary cardiologist or primary physician Plan:  
 
I will switch to oral lasix, rates improved on oral lopressor, off diltiazem drip. I discussed pharm nuc in AM.  If no recurrent falls, will discuss NOAC for stroke prevention tomorrow. Add ACEI tomorrow if BP can tolerate. Subjective:  
 
Feeling better but confused. Objective:  
  
Patient Vitals for the past 8 hrs: 
 Temp Pulse Resp BP SpO2  
11/01/20 1113 97.8 °F (36.6 °C) 65 18 97/60 98 % 11/01/20 0844  92  (!) 143/74   
11/01/20 0839  86 27 (!) 143/74   
11/01/20 0735 97.7 °F (36.5 °C) 78 25 125/78 96 % 11/01/20 0400 98.7 °F (37.1 °C) 66 24 122/68 95 % Patient Vitals for the past 96 hrs: 
 Weight 11/01/20 0400 76.7 kg (169 lb) 10/31/20 1118 78 kg (172 lb) 10/31/20 0406 78.2 kg (172 lb 4.8 oz) 10/29/20 1428 72.6 kg (160 lb) Intake/Output Summary (Last 24 hours) at 11/1/2020 1130 Last data filed at 11/1/2020 1032 Gross per 24 hour Intake 360 ml Output 625 ml Net -265 ml Physical Exam: 
General:  alert, cooperative, no distress, appears stated age Neck:  nontender Lungs:  clear to auscultation bilaterally Heart:  irreg, S1, S2 normal, no murmur, click, rub or gallop Abdomen:  abdomen is soft without significant tenderness, masses, organomegaly or guarding Extremities:  extremities normal, atraumatic, no cyanosis or edema Data Review:  
 
Labs: Results:  
   
Chemistry Recent Labs 10/31/20 
0153 10/30/20 200 10/29/20 
1445 GLU 94 92 120*  140 137  
K 3.7 3.8 4.6  107 104 CO2 27 28 26 BUN 18 18 17 CREA 1.04 1.03 1.18  
CA 8.4* 8.0* 8.5 MG 2.4  --  2.3 PHOS 3.2  --   --   
AGAP 5 5 7 BUCR 17 17 14 AP  --  73 89 TP  --  6.4 6.9 ALB  --  3.1* 3.6 GLOB  --  3.3 3.3 AGRAT  --  0.9 1.1  
  
CBC w/Diff Recent Labs 10/31/20 
0153 10/30/20 
1115 10/29/20 
1445 WBC 6.6 5.8 6.5  
RBC 3.85* 3.97* 4.39* HGB 12.1* 12.6* 13.1 HCT 37.7 39.9 43.6  200 121* GRANS 60  --  64  
LYMPH 26  --  20* EOS 3  --  5 Cardiac Enzymes No results found for: CPK, CK, CKMMB, CKMB, RCK3, CKMBT, CKNDX, CKND1, MARTHA, TROPT, TROIQ, BOBBY, TROPT, TNIPOC, BNP, BNPP Coagulation Recent Labs 10/29/20 
1445 PTP 14.6 INR 1.2 APTT 23.1 Lipid Panel Lab Results Component Value Date/Time Cholesterol, total 103 10/31/2020 01:53 AM  
 HDL Cholesterol 34 (L) 10/31/2020 01:53 AM  
 LDL, calculated 56.8 10/31/2020 01:53 AM  
 VLDL, calculated 12.2 10/31/2020 01:53 AM  
 Triglyceride 61 10/31/2020 01:53 AM  
 CHOL/HDL Ratio 3.0 10/31/2020 01:53 AM  
  
BNP No results found for: BNP, BNPP, XBNPT Liver Enzymes Recent Labs 10/30/20 
1115 TP 6.4 ALB 3.1* AP 73 Digoxin Thyroid Studies Lab Results Component Value Date/Time TSH 2.45 10/29/2020 02:45 PM  
    
 
Signed By: Angelica Childers MD   
 November 1, 2020

## 2020-11-01 NOTE — PROGRESS NOTES
Problem: Mobility Impaired (Adult and Pediatric) Goal: *Acute Goals and Plan of Care (Insert Text) Description: Physical Therapy Goals Initiated 11/1/2020 and to be accomplished within 7 day(s) 1. Patient will move from supine to sit and sit to supine  in bed with modified independence. 2.  Patient will transfer from bed to chair and chair to bed with modified independence using the least restrictive device. 3.  Patient will perform sit to stand with modified independence. 4.  Patient will ambulate with modified independence for 200 feet with the least restrictive device. 5.  Patient will ascend/descend 4 stairs with  handrail(s) with modified independence. PLOF: Patient reports he was independent with self care and functional mobility. He lives alone in single story home with 4-5 MARGUERITE and B HR. Outcome: Progressing Towards Goal 
  
PHYSICAL THERAPY EVALUATION Patient: Ninfa Anderson (80 y.o. male) Date: 11/1/2020 Primary Diagnosis: Atrial fibrillation with rapid ventricular response ([address] [I48.91] Precautions:   Fall ASSESSMENT : 
Nurse cleared patient for PT. Patient agreeable to PT evaluation seen in conjunction with OT in order to maximize safety and functional mobility. Based on the objective data described below, the patient presents with decreased balance, dizziness, slightly impulsive, and impaired functional mobility. Patient performs supine to sit with stand by assistance, min verbal cues to remain seated for strength testing. Pt presents with good BLE strength grossly 4/5. Sit to stand with stand by assistance. He ambulates to room door and back with CG/SBA for safety as pt c/o increasing dizziness and unsteady on feet. His systolic BP in standing was 97, cued to take a seated rest break. Pt reports reduction of symptoms when sitting with improved BP. Second sit to stand trial, pt denies dizziness and systolic .  He ambulates 40 ft with standby/contact guard assistance and second person to manage IV pole. He c/o dizziness and experienced minor LOB though recovers with CG/minimal assistance. Educated patient not to get up on his own, he verbalizes understanding. Patient left resting in recliner with call bell within reach and active chair alarm. Patient will continue to benefit from skilled PT to maximize safety and independence with functional mobility. Patient will benefit from skilled intervention to address the above impairments. Patient's rehabilitation potential is considered to be Good Factors which may influence rehabilitation potential include:  
[]         None noted 
[x]         Mental ability/status [x]         Medical condition 
[x]         Home/family situation and support systems 
[x]         Safety awareness 
[]         Pain tolerance/management 
[]         Other: PLAN : 
Recommendations and Planned Interventions:  
[x]           Bed Mobility Training             [x]    Neuromuscular Re-Education 
[x]           Transfer Training                   []    Orthotic/Prosthetic Training 
[x]           Gait Training                          []    Modalities [x]           Therapeutic Exercises           []    Edema Management/Control 
[x]           Therapeutic Activities            []    Family Training/Education 
[x]           Patient Education 
[]           Other (comment): Frequency/Duration: Patient will be followed by physical therapy 1-2 times per day/4-7 days per week to address goals. Discharge Recommendations: Home Health with 24h supervision Further Equipment Recommendations for Discharge: RW  
 
SUBJECTIVE:  
Patient stated I fell and injured my right arm. Mahesh Stoddard 
 
OBJECTIVE DATA SUMMARY:  
 
Past Medical History:  
Diagnosis Date  Ill-defined condition   
 mild memory loss with hallucinations History reviewed. No pertinent surgical history. Barriers to Learning/Limitations: yes;  none Compensate with: Visual Cues, Verbal Cues, and Tactile Cues Home Situation: 
  
Critical Behavior: 
Neurologic State: Alert Orientation Level: Oriented to person;Oriented to time;Oriented to situation Cognition: Impulsive; Follows commands Safety/Judgement: Fall prevention Psychosocial 
Purposeful Interaction: Yes Pt Identified Daily Priority: Clinical issues (comment); Communication issues (comment) Caritas Process: Nurture loving kindness;Enable priscila/hope;Establish trust 
Caring Interventions: Reassure Reassure: Therapeutic listening; Informing Therapeutic Modalities: Intentional therapeutic touch Skin Integrity: Abrasion Skin Integumentary Skin Integrity: Abrasion Strength:   
Strength: Within functional limits Tone & Sensation:  
Tone: Normal 
 
Sensation: Intact Range Of Motion: 
AROM: Within functional limits Functional Mobility: 
Bed Mobility: 
  
Supine to Sit: Stand-by assistance Scooting: Stand-by assistance(to EOB) Transfers: 
Sit to Stand: Contact guard assistance;Stand-by assistance Stand to Sit: Contact guard assistance;Stand-by assistance Balance:  
Sitting: Intact Standing: Impaired; With support Standing - Static: Good Standing - Dynamic : Fair Ambulation/Gait Training: 
Distance (ft): 20 Feet (ft)(+ 40 ft) Assistive Device: (none) Ambulation - Level of Assistance: Contact guard assistance;Stand-by assistance Base of Support: Narrowed Pain: 
Pain level pre-treatment: -/10 patient reports pain in right UE with mobility Pain level post-treatment: -/10 Pain Intervention(s) : Medication (see MAR); Rest, Ice, Repositioning Response to intervention: Nurse notified, See doc flow Activity Tolerance:  
Fair: limited by dizziness Please refer to the flowsheet for vital signs taken during this treatment. After treatment:  
[x]         Patient left in no apparent distress sitting up in chair []         Patient left in no apparent distress in bed 
[x]         Call bell left within reach [x]         Nursing notified 
[]         Caregiver present [x]         chair alarm activated 
[]         SCDs applied COMMUNICATION/EDUCATION:  
[x]         Role of Physical Therapy in the acute care setting. [x]         Fall prevention education was provided and the patient/caregiver indicated understanding. [x]         Patient/family have participated as able in goal setting and plan of care. [x]         Patient/family agree to work toward stated goals and plan of care. []         Patient understands intent and goals of therapy, but is neutral about his/her participation. []         Patient is unable to participate in goal setting/plan of care: ongoing with therapy staff. 
[]         Other: Thank you for this referral. 
Regis Juan Daniel, PT Time Calculation: 34 mins Eval Complexity: History: LOW Complexity : Zero comorbidities / personal factors that will impact the outcome / POCExam:MEDIUM Complexity : 3 Standardized tests and measures addressing body structure, function, activity limitation and / or participation in recreation  Presentation: MEDIUM Complexity : Evolving with changing characteristics  Clinical Decision Making:Low Complexity    Overall Complexity:LOW

## 2020-11-01 NOTE — ROUTINE PROCESS
Pt had to be redirected and reminded numerous of times to stay in the bed due to his high fall risk and not to remove his cardiac leads and I.V. lines.

## 2020-11-02 PROBLEM — K92.2 ACUTE LOWER GASTROINTESTINAL HEMORRHAGE: Status: ACTIVE | Noted: 2020-01-01

## 2020-11-02 NOTE — PROGRESS NOTES
Problem: Falls - Risk of 
Goal: *Absence of Falls Description: Document Qiana Olsen Fall Risk and appropriate interventions in the flowsheet. Outcome: Progressing Towards Goal 
Note: Fall Risk Interventions: 
Mobility Interventions: Assess mobility with egress test 
 
Mentation Interventions: Adequate sleep, hydration, pain control Medication Interventions: Assess postural VS orthostatic hypotension Elimination Interventions: Call light in reach History of Falls Interventions: Bed/chair exit alarm Problem: Patient Education: Go to Patient Education Activity Goal: Patient/Family Education Outcome: Progressing Towards Goal 
  
Problem: Non-Violent Restraints Goal: *Removal from restraints as soon as assessed to be safe Outcome: Progressing Towards Goal 
Goal: *No harm/injury to patient while restraints in use Outcome: Progressing Towards Goal 
Goal: *Patient's dignity will be maintained Outcome: Progressing Towards Goal 
Goal: *Patient Specific Goal (EDIT GOAL, INSERT TEXT) Outcome: Progressing Towards Goal 
Goal: Non-violent Restaints:Standard Interventions Outcome: Progressing Towards Goal 
Goal: Non-violent Restraints:Patient Interventions Outcome: Progressing Towards Goal 
Goal: Patient/Family Education Outcome: Progressing Towards Goal 
  
Problem: Nutrition Deficit Goal: *Optimize nutritional status Outcome: Progressing Towards Goal 
  
Problem: Patient Education: Go to Patient Education Activity Goal: Patient/Family Education Outcome: Progressing Towards Goal 
  
Problem: Patient Education: Go to Patient Education Activity Goal: Patient/Family Education Outcome: Progressing Towards Goal 
  
Problem: Pressure Injury - Risk of 
Goal: *Prevention of pressure injury Description: Document Rafa Scale and appropriate interventions in the flowsheet. Outcome: Progressing Towards Goal 
Note: Pressure Injury Interventions: 
Sensory Interventions: Assess changes in LOC Moisture Interventions: Check for incontinence Q2 hours and as needed Activity Interventions: PT/OT evaluation, Increase time out of bed Mobility Interventions: HOB 30 degrees or less, PT/OT evaluation Nutrition Interventions: Document food/fluid/supplement intake Problem: Patient Education: Go to Patient Education Activity Goal: Patient/Family Education Outcome: Progressing Towards Goal

## 2020-11-02 NOTE — WOUND CARE
Physical Exam 
Musculoskeletal:  
     Arms: 
 
     Legs: 
 
 
Room 2310: Focused wound assess Discussed care with primary nurse Bertha LEIGH. Topical treatment orders per nursing unit skin care protocol using honey & silicone dressings. Will turn over care to nursing staff at this time.  
Brantley Seip BSN, RN, [facility] [facility] [place], [address]
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
## 2020-11-02 NOTE — PROGRESS NOTES
[facility] - Progress Note Admit Date: 10/29/2020 Assessment:  
 
-A.fib w/RVR, new diagnosis per patient 
-Cardiomyopathy, new, EF 40-45% by echo 10/31/20, patient unable to tolerate pharm nuc due to claustrophobic  
-Indeterminate troponin 0.05 max, not consistent with primary ACS  
-New acute systolic heart failure, elevated pro-BNP of 5543 at presentation with edema by CXR 
-Confusion, memory loss, ongoing workup -s/p fall about 3 weeks ago 
-HTN, new diagnosis 
  
No primary cardiologist or primary physician Plan:  
 
Lopressor increased to 50 mg BID this morning, monitor, start ACEI/ARB if BP remains elevated. Switched to chronic lasix. Patient unable to tolerate stress test due to claustrophobia. I would not proceed to cath at this time given risk/benefit. High suspicion for tachycardia induced and will treat medically and follow-up echo in a couple months. Consider starting NOAC for stroke prevention if patient remains compliant. No further cardiac testing planned. Subjective: No new complaints. Objective:  
  
Patient Vitals for the past 8 hrs: 
 Temp Pulse Resp BP SpO2  
11/02/20 0752 98.4 °F (36.9 °C) (!) 110 29 (!) 130/94   
11/02/20 0715  (!) 121  (!) 141/102   
11/02/20 0342 97.5 °F (36.4 °C) 100 28  100 % Patient Vitals for the past 96 hrs: 
 Weight  
11/02/20 0818 74.4 kg (164 lb) 11/02/20 0342 74.4 kg (164 lb 1.6 oz) 11/01/20 0400 76.7 kg (169 lb) 10/31/20 1118 78 kg (172 lb) 10/31/20 0406 78.2 kg (172 lb 4.8 oz) 10/29/20 1428 72.6 kg (160 lb) Intake/Output Summary (Last 24 hours) at 11/2/2020 1028 Last data filed at 11/2/2020 0520 Gross per 24 hour Intake 360 ml Output 250 ml Net 110 ml Physical Exam: 
General:  alert, cooperative, no distress, appears stated age Neck:  nontender Lungs:  clear to auscultation bilaterally Heart:  irreg, S1, S2 normal, no murmur, click, rub or gallop Abdomen:  abdomen is soft without significant tenderness, masses, organomegaly or guarding Extremities:  extremities normal, atraumatic, no cyanosis or edema Data Review:  
 
Labs: Results:  
   
Chemistry Recent Labs 10/31/20 
0153 GLU 94   
K 3.7  CO2 27 BUN 18 CREA 1.04  
CA 8.4* MG 2.4 PHOS 3.2 AGAP 5  
BUCR 17 CBC w/Diff Recent Labs 10/31/20 
0153 WBC 6.6  
RBC 3.85* HGB 12.1*  
HCT 37.7  GRANS 60  
LYMPH 26 EOS 3 Cardiac Enzymes No results found for: CPK, CK, CKMMB, CKMB, RCK3, CKMBT, CKNDX, CKND1, MARTHA, TROPT, TROIQ, BOBBY, TROPT, TNIPOC, BNP, BNPP Coagulation No results for input(s): PTP, INR, APTT, INREXT in the last 72 hours. Lipid Panel Lab Results Component Value Date/Time Cholesterol, total 103 10/31/2020 01:53 AM  
 HDL Cholesterol 34 (L) 10/31/2020 01:53 AM  
 LDL, calculated 56.8 10/31/2020 01:53 AM  
 VLDL, calculated 12.2 10/31/2020 01:53 AM  
 Triglyceride 61 10/31/2020 01:53 AM  
 CHOL/HDL Ratio 3.0 10/31/2020 01:53 AM  
  
BNP No results found for: BNP, BNPP, XBNPT Liver Enzymes No results for input(s): TP, ALB, TBIL, AP in the last 72 hours. No lab exists for component: SGOT, GPT, DBIL Digoxin Thyroid Studies Lab Results Component Value Date/Time TSH 2.45 10/29/2020 02:45 PM  
    
 
Signed By: Anders Saha MD   
 November 2, 2020

## 2020-11-02 NOTE — PROGRESS NOTES
[facility] Hospitalist Group Progress Note Patient: Cristhian Dillon Age: 79 y.o. : 1940 MR#: [id number] SSN: [id number] Date/Time: 2020 Subjective:  
 
Patient seen and evaluated, is confused, currently in soft restraints, is trying to climb out of bed and has been pulling at his IV lines including his condom catheter. Assessment/Plan: 1. Atrial fibrillation with RVR - TSH wnl . The patient states he used to have afib back when he was \"smoking and doing stuff\" and was not on Anticoagulation at the time, this was many yrs ago per patient. 2. Lower GI bleed 3. Acute Encephalopathy 4. Elevated probnp - no evidence of volume overload on chest xray nor physical exam.  
5. Fall at home, unclear if mechanical or not. 6. Right UE pain secondary to fall at home- xray right humerus no acute findings. 7. Encephalopathy, unclear if this is acute or chronic, toxic or metabolic - UA no infection, chest xray suspicious for increased interstitial lung markings, ammonia level 41 , CT head negative for acute process. 8. Reports of hallucinations - visual 
9. Reports of memory loss 10. Has not received medical care in 15 yrs 11. Hx of recreational drug use per pt. 12. Hx of tobacco abuse  
  
-Lovenox stoppedGI consulted - Stopped IV cardizem gtt ECHO done,  Reviewed - shows LVEF 40-45%  
cardiology consulted per cardiology note, Continue Lopressor Continue oral lasix  
-Lower GI bleedGI consulted, hold anticoagulation, IV Protonix, monitor H&H 
Acute encephalopathy? Alcohol abuseplaced on CIWA protocol Nuc stress test in AMunable to do nuclear stress test secondary to claustrophobia, we will not proceed with cardiac cath per cardiology. No further cardiac testing at this time We will hold NOAC at this time Add aspirin for underlying coronary artery disease.  
- nutrition consult.   
PT and OT eval noted 
- needs to establish care with PCP  
 - please call the family in the morning to obtain more information about history. I called and spoke with daughter Donna Almeida who mentions that the patient has been acting weird for the last few days. She also mentions that he recently had a fall at her sister's wedding. She denied any history of alcohol useI explained to her that patient's alcohol levels were elevated when he presented to the emergency room. Case discussed with:  [x]Patient  []Family  [x]Nursing  []Case Management DVT Prophylaxis:  [x]Lovenox  []Hep SQ  []SCDs  []Coumadin   []On Heparin gtt Objective:  
VS:  
Visit Vitals BP (!) 150/77 (BP 1 Location: Left arm, BP Patient Position: At rest) Pulse (!) 102 Temp 97.8 °F (36.6 °C) Resp 27 Ht 5' 9\" (1.753 m) Wt 74.4 kg (164 lb) SpO2 98% BMI 24.22 kg/m² Tmax/24hrs: Temp (24hrs), Av.1 °F (36.7 °C), Min:97.3 °F (36.3 °C), Max:98.6 °F (37 °C) IOBRIEF Intake/Output Summary (Last 24 hours) at 2020 1542 Last data filed at 2020 1879 Gross per 24 hour Intake 240 ml Output 125 ml Net 115 ml General: Confused HEENT: PERRLA, anicteric sclerae. Pulmonary:  CTA Bilaterally. No Wheezing/Rhonchi/Rales. Cardiovascular: Regular rate and Rhythm. GI:  Soft, Non distended, Non tender. + Bowel sounds. Extremities:  No edema, cyanosis, clubbing. No calf tenderness. Neurologic: Confused, trying to climb out of bed Additional: 
 
Medications:  
Current Facility-Administered Medications Medication Dose Route Frequency  metoprolol tartrate (LOPRESSOR) tablet 50 mg  50 mg Oral Q12H  
 metoprolol (LOPRESSOR) injection 5 mg  5 mg IntraVENous Q4H PRN  
 sodium chloride (NS) flush 5-40 mL  5-40 mL IntraVENous Q8H  
 sodium chloride (NS) flush 5-40 mL  5-40 mL IntraVENous PRN  
 LORazepam (ATIVAN) tablet 1 mg  1 mg Oral Q1H PRN  Or  
 LORazepam (ATIVAN) injection 1 mg  1 mg IntraVENous Q1H PRN  
 LORazepam (ATIVAN) tablet 2 mg  2 mg Oral Q1H PRN  
 Or  LORazepam (ATIVAN) injection 2 mg  2 mg IntraVENous Q1H PRN  
 LORazepam (ATIVAN) injection 3 mg  3 mg IntraVENous Q15MIN PRN  pantoprazole (PROTONIX) 40 mg in 0.9% sodium chloride 10 mL injection  40 mg IntraVENous Q12H  furosemide (LASIX) tablet 20 mg  20 mg Oral DAILY  hydrALAZINE (APRESOLINE) 20 mg/mL injection 10 mg  10 mg IntraVENous Q6H PRN  
 aspirin chewable tablet 81 mg  81 mg Oral DAILY  therapeutic multivitamin (THERAGRAN) tablet 1 Tab  1 Tab Oral DAILY  thiamine HCL (B-1) tablet 100 mg  100 mg Oral DAILY  folic acid (FOLVITE) tablet 1 mg  1 mg Oral DAILY  sodium chloride (NS) flush 5-40 mL  5-40 mL IntraVENous Q8H  
 sodium chloride (NS) flush 5-40 mL  5-40 mL IntraVENous PRN  
 acetaminophen (TYLENOL) tablet 650 mg  650 mg Oral Q6H PRN Or  
 acetaminophen (TYLENOL) suppository 650 mg  650 mg Rectal Q6H PRN  polyethylene glycol (MIRALAX) packet 17 g  17 g Oral DAILY PRN  promethazine (PHENERGAN) tablet 12.5 mg  12.5 mg Oral Q6H PRN Or  
 ondansetron (ZOFRAN) injection 4 mg  4 mg IntraVENous Q6H PRN Imaging: XR Results (most recent): 
Results from Hospital Encounter encounter on 10/29/20 XR HUMERUS RT Narrative XR HUMERUS RT: 10/29/2020 4:00 PM 
 
CLINICAL INFORMATION Pain. Post fall. COMPARISON None. TECHNIQUE 
2 views of the left humerus FINDINGS No fracture or destructive osseous lesion. High riding humeral head suggestive of chronic rotator cuff insufficiency. Increased interstitial lung markings of the imaged right lung. Impression IMPRESSION: 
1. No acute osseous findings. CT Results (most recent): 
Results from Hospital Encounter encounter on 10/29/20 CT HEAD WO CONT Narrative CT OF THE BRAIN 
 
COMPARISON: None. INDICATIONS: Fall and memory loss. TECHNIQUE: Volumetric data acquisition was performed   through the brain on a 
multislice scanner and reconstructed in the axial plane.  
 
FINDINGS:   
 
 The ventricles and CSF spaces are enlarged consistent with age associated 
atrophy. There is no midline shift. Osie Ra The brain parenchyma is of generally normal attenuation. There is decreased 
attenuation in the periventricular white matter consistent with presumed 
microvascular disease. There is no hemorrhage evident. There are no pathologic fluid collections. There are no pathologic calcifications. . 
. The visible portions of the paranasal sinuses: Are clear. Impression IMPRESSION:  
 
Atrophy and microvascular disease. No acute intracranial process. All CT scans at this facility are performed using dose optimization technique as 
appropriate to the performed exam, to include automated exposure control, 
adjustment of the mA and/or kV according to patient's size (Including 
appropriate matching for site-specific examinations), or use of iterative 
reconstruction technique. 10/29/20 ECHO ADULT COMPLETE 10/31/2020 10/31/2020 Narrative · LV: Estimated LVEF is 40 - 45%. Visually measured ejection fraction. Normal cavity size. Mild concentric hypertrophy. · RV: Mildly dilated right ventricle. Mildly reduced systolic function. · PA: Mild pulmonary hypertension. Pulmonary arterial systolic pressure is 46 mmHg. · IVC: Moderately elevated central venous pressure (10-15 mmHg); IVC  
diameter is larger than 21 mm and collapses more than 50% with  
respiration. Signed by: Sally Domigno MD  
  
 
Labs:   
Recent Results (from the past 50 hour(s)) CBC WITH AUTOMATED DIFF Collection Time: 11/02/20 11:10 AM  
Result Value Ref Range WBC 7.3 4.6 - 13.2 K/uL  
 RBC 3.72 (L) 4.70 - 5.50 M/uL  
 HGB 11.8 (L) 13.0 - 16.0 g/dL HCT 36.7 36.0 - 48.0 % MCV 98.7 (H) 74.0 - 97.0 FL  
 MCH 31.7 24.0 - 34.0 PG  
 MCHC 32.2 31.0 - 37.0 g/dL  
 RDW 13.1 11.6 - 14.5 % PLATELET 688 364 - 967 K/uL MPV 11.8 9.2 - 11.8 FL  
 NEUTROPHILS 73 40 - 73 % LYMPHOCYTES 18 (L) 21 - 52 % MONOCYTES 8 3 - 10 % EOSINOPHILS 0 0 - 5 % BASOPHILS 1 0 - 2 %  
 ABS. NEUTROPHILS 5.4 1.8 - 8.0 K/UL  
 ABS. LYMPHOCYTES 1.3 0.9 - 3.6 K/UL  
 ABS. MONOCYTES 0.6 0.05 - 1.2 K/UL  
 ABS. EOSINOPHILS 0.0 0.0 - 0.4 K/UL  
 ABS. BASOPHILS 0.0 0.0 - 0.1 K/UL  
 DF AUTOMATED METABOLIC PANEL, COMPREHENSIVE Collection Time: 11/02/20 11:10 AM  
Result Value Ref Range Sodium 143 136 - 145 mmol/L Potassium 4.5 3.5 - 5.5 mmol/L Chloride 109 100 - 111 mmol/L  
 CO2 29 21 - 32 mmol/L Anion gap 5 3.0 - 18 mmol/L Glucose 115 (H) 74 - 99 mg/dL BUN 42 (H) 7.0 - 18 MG/DL Creatinine 1.07 0.6 - 1.3 MG/DL  
 BUN/Creatinine ratio 39 (H) 12 - 20 GFR est AA >60 >60 ml/min/1.73m2 GFR est non-AA >60 >60 ml/min/1.73m2 Calcium 8.4 (L) 8.5 - 10.1 MG/DL Bilirubin, total 0.8 0.2 - 1.0 MG/DL  
 ALT (SGPT) 13 (L) 16 - 61 U/L  
 AST (SGOT) 11 10 - 38 U/L Alk. phosphatase 65 45 - 117 U/L Protein, total 6.1 (L) 6.4 - 8.2 g/dL Albumin 3.0 (L) 3.4 - 5.0 g/dL Globulin 3.1 2.0 - 4.0 g/dL A-G Ratio 1.0 0.8 - 1.7 Signed By: Aminta Lindsey MD   
 November 2, 2020 I spent 35 minutes with the patient in face-to-face consultation, of which greater than 50% was spent in counseling and coordination of care as described above Disclaimer: Sections of this note are dictated using utilizing voice recognition software. Minor typographical errors may be present. If questions arise, please do not hesitate to contact me or call our department.

## 2020-11-02 NOTE — PROGRESS NOTES
Problem: Self Care Deficits Care Plan (Adult) Goal: *Acute Goals and Plan of Care (Insert Text) Description: Occupational Therapy Goals Initiated 11/1/2020 within 7 day(s). 1.  Patient will perform grooming standing at the sink with supervision/set-up for 5 minutes. 2.  Patient will perform upper body dressing with supervision/set-up utilizing compensatory strategy. 3.  Patient will perform lower body dressing with supervision/set-up utilizing energy conservation techniques and/or AE as needed. 4.  Patient will perform toilet transfers with Supervision. 5.  Patient will perform all aspects of toileting with supervision/set-up. 6.  Patient will utilize energy conservation techniques during functional activities with verbal cues. Prior Level of Function: Pt reports living alone in private residence with family nearby. Pt reports being previously independent with I/ADLs, including driving. Pt has no DME available at home. Outcome: Progressing Towards Goal 
 OCCUPATIONAL THERAPY TREATMENT Patient: Amilcar Nino (81 y.o. male) Date: 11/2/2020 Diagnosis: Atrial fibrillation with rapid ventricular response ([address] [I48.91] Atrial fibrillation with rapid ventricular response ([address] Precautions: Fall Chart, occupational therapy assessment, plan of care, and goals were reviewed. ASSESSMENT: 
Pt is pleasantly confused (oriented to self only), agreeable to OOB activity. Pt's impulsivity and poor safety awareness requires max verbal/tactile cues for safety w/functional mobility/transfers. Pt performs toileting ADL w/CGA and increase time and vc's for thoroughness w/bowel hygiene. Pt tolerates standing sinkside ~ 10 minutes performing ADL. Pt presents w/agnosia performing oral care, requiring vc's for sequencing and ADL item retrieval for identification mouthwash/toothpaste.  Reviewed compensatory strategy w/UB dressing 2/2 c/o RUE pain donning hospital gown. Pt left in chair and reinforced importance of calling for assistance. Pt would do well w/ARU at d/c 2/2 pt lives alone and has good participation. Progression toward goals: 
[x]          Improving appropriately and progressing toward goals 
[]          Improving slowly and progressing toward goals 
[]          Not making progress toward goals and plan of care will be adjusted PLAN: 
Patient continues to benefit from skilled intervention to address the above impairments. Continue treatment per established plan of care. Discharge Recommendations:  Inpatient Rehab Further Equipment Recommendations for Discharge:  possibly shower chair and rolling walker pending progress SUBJECTIVE:  
Patient stated Did this use to be a insurance place? Amadou Scott OBJECTIVE DATA SUMMARY:  
Cognitive/Behavioral Status: 
Neurologic State: Alert, Confused Orientation Level: Oriented to person Cognition: Follows commands, Poor safety awareness Safety/Judgement: Fall prevention Functional Mobility and Transfers for ADLs: 
 Bed Mobility: 
Supine to Sit: Stand-by assistance Scooting: Supervision Transfers: 
Sit to Stand: Stand-by assistance Bed to Chair: Contact guard assistance(w/HHA) Bathroom Mobility: Contact guard assistance(w/RW) Balance: 
Sitting: Intact Standing: Impaired; Without support Standing - Static: Fair(fair plus) Standing - Dynamic : Fair ADL Intervention: 
Grooming Position Performed: Standing Washing Face: Stand-by assistance Washing Hands: Stand-by assistance Brushing Teeth: Stand-by assistance Upper Body Bathing Bathing Assistance: Stand-by assistance Position Performed: Standing Lower Body Bathing Perineal  : Stand-by assistance Position Performed: Standing Upper Body Dressing Assistance Hospital Gown: Minimum  assistance; Compensatory technique training Toileting Toileting Assistance: Contact guard assistance Bowel Hygiene: Contact guard assistance Clothing Management: Contact guard assistance Pain: 
Pain level pre-treatment: 0/10 Pain level post-treatment: 0/10 Pt c/o RUE pain w/movement, although pain not rated. Activity Tolerance:   
Good Please refer to the flowsheet for vital signs taken during this treatment. After treatment:  
[]  Patient left in no apparent distress sitting up in chair 
[x]  Patient left in no apparent distress in bed 
[x]  Call bell left within reach [x]  Nursing notified 
[]  Caregiver present [x]  chair alarm activated COMMUNICATION/EDUCATION:  
[] Role of Occupational Therapy in the acute care setting 
[] Home safety education was provided and the patient/caregiver indicated understanding. [] Patient/family have participated as able in working towards goals and plan of care. [x] Patient/family agree to work toward stated goals and plan of care. [] Patient understands intent and goals of therapy, but is neutral about his/her participation. [] Patient is unable to participate in goal setting and plan of care. Thank you for this referral. 
TAMI De Oliveira Time Calculation: 40 mins

## 2020-11-02 NOTE — CONSULTS
GI Attending note: 
-Pt seen, examined and evaluated independently. Findings as outlined by NP confirmed. Agree with assessment and plan. Impression: 
GI bleed-subsided, Hgb stable Plan: W/u in progress 
  
WWW. "[employer] 
[phone] GASTROENTEROLOGY CONSULT Impression: 1. GI bleed - nursing reports dark stools followed by red blood mixed in stools today, started on lovenox 11/1/2020, had 3 doses prior to onset of bleeding. Stool for occult blood pending. 2. Anemia - mild, h/h 11.8/36.7 down from 12.1/37.7 3. A-fib with RVR - lovenox started 11/1/2020 4. Cardiomyopathy - EF 40-45% by echo 10/31/2020 5. New acute systolic HF 6. Encephalopathy - ? Acute vs chronic, toxic or metabolic, ammonia 41, serum ETOH 9 on admission 10/30/2020 7. Hx hallucinations, memory loss 8. Hx recreational drug use Plan: 1. Hold anticoagulation 2. Start IV pantoprazole 3. Monitor h/h, if h/h continues to drop could consider scopes vs bleed scan with IR embolization, transfuse per protocol 4. Medical management per primary team 
 
 
Chief Complaint: GI bleed HPI: 
Lizzie Cockayne is a 79 y.o. male who I am being asked to see in consultation for an opinion regarding the above. He presented to the ED 10/30/2020 with increasing confusion, memory loss, hallucinations and falls. He has a history of HTN but has avoided medical care for at least the past 15 years. On this admissions has been found to have A-fib with RVR, cardiomyopathy, acute systolic HF and encephalopathy. History is limited, he has been agitated and receiving ativan, currently restrained. Hospitalist has call in to his daughter to try to obtain more information about his medical history. There are no endoscopic records with [facility]. PMH:  
Past Medical History:  
Diagnosis Date  Ill-defined condition   
 mild memory loss with hallucinations PSH:  
History reviewed. No pertinent surgical history.  
 
Social HX:  
 Social History Socioeconomic History  Marital status: LEGALLY  Spouse name: Not on file  Number of children: Not on file  Years of education: Not on file  Highest education level: Not on file Occupational History  Not on file Social Needs  Financial resource strain: Not on file  Food insecurity Worry: Not on file Inability: Not on file  Transportation needs Medical: Not on file Non-medical: Not on file Tobacco Use  Smoking status: Not on file Substance and Sexual Activity  Alcohol use: Not on file  Drug use: Not on file  Sexual activity: Not on file Lifestyle  Physical activity Days per week: Not on file Minutes per session: Not on file  Stress: Not on file Relationships  Social connections Talks on phone: Not on file Gets together: Not on file Attends Oriental orthodox service: Not on file Active member of club or organization: Not on file Attends meetings of clubs or organizations: Not on file Relationship status: Not on file  Intimate partner violence Fear of current or ex partner: Not on file Emotionally abused: Not on file Physically abused: Not on file Forced sexual activity: Not on file Other Topics Concern  Not on file Social History Narrative  Not on file FHX:  
History reviewed. No pertinent family history. Allergy: No Known Allergies Patient Active Problem List  
Diagnosis Code  Atrial fibrillation with rapid ventricular response (HCC) I48.91 Home Medications: No medications prior to admission. Review of Systems: Unable to obtain due to patient factors Constitutional   
Skin: No rashes, pruritis, jaundice, ulcerations, erythema. HENT: No headaches, nosebleeds, sinus pressure, rhinorrhea, sore throat. Eyes: No visual changes, blurred vision, eye pain, photophobia, jaundice. Cardiovascular: No chest pain, heart palpitations. Respiratory: No cough, SOB, wheezing, chest discomfort, orthopnea. Gastrointestinal:   
Genitourinary: No dysuria, bleeding, discharge, pyuria. Musculoskeletal: No weakness, arthralgias, wasting. Endo: No sweats. Heme: No bruising, easy bleeding. Allergies: As noted. Neurological: Cranial nerves intact. Alert and oriented. Gait not assessed. Psychiatric:  No anxiety, depression, hallucinations. Visit Vitals /83 (BP 1 Location: Left arm, BP Patient Position: At rest) Pulse (!) 103 Temp 98.4 °F (36.9 °C) Resp 17 Ht 5' 9\" (1.753 m) Wt 74.4 kg (164 lb) SpO2 98% BMI 24.22 kg/m² Physical Assessment:  
 
constitutional: appearance: well developed, well nourished, normal habitus, no deformities, in no acute distress. skin: inspection: no rashes, ulcers, icterus or other lesions; no clubbing or telangiectasias. palpation: no induration or subcutaneos nodules. eyes: inspection: normal conjunctivae and lids; no jaundice pupils: normal 
ENMT: mouth: normal oral mucosa,lips and gums; good dentition. oropharynx: normal tongue, hard and soft palate; posterior pharynx without erithema, exudate or lesions. neck: thyroid: normal size, consistency and position; no masses or tenderness. respiratory: effort: normal chest excursion; no intercostal retraction or accessory muscle use. cardiovascular: abdominal aorta: normal size and position; no bruits. palpation: PMI of normal size and position; normal rhythm; no thrill or murmurs. abdominal: abdomen: normal consistency; no tenderness or masses. hernias: no hernias appreciated. liver: normal size and consistency. spleen: not palpable. rectal: hemoccult/guaiac: not performed. musculoskeletal: grossly normal, gait not assessed, sleeping in bed 
neurologic: cranial nerves: II-XII grossly normal. Pupils intact. Psychiatric: Impaired, nursing reports hallucinations, memory loss, and significant agitation. Basic Metabolic Profile Recent Labs 11/02/20 
1110 10/31/20 
0153  142  
K 4.5 3.7  110 CO2 29 27 BUN 42* 18  
* 94  
CA 8.4* 8.4* MG  --  2.4 PHOS  --  3.2 CBC w/Diff Recent Labs 11/02/20 
1110 WBC 7.3  
RBC 3.72* HGB 11.8* HCT 36.7 MCV 98.7*  
MCH 31.7 MCHC 32.2 RDW 13.1  Recent Labs 11/02/20 
1110 GRANS 73 LYMPH 18* EOS 0 Hepatic Function Recent Labs 11/02/20 
1110 ALB 3.0*  
TP 6.1* TBILI 0.8 AP 65 Coags No results for input(s): PTP, INR, APTT, INREXT in the last 72 hours. [place] [employer]. [facility] [place] 1946, [address] [facility] Cell: [phone] [email or web address] [email or web address]

## 2020-11-02 NOTE — ADT AUTH CERT NOTES
Atrial Fibrillation - Care Day 5 (11/2/2020) by Kacy Gutierrez RN  
 
   
Review Status  Review Entered Completed  11/2/2020 11:41   
   
Criteria Review Care Day: 5 Care Date: 11/2/2020 Level of Care: Step Down Guideline Day 2 Level Of Care (X) ICU, intermediate care, or telemetry to discharge 11/2/2020 11:41:12 EST by Sujit Hollis Clinical Status ( ) * Hemodynamic stability 11/2/2020 11:41:12 EST by Maureen Alcazar   
  repeat tachycardia VS  T 98.4   P 60 - 122   B/P 194/113 , 141/102   
( ) * Sinus rhythm or acceptable ventricular rate ( ) * No evidence of myocardial ischemia   
( ) * Mental status at baseline ( ) * Tachypnea absent 11/2/2020 11:41:12 EST by Maureen Alcazar   
  R 16 - 29   frequent tachypnea noted ( ) * Hypoxemia absent 11/2/2020 11:41:12 EST by Maureen Alcazar   
  SpO2 91 - 100 %   on 2L NC   
( ) * Anticoagulants regimen for next level of care established 11/2/2020 11:41:12 EST by Durga Negron Consider starting NOAC for stroke prevention if patient remains compliant   
( ) * Discharge plans and education understood Activity ( ) * Ambulatory or acceptable for next level of care 11/2/2020 11:41:12 EST by Maureen Alcazar   
  Activity as tolerated w/assist, He ambulates 40 ft with standby/contact guard assistance and second person to manage IV pole. He c/o dizziness and experienced minor LOB though recovers with CG/minimal assistance. Educated patient not to get up on hi   
Routes   
(X) * Oral hydration, medications, and diet 11/2/2020 11:41:12 EST by Maureen Alczaar   
  Cardiac diet>NPO pMN,> Nuclear Stress test   
Interventions (X) Cardiac monitoring 11/2/2020 11:41:12 EST by Sujit Hollis Medications (X) Anticoagulants [F]   
11/2/2020 11:41:12 EST by Maureen Alcazar   
  Lovenox 70 mg sq q12h > 10/31   
(X) Possible rate and rhythm control medications 11/2/2020 11:41:12 EST by Arnie Vargas   
  Metoprolol 25 mg po q12h >50 mg po q12h 11/02 Cardizem IV @ 10 mg/h; stopped 11/01 * Milestone Additional Notes 11/02/20  No attending note yet today 10/31 & 11/01 included; also Cardiology thru 11/02 IM Note 10/31 Atrial fibrillation with RVR - TSH wnl . The patient states he used to have afib back when he was \"smoking and doing stuff\" and was not on Anticoagulation at the time, this was many yrs ago per patient.    
Elevated probnp - no evidence of volume overload on chest xray nor physical exam.    
Fall at home, unclear if mechanical or not.     
Right UE pain secondary to fall at home- xray right humerus no acute findings. Encephalopathy, unclear if this is acute or chronic, toxic or metabolic - UA no infection, chest xray suspicious for increased interstitial lung markings, ammonia level 41 , CT head negative for acute process. Reports of hallucinations - visual   
Reports of memory loss   Has not received medical care in 15 yrs    
Hx of recreational drug use per pt. Hx of tobacco abuse    
    
- continue lovenox therapeutic dose of 70 mg IV BID. continue diltiazem drip. ECHO done, cardiology consulted per cardiology note  . Lopressor orally and try and wean down Cardizem drip, Lasix x1 today.  Add aspirin for underlying coronary artery disease - check a1c and lipid panel. Check b12.    
- check UDS, etoh level is 9. The patient denies etoh intake. - wound care consult for ulcer on the right shin     
- needs PT OT fall precautions. Needs bell alarms. Soft wrist restraint for now.  Frequent re-orientation. Sitter at [employer]   
- SLP precautions. Aspiration precautions.    
- nutrition consult.    
- needs to establish care with PCP    
- please call the family in the morning to obtain more information about history IM Note 11/01 Atrial fibrillation with RVR - TSH wnl .  The patient states he used to have afib back when he was \"smoking and doing stuff\" and was not on Anticoagulation at the time, this was many yrs ago per patient.    
Elevated probnp - no evidence of volume overload on chest xray nor physical exam.    
Fall at home, unclear if mechanical or not.     
Right UE pain secondary to fall at home- xray right humerus no acute findings. Encephalopathy, unclear if this is acute or chronic, toxic or metabolic - UA no infection, chest xray suspicious for increased interstitial lung markings, ammonia level 41 , CT head negative for acute process. Reports of hallucinations - visual   
Reports of memory loss   Has not received medical care in 15 yrs    
Hx of recreational drug use per pt. Hx of tobacco abuse    
    
- continue lovenox therapeutic dose of 80 mg IV BID.   
- Stopped IV cardizem gtt   
 ECHO done,  Reviewed - shows LVEF 40-45%   
cardiology consulted per cardiology note, Continue Lopressor Continue oral lasix Nuc stress test in AM   
NOAC discuss in AM   
 Add aspirin for underlying coronary artery disease.    
- nutrition consult.    
- needs to establish care with PCP    
- please call the family in the morning to obtain more information about history. Cardiology Consult 10/31 Patient has new diagnosis of A.fib/RVR and cardiomyopathy with EF 45% and CHF.   
    
Plan to control rate by starting lopressor orally today and wean down diltiazem drip as needed.  Can add ACEI tomorrow.  Lasix x 1 today.   
    
Poor candidate for [address] if falls continue.  Currently on therapeutic lovenox.  Add ASA in case underlying CAD. Cardiology Note 11/01   
    
I will switch to oral lasix, rates improved on oral lopressor, off diltiazem drip.     
    
I discussed pharm nuc in AM.  If no recurrent falls, will discuss NOAC for stroke prevention tomorrow.   
    
Add ACEI tomorrow if BP can tolerate. Cardiology Note 11/02 Lopressor increased to 50 mg BID this morning, monitor, start ACEI/ARB if BP remains elevated.  Switched to chronic lasix.   
    
Patient unable to tolerate stress test due to claustrophobia.  I would not proceed to cath at this time given risk/benefit.  High suspicion for tachycardia induced and will treat medically and follow-up echo in a couple months.   
    
Consider starting NOAC for stroke prevention if patient remains compliant.  No further cardiac testing planned.   
    
  
PT 11/01 He ambulates 40 ft with standby/contact guard assistance and second person to manage IV pole. He c/o dizziness and experienced minor LOB though recovers with CG/minimal assistance. Educated patient not to get up on his own, he verbalizes understanding. Patient left resting in recliner with call bell within reach and active chair alarm. Patient will continue to benefit from skilled PT to maximize safety and independence with functional mobility. Results:     
Hgb 12.1 ECHO ADULT COMPLETE 10/31/20 LV: Estimated LVEF is 40 - 45%. Visually measured ejection fraction. Normal cavity size. Mild concentric hypertrophy. RV: Mildly dilated right ventricle. Mildly reduced systolic function. PA: Mild pulmonary hypertension. Pulmonary arterial systolic pressure is 46 mmHg. IVC: Moderately elevated central venous pressure (10-15 mmHg); IVC diameter is larger than 21 mm and collapses more than 50% with respiration. Medications:   
Lasix 20 mg IV daily > 20 mg po daily 11/02 Aspirin 81 mg po daily 11/01 KCl 10 mEq IV once 11/01 Hydralazine 10 mg IV once 10/31

## 2020-11-02 NOTE — PROGRESS NOTES
Patient was given 10.87 milliCuries of 99mTc-Sestamibi for the resting images. But unable to obtain any images. Images not acquired, patient was uncooperative with laying still. States he is very claustrophobic and confused and does not follow and instruction given to hold still. Per BERLIN Mann with cardiology, cancel nuclear stress test until patient is more cooperative. Patient's armband was left on and sent back to room.

## 2020-11-02 NOTE — PROGRESS NOTES
Problem: Mobility Impaired (Adult and Pediatric) Goal: *Acute Goals and Plan of Care (Insert Text) Description: Physical Therapy Goals Initiated 11/1/2020 and to be accomplished within 7 day(s) 1. Patient will move from supine to sit and sit to supine  in bed with modified independence. 2.  Patient will transfer from bed to chair and chair to bed with modified independence using the least restrictive device. 3.  Patient will perform sit to stand with modified independence. 4.  Patient will ambulate with modified independence for 200 feet with the least restrictive device. 5.  Patient will ascend/descend 4 stairs with  handrail(s) with modified independence. PLOF: Patient reports he was independent with self care and functional mobility. He lives alone in single story home with 4-5 MARGUERITE and B HR. Outcome: Progressing Towards Goal 
  
PHYSICAL THERAPY TREATMENT Patient: Shannan Alcazar (85 y.o. male) Date: 11/2/2020 Diagnosis: Atrial fibrillation with rapid ventricular response ([address] [I48.91] Atrial fibrillation with rapid ventricular response ([address] Precautions: Fall PLOF: see above ASSESSMENT: 
Pt received in bed in [facility], cleared by nursing to participate, seen with OT to maximize functional mobility and safety. Pt was able to come to sitting EOB with SBA and given RW to amb into bathroom as he reported he needed to go, given verbal and tactile cues for sequencing and safety to turn and sit onto toilet. Once through, pt shows good standing balance by being able to bend over forward in standing to perform pericare with SBA. Pt then amb approx 20 ft over to sink where he exhibits good standing balance with UE support x approx 10 min for hygience and washing up activities however requiring max cues for sequencing of tasks.  At end of session, pt amb with no AD and CGA within room to sit up in recliner with chair alarm on. Pt exhibits decreased safety awareness 2/2 confusion and instructed in calling for help when getting up but decreased carryover noted and nursing informed. Pt left with all needs met and made good progress this date. Will continue to follow per POC. Recommend rehab upon discharge. Progression toward goals:  
[x]      Improving appropriately and progressing toward goals 
[]      Improving slowly and progressing toward goals 
[]      Not making progress toward goals and plan of care will be adjusted PLAN: 
Patient continues to benefit from skilled intervention to address the above impairments. Continue treatment per established plan of care. Discharge Recommendations:  Rehab Further Equipment Recommendations for Discharge:  TBD at next level of care SUBJECTIVE:  
Patient stated I need to use the bathroom.  OBJECTIVE DATA SUMMARY:  
Critical Behavior: 
Neurologic State: Alert, Confused Orientation Level: Oriented to person Cognition: Follows commands, Poor safety awareness Safety/Judgement: Fall prevention Functional Mobility Training: 
Bed Mobility: 
  
Supine to Sit: Stand-by assistance Scooting: Supervision Transfers: 
Sit to Stand: Stand-by assistance Stand to Sit: Stand-by assistance Bed to Chair: Contact guard assistance Balance: 
Sitting: Intact Standing: Impaired; Without support Standing - Static: Fair Standing - Dynamic : Fair Ambulation/Gait Training: 
Distance (ft): 20 Feet (ft)(x2) 
Assistive Device: Walker, rolling(with and without RW ) Ambulation - Level of Assistance: Contact guard assistance Gait Abnormalities: Decreased step clearance Base of Support: Narrowed Speed/Lilia: Pace decreased (<100 feet/min) Step Length: Right shortened;Left shortened Pain: 
Pain level pre-treatment: 0/10 Pain level post-treatment: 0/10 Pain Intervention(s): Medication (see MAR); Rest, Ice, Repositioning Response to intervention: Nurse notified Activity Tolerance:  
Good Please refer to the flowsheet for vital signs taken during this treatment. After treatment:  
[x] Patient left in no apparent distress sitting up in chair 
[] Patient left in no apparent distress in bed 
[x] Call bell left within reach [x] Nursing notified 
[] Caregiver present [x] Chair alarm activated 
[] SCDs applied COMMUNICATION/EDUCATION:  
[x]         Role of Physical Therapy in the acute care setting. [x]         Fall prevention education was provided and the patient/caregiver indicated understanding. [x]         Patient/family have participated as able in working toward goals and plan of care. [x]         Patient/family agree to work toward stated goals and plan of care. []         Patient understands intent and goals of therapy, but is neutral about his/her participation. []         Patient is unable to participate in stated goals/plan of care: ongoing with therapy staff. 
[]         Other: 
 
   
Katlyn Steen Time Calculation: 30 mins

## 2020-11-02 NOTE — ACP (ADVANCE CARE PLANNING)
Advanced Steps Advance Care Planning Conversation Date of conversation: 11/2/2020   [address] [place] [address] Length (minutes): 60 
 
Participants: 
 [x] Patient [x] Next of Kin Name: Gume Sam   Relationship to Patient:daughter Phone number: [phone] [] Pt has 2 other living daughters Laura Felipe and Kristine Felipe has recently  and lives in [facility]. Kristine Llamas lives in [place], [place]. Pt has endured many loses of close family members in 2018, His spouse Familia Juarez, his daughter Konrad Murphy and his sister. Advanced Steps® ACP Facilitator: Hanh Weber, RN, Melany Laguna NP Conversation Topics Understanding of Medical Condition/s AND Potential Complications: 
 
Patient response: Palliative team member met pt at bedside. Mr Phuong Zamora was alert, awake but only aware of person. He was able to tell us about his family, named his children with some struggle. He clearly shared about the losses of his daughter Konrad Murphy, but not his wife stated he was . Pt did have some insight into his Memory loss and was able to connect it to his fall. When questioned, Mr. Phuong Zamora denied the drinking of alcohol or the use of any non-prescibed medication. Pt's alcohol level on admission was 9. Mr. Phuong Zamora does not have an Advanced Medical Directive on file. He is not able to complete one today. His healthcare decisions makers would fall to a consensus of his 3 adult daughters. Goals of care were not discussed with mr. Souza. Next of Kin/Other Surrogate: Placed call to pt's daughter Gume Sam, who resides locally here (10 mins. from pt). Provided brief medical update. Ms. Mark Garcia was able to shared with Palliative team past history of her father. He has not seen a doctor in years. She shared that before the fall she had notice some small behavior change, but dismissed them to aging \"you know he is close to 81\".   Azar Hannah confirmed that her father did not drink. Yet she admitted that \"we did not see him everyday\". Ms Kevin Mckinley expressed gratitude for the call and information. Cardiopulmonary Resuscitation Palliative team discussed CPR and intubation with Ms. Kevin Mckinley. The components of CPR were presented. Explained that the recovery in a pt such as her father with CHR, EF 40%, A-Fib, smoking and now new memory loss would not return him to a quality of life. She stated she will need to speak with her sisters before making any decision to change code status. Order Elected for CPR:  [x]  Attempt Resuscitation []  Do Not Attempt Resuscitation When NOT in Cardiopulmonary Arrest, Order Elected:  
 
[] Comfort Measures 
[] Limited Additional Interventions [x] Full Interventions Artificially Administered Nutrition, Order Elected: Not discussed at this time. [] No Feeding Tube  
[] Feeding Tube for a defined trial period 
[] Feeding Tube long-term if indicated The following was provided (check all that apply): Healthcare Decision Information: 
 [] Help with Breathing Facts 
 [] Tube Feeding Facts [x] CPR Facts  
  
[] Review of existing Advance Directive 
[] Assistance with Completion of New Advance Directive  
[] Review of [place] POST Form Meeting Outcomes: 
 [] ACP discussion completed 
 [] [place] form completed 
[] [place] prepared for Provider review and signature 
 [] Original placed on Chart, if in facility (form to be sent with patient at discharge) [] Copy given to healthcare agent  
 [] Copy scanned to electronic medical record If ACP discussion not completed, last interview topic discussed:Palliative will continue to follow to support pt's daughter and margie with further conversation concerning goals of care and code status. Follow-up plan:   
 [x] Schedule follow-up conversation to continue planning 
 [] Referred individual to Provider for additional questions/concerns [] Advised patient/agent/surrogate to review completed POST form and update if needed with changes in condition, patient preferences or care setting  
 
[] This note routed to one or more involved healthcare providers Oskar MCCLELLANN, RN, [facility] Palliative Medicine Inpatient RN Palliative [place] Line: [phone]

## 2020-11-02 NOTE — PROGRESS NOTES
0600: Patient HR jumped up to 150 while resting quietly. Cardiology answering service called to inform of patient increased HR.   
 
 
[id number] Cardiology paged again, patient still resting and patient heart rate  Is from 140 - 103. Cardiology called again to inform them of heart. MD increased dose of scheduled  Metoprolol and placed PRN orders with with parameters.

## 2020-11-02 NOTE — PROGRESS NOTES
0800 pt states he is more confused than usual, and thinks it is because his fall at his daughter's wedding a few weeks ago. Discussed with physician, Head CT was done on admission. Patient oriented to person, place and situation, but not time. 1000 Patient very impulsive, removing monitors and continuously getting out of bed. Patient easily redirected, but forgetful. 1130 Patient has marry bloody drainage from urethra and dark stools. CBC, BMP, stool occult, and UA ordered. 1200 Patient continuing to remove lines and monitors and get out of chair. Verbal order for restraints received. Patient assisted back to bed, restraints initiated and condom catheter applied. Patient verbalizes understanding. 1230 Patient removed condom catheter and had legs over edge of bed. 1315 patient increasingly agitated, bloody bm X2, removed condom cath x3. Stool sent to lab, unable to obtain UA. Spoke with daughter, Jerrica Bailey, who wishes to speak to a physician. MD ch. Chinyere's phone number [phone].  
 
1400 Verbal orders for ativan 1 mg IV received, and administered.

## 2020-11-03 NOTE — PROGRESS NOTES
Physician Progress Note Dillon Centeno 
CSN #:                  [id number] :                       1940 ADMIT DATE:       10/29/2020 2:23 PM 
[address] [place] DATE: 
RESPONDING 
PROVIDER #:        Erwin Martin MD 
 
 
 
 
QUERY TEXT: 
 
Dear Hospitalist 
Pt admitted with New A.fib w/RVR and cardiomyopathy  . Noted documentation of Acute sys CHF  on 20    ordered Cardiology MD consultant. If possible, please document in progress notes and discharge summary: The medical record reflects the following: 
 
Risk Factors: Admitted with A.fib w/RVR, new?diagnosis per patient ,HTN, new diagnosis-Cardiomyopathy, new, EF 40-45% by echo 10/31/20, patient unable to tolerate pharm nuc due to claustrophobic. At admission  A. fib with RVR, was started on a Cardizem drip with improvement. Other vitals notable for labile and significantly elevated blood pressure, tachypnea with RR in the 20s, and SPO2 in the upper 90s on room air. Overall labs reassuring, NT proBNP 5543. CT head without acute findings, CXR concerning for pulmonary edema versus atypical pneumonia, x-ray right humerus negative Clinical Indicators: Ko BURCIAGA PN New acute systolic heart failure, elevated pro-BNP of 5543 at presentation with edema by CXR new dx HTN and afib w/rvr Treatment: Card. consulted ,? Lopressor increased to 50 mg BID start ACEI/ARB if BP remains elevated. Switched to chronic lasix. Thank you Nika Rodriguez RN    Options provided: 
-- Acute sys CHF confirmed POA 
-- Acute sys CHF confirmed not POA 
-- Acute sys CHF ruled out 
-- Defer to Cardiology consultant documentation regarding Acute sys CHF 
-- Other - I will add my own diagnosis -- Disagree - Not applicable / Not valid -- Disagree - Clinically unable to determine / Unknown 
-- Refer to Clinical Documentation Reviewer PROVIDER RESPONSE TEXT: 
 
The diagnosis of Acute sys CHF was confirmed as POA. Query created by:  Martha Romero on 11/3/2020 10:13 AM 
 
 
Electronically signed by:  Aquilino Saez MD 11/3/2020 6:06 PM 
 
 
 
 
 Never

## 2020-11-03 NOTE — PROGRESS NOTES
Reason for Admission:  Atrial fibrillation with rapid ventricular response ([facility] [address] [I48.91] RUR Score:    14% Plan for utilizing home health:    Pt will most likely need SNF placement Likelihood of Readmission:   LOW Transition of Care Plan:         
 
 
Initial assessment completed with his daughter Néstor Loredo. Cognitive status of patient: confused Face sheet information confirmed:  yes. The patient designates his daughter Néstor Loredo to participate in his discharge plan and to receive any needed information. This patient lives in a trailer alone. Patient is able to navigate steps as needed. Prior to hospitalization, patient was considered to be independent with ADLs/IADLS : yes . Patient has a current ACP document on file: no The daughter or BLS will be available to transport patient home upon discharge. The patient already has no medical equipment available in the home. Patient is not currently active with home health. Patient has not stayed in a skilled nursing facility or rehab. This patient is on dialysis :no 
 
List of available SNF agencies were provided and reviewed with the patient prior to discharge. Freedom of choice signed: yes, for local skilled nursing facilities. PT/OT is currently recommending rehab/SNF. ERNESTO discussed this with pt's daughter. Pt lives alone and they do not have the ability to stay with pt 24/7. The daughter Néstor Loredo is agreeable to SNF. They do not want any [facility] facilities or [address]. Currently, the discharge plan is SNF. Their first choice is [facility]. CM sent referral. CM informed Néstor Loredo that Shawanda Johnson would have to authorize the skilled nursing facility placement. ERNESTO also discussed qualifications for [employer] with Néstor Loredo. She will look in to pt's finances and call CM back with more information. Pt will need insurance auth and COVID testing for SNF placement. The patient states that he can obtain his medications from the pharmacy, and take his medications as directed. Patient's current insurance is [employer]. Care Management Interventions PCP Verified by CM: No 
Mode of Transport at Discharge: S Transition of Care Consult (CM Consult): Discharge Planning Physical Therapy Consult: Yes Occupational Therapy Consult: Yes Current Support Network: Lives Alone, Family Lives [place] Confirm Follow Up Transport: Other (see comment)(S) The Patient and/or Patient Representative was Provided with a Choice of Provider and Agrees with the Discharge Plan?: Yes Name of the Patient Representative Who was Provided with a Choice of Provider and Agrees with the Discharge Plan: Ellie Sam Newell of Choice List was Provided with Basic Dialogue that Supports the Patient's Individualized Plan of Care/Goals, Treatment Preferences and Shares the Quality Data Associated with the Providers?: Yes Discharge Location Discharge Placement: Skilled nursing facility(Ok with local SNFs, first choice is [facility]) Lissett Hamilton RN BSN Care Manager [phone]

## 2020-11-03 NOTE — PROGRESS NOTES
I/[facility] submitted this date for processing. Tracking # [id number] Nemo Hernandez RN BSN Care Manager [phone]

## 2020-11-03 NOTE — ANESTHESIA PREPROCEDURE EVALUATION
Relevant Problems No relevant active problems Anesthetic History No history of anesthetic complications Review of Systems / Medical History Patient summary reviewed, nursing notes reviewed and pertinent labs reviewed Pulmonary Within defined limits Neuro/Psych Comments: Encephalopthy Encephalopathy and confusion Cardiovascular Dysrhythmias : atrial fibrillation Exercise tolerance: <4 METS 
  
GI/Hepatic/Renal 
  
 
 
 
 
 
Comments: Lower GI bleed with several bloody stools Endo/Other Within defined limits Other Findings Physical Exam 
 
Airway Mallampati: III 
TM Distance: 4 - 6 cm Neck ROM: normal range of motion Mouth opening: Normal 
 
 Cardiovascular Regular rate and rhythm,  S1 and S2 normal,  no murmur, click, rub, or gallop Dental 
 
Dentition: Poor dentition Pulmonary Breath sounds clear to auscultation Abdominal 
GI exam deferred Other Findings Anesthetic Plan ASA: 4 Anesthesia type: MAC Anesthetic plan and risks discussed with: Son / Daughter Consent obtained from daughter over telephone

## 2020-11-03 NOTE — PROCEDURES
[facility] [address], [address] Endoscopic Gastroduodenoscopy Procedure Note Ree Rodriguez 1940 
[id number] Indication:  Melena/hematochezia : Brittani Adhikari MD 
 
Referring Provider:  None Anesthesia/Sedation:  MAC anesthesia Propofol Procedure Details After infomed consent was obtained for the procedure, with all risks and benefits of procedure explained the patient was taken to the endoscopy suite and placed in the left lateral decubitus position. Following sequential administration of sedation as per above, the endoscope was inserted into the mouth and advanced under direct vision to third portion of the duodenum. A careful inspection was made as the gastroscope was withdrawn, including a retroflexed view of the proximal stomach; findings and interventions are described below. Findings:  
Esophagus:normal 
Stomach: mild changes of antritis Duodenum/jejunum:  Ulcer w/ adherent clot at apex behind a fold Therapies:  Multiple antral Bxs taken, R/O H pylori . 4 ml of epin 1;10k injected and base of ulcer cauterized w/ bicap. Coagulation deemed inadequate due to location at apex, behind a fold. Minimal bleeding at end of procedure Specimens:  
ID Type Source Tests Collected by Time Destination 1 : Body & Antrum bx's Preservative Gastric  Asuncion MD Fermin 11/3/2020 1113 Pathology Complications:   None; patient tolerated the procedure well. EBL:  None. Impression:    duodenal  ulcer, w cautery w injection w bx's of body and Antrum Recommendations: 
-Acid suppression with a proton pump inhibitor. , -Await pathology. , -clear liquid only 
- will add Carafate for several days 
-monitor H&H, Tx as needed If bleed active, consider IR embolization Brittani Adhikari MD 
11/3/2020  11:44 AM

## 2020-11-03 NOTE — PROGRESS NOTES
Hospitalist Progress Note Patient: Maile Councilman Age: 79 y.o. : 1940 MR#: [id number] SSN: [id number] Date/Time: 11/3/2020 8:34 AM 
 
DOA: 10/29/2020 PCP: None Subjective: He was seen post EGD, he was found to have duodenum ulcer required Cauterize with bicap. He remains alert and able to expressed that he has no problem. He expressed that he wants to get to restroom to urinate. He was impulsive however. Nursing report that he pulled out his condom cath, IV line today. I called and spoke with all his daughters today for clinical updates. They are adamant that their father does not drink alcohol and the positive level found on admission might be related to possible metabolism derangement. They asked to repeat [facility] level and re-consider different etiology for his persistent altered mental status. Interval Hospital Course: 
 
 
 
 
ROS: limited but able to stated no fever, no headache, no dizziness No swallowing pain, No chest pain, no palpitation, no shortness of breath, no abd pain, No diarrhea, no urinary complaint, no leg pain Assessment/Plan:  
1.  GI bleed due to Duodenal ulcer, s/p cauterize with bicap, s/p EGD 2. Acute anemia due to blood loss 3. Acute encephalopathy with report of visual hallucination, currently unclear etiology No clear evidence of infection, CT head without acute event. TSH normal  
4. Alcohol positive on admission but family does not confirm him drinking alcohol 5. Paroxymal atrial fibrillation with RVR, improved 6. Acute systolic heart failure, elevated proBNP but no evidence of volume overload 7. Fall, mechanical vs syncope 8. Right upper extremity pain due to fall 9. H/o tobacco abuse If patient is persistent in his encephalopathy, will get MRI brain. He needs sedative medications to assist in his MRI. Will be benefit if his family can sit at bedside to re-orient him as hospital delirium can persists Check EtOH level. Cont thiamine IV supplement Avoid Benzo for now Consider Neurology consult Hgb/Hct monitoring, transfuse in if Hgb<7. Cont PPI. Can given IV venofer Avoid lovenox Continue his current cardiac medications. No further invasive cardiac workup per cardiology. Echo reviewed. Nutritional support PT/OT Full code Called and spoke with daughters with clinical updates and care plan. Additional Notes:   
Time spent >35 minutes Case discussed with:  [x]Patient  [x]Family  [x]Nursing  [x]Case Management DVT Prophylaxis:  []Lovenox  []Hep SQ  [x]SCDs  []Coumadin   []On Heparin gtt Signed By: Popeye Remy MD   
 November 3, 2020 8:34 AM  
  
 
 
 
 
Objective:  
VS:  
Visit Vitals /61 Pulse 95 Temp 98.2 °F (36.8 °C) Resp 20 Ht 5' 9\" (1.753 m) Wt 74.3 kg (163 lb 14.4 oz) SpO2 100% BMI 24.20 kg/m² Tmax/24hrs: Temp (24hrs), Av.2 °F (36.8 °C), Min:97.8 °F (36.6 °C), Max:98.4 °F (36.9 °C) No intake or output data in the 24 hours ending 20 0834 Tele: sinus General:  Cooperative, Not in acute distress HEENT: PERRL, EOMI, supple neck, no JVD, dry oral mucosa Cardiovascular: S1S2 regular, no rub/gallop Pulmonary: Clear air entry bilaterally, no wheezing, no crackle GI:  Soft, non tender, non distended, +bs, no guarding Extremities:  No pedal edema, +distal pulses appreciated Neuro: AOx3, moving all extremities, no gross deficit. Additional:  
 
 
Current Facility-Administered Medications Medication Dose Route Frequency  0.9% sodium chloride infusion  50 mL/hr IntraVENous CONTINUOUS  
 thiamine (B-1) 500 mg in 0.9% sodium chloride 50 mL IVPB  500 mg IntraVENous TID  [START ON 2020] thiamine (B-1) 250 mg in 0.9% sodium chloride 50 mL IVPB  250 mg IntraVENous DAILY  metoprolol tartrate (LOPRESSOR) tablet 50 mg  50 mg Oral Q12H  
 metoprolol (LOPRESSOR) injection 5 mg  5 mg IntraVENous Q4H PRN  
  sodium chloride (NS) flush 5-40 mL  5-40 mL IntraVENous Q8H  
 sodium chloride (NS) flush 5-40 mL  5-40 mL IntraVENous PRN  
 LORazepam (ATIVAN) tablet 1 mg  1 mg Oral Q1H PRN Or  
 LORazepam (ATIVAN) injection 1 mg  1 mg IntraVENous Q1H PRN  
 LORazepam (ATIVAN) tablet 2 mg  2 mg Oral Q1H PRN Or  
 LORazepam (ATIVAN) injection 2 mg  2 mg IntraVENous Q1H PRN  
 LORazepam (ATIVAN) injection 3 mg  3 mg IntraVENous Q15MIN PRN  pantoprazole (PROTONIX) 40 mg in 0.9% sodium chloride 10 mL injection  40 mg IntraVENous Q12H  furosemide (LASIX) tablet 20 mg  20 mg Oral DAILY  hydrALAZINE (APRESOLINE) 20 mg/mL injection 10 mg  10 mg IntraVENous Q6H PRN  
 [Held by provider] aspirin chewable tablet 81 mg  81 mg Oral DAILY  therapeutic multivitamin (THERAGRAN) tablet 1 Tab  1 Tab Oral DAILY  folic acid (FOLVITE) tablet 1 mg  1 mg Oral DAILY  sodium chloride (NS) flush 5-40 mL  5-40 mL IntraVENous Q8H  
 sodium chloride (NS) flush 5-40 mL  5-40 mL IntraVENous PRN  
 acetaminophen (TYLENOL) tablet 650 mg  650 mg Oral Q6H PRN Or  
 acetaminophen (TYLENOL) suppository 650 mg  650 mg Rectal Q6H PRN  polyethylene glycol (MIRALAX) packet 17 g  17 g Oral DAILY PRN  promethazine (PHENERGAN) tablet 12.5 mg  12.5 mg Oral Q6H PRN Or  
 ondansetron (ZOFRAN) injection 4 mg  4 mg IntraVENous Q6H PRN Lab/Data Review: 
Labs: Results:  
   
Chemistry Recent Labs 11/03/20 
0055 11/02/20 
1110 GLU 97 115*  143  
K 4.3 4.5  
* 109 CO2 28 29 BUN 44* 42* CREA 0.99 1.07  
BUCR 44* 39* AGAP 4 5 CA 7.9* 8.4* Recent Labs 11/03/20 
0055 11/02/20 
1110 ALT 14* 13* TP 5.8* 6.1* ALB 2.7* 3.0*  
GLOB 3.1 3.1 AGRAT 0.9 1.0  
  
CBC w/Diff Recent Labs 11/03/20 
0505 11/03/20 
0055 11/02/20 
1110 WBC  --  9.9 7.3  
RBC  --  3.22* 3.72* HGB 9.4* 10.0* 11.8* HCT 29.5* 31.5* 36.7 MCV  --  97.8* 98.7*  
MCH  --  31.1 31.7 MCHC  --  31.7 32.2 RDW  --  12.8 13.1 PLT  --  208 227 GRANS  --  75* 73 LYMPH  --  18* 18* EOS  --  0 0 Coagulation No results for input(s): PTP, INR, APTT, INREXT in the last 72 hours. Iron/Ferritin No results found for: IRON, FE, TIBC, IBCT, PSAT, FERR   
BNP Cardiac Enzymes Lab Results Component Value Date/Time CK 83 10/30/2020 08:14 AM  
 CK - MB 1.2 10/30/2020 08:14 AM  
 CK-MB Index 1.4 10/30/2020 08:14 AM  
 Troponin-I, QT 0.04 10/30/2020 08:14 AM  
 
  
Lactic Acid Thyroid Studies All Micro Results None Images: 
 
CT (Most Recent). CT Results (most recent): 
Results from Hospital Encounter encounter on 10/29/20 CT HEAD WO CONT Narrative CT OF THE BRAIN 
 
COMPARISON: None. INDICATIONS: Fall and memory loss. TECHNIQUE: Volumetric data acquisition was performed   through the brain on a 
multislice scanner and reconstructed in the axial plane. FINDINGS:   
 
The ventricles and CSF spaces are enlarged consistent with age associated 
atrophy. There is no midline shift. Vi Staggers The brain parenchyma is of generally normal attenuation. There is decreased 
attenuation in the periventricular white matter consistent with presumed 
microvascular disease. There is no hemorrhage evident. There are no pathologic fluid collections. There are no pathologic calcifications. . 
. The visible portions of the paranasal sinuses: Are clear. Impression IMPRESSION:  
 
Atrophy and microvascular disease. No acute intracranial process. All CT scans at this facility are performed using dose optimization technique as 
appropriate to the performed exam, to include automated exposure control, 
adjustment of the mA and/or kV according to patient's size (Including 
appropriate matching for site-specific examinations), or use of iterative 
reconstruction technique. XRAY (Most Recent) EKG No results found for this or any previous visit. 2D ECHO 10/29/20 ECHO ADULT COMPLETE 10/31/2020 10/31/2020 Narrative · LV: Estimated LVEF is 40 - 45%. Visually measured ejection fraction. Normal cavity size. Mild concentric hypertrophy. · RV: Mildly dilated right ventricle. Mildly reduced systolic function. · PA: Mild pulmonary hypertension. Pulmonary arterial systolic pressure is 46 mmHg. · IVC: Moderately elevated central venous pressure (10-15 mmHg); IVC  
diameter is larger than 21 mm and collapses more than 50% with  
respiration.  
   
  Signed by: Allison Rhodes MD

## 2020-11-03 NOTE — PROGRESS NOTES
Hospitalist paged to inform him of patient's third moderate to large bowel movement. MD informed that a stat H&H and type and screen. MD to come to patient bedside to assess patient status.

## 2020-11-03 NOTE — PROGRESS NOTES
Patient had approximately 5 large bloody bowel movements with clots. Hemoglobin down to 9.4, from 13 four days ago, systolic blood pressure 375 and heart rate 88 Updated GI Dr. Luzma Lee Patient to remain n.p.o.

## 2020-11-03 NOTE — PROGRESS NOTES
80GI MD on call paged for patient having a fourth bloody bowel movement for the night with a decrease in BP from earlier today. MD stated to called hospitalist and have paitent get blood and fluids.

## 2020-11-03 NOTE — PROGRESS NOTES
Hospitalist paged again to inform him that patient is having another bloody BM. MD aware of patient most recent BP of 118/91 (96). MD to wait for results of latest H&H draw from lab this morning. Patient has had a total of five bloody bowel during the shift. Patient had has been more cooperative through the night and only gets slight agitated when having to have a bowel movement. Patient is easily redirected and follows commands.

## 2020-11-03 NOTE — PERIOP NOTES
TRANSFER - OUT REPORT: 
 
Verbal report given to Bertha LEIGH(name) on Gagandeep Brantley  being transferred to CVT Stepdown(unit) for routine progression of care Report consisted of patients Situation, Background, Assessment and  
Recommendations(SBAR). Information from the following report(s) SBAR, Kardex, OR Summary, Procedure Summary, Intake/Output, Accordion, Recent Results and Med Rec Status was reviewed with the receiving nurse. Lines:  
Peripheral IV 10/30/20 Right Forearm (Active) Site Assessment Clean, dry, & intact 11/03/20 0740 Phlebitis Assessment 0 11/03/20 0740 Infiltration Assessment 0 11/03/20 0740 Dressing Status Clean, dry, & intact 11/03/20 0740 Dressing Type Transparent 11/03/20 0740 Hub Color/Line Status Pink 11/03/20 0740 Action Taken Open ports on tubing capped 11/01/20 1615 Alcohol Cap Used Yes 11/03/20 0740 Opportunity for questions and clarification was provided. Patient transported with: 
 [employer]

## 2020-11-03 NOTE — PROGRESS NOTES
[facility]  -  Progress Note Patient: Jabari Fuller MRN: [id number]  SSN: [id number] YOB: 1940  Age: 79 y.o. Sex: male Admit Date: 10/29/2020 Assessment:  
 
-A.fib w/RVR, new diagnosis per patient 
-Cardiomyopathy, new, EF 40-45% by echo 10/31/20, patient unable to tolerate pharm nuc due to claustrophobic  
-Duodenal ulcer by EGD 11/3/20 
-Indeterminate troponin 0.05 max, not consistent with primary ACS  
-New acute systolic heart failure, elevated pro-BNP of 5543 at presentation with edema by CXR 
-Confusion, memory loss, ongoing workup -s/p fall about 3 weeks ago 
-HTN, new diagnosis 
  
No primary cardiologist or primary physician Plan:  
 
Suboptimal candidate for anticoagulation due to duodenal ulcer, recent fall. Continue PO Lopressor, dose held this AM due to procedure, likely cause for increased rates this afternoon. Appreciate assistance of palliative team.  No further cardiac workup planned at this time. Subjective:  
 
Seen in PACU, somnolent, resting comfortably. Objective:  
  
Patient Vitals for the past 8 hrs: 
 Temp Pulse Resp BP SpO2  
11/03/20 1205 98.3 °F (36.8 °C) (!) 102 17 120/69 94 % 11/03/20 1130  (!) 128  (!) 144/89 99 % 11/03/20 1122 98.2 °F (36.8 °C) (!) 121 27 (!) 133/98 100 % 11/03/20 1038 97.3 °F (36.3 °C) (!) 112 18 134/78 99 % 11/03/20 0740 98.2 °F (36.8 °C) 95 20 116/61 100 % Patient Vitals for the past 96 hrs: 
 Weight 11/03/20 0401 74.3 kg (163 lb 14.4 oz) 11/02/20 0818 74.4 kg (164 lb) 11/02/20 0342 74.4 kg (164 lb 1.6 oz) 11/01/20 0400 76.7 kg (169 lb) 10/31/20 1118 78 kg (172 lb) 10/31/20 0406 78.2 kg (172 lb 4.8 oz) Intake/Output Summary (Last 24 hours) at 11/3/2020 1441 Last data filed at 11/3/2020 1257 Gross per 24 hour Intake 150 ml Output 450 ml Net -300 ml Physical Exam: 
General:  Somnolent, resting comfortably in no apparent distress, appears stated age Neck:  supple Lungs:  clear to auscultation bilaterally Heart:  Irregularly irregular rhythm Abdomen:  abdomen is soft without significant tenderness, masses, organomegaly or guarding Extremities:  Atraumatic, no edema Data Review:  
 
Labs: Results:  
   
Chemistry Recent Labs 11/03/20 
0055 11/02/20 
1110 GLU 97 115*  143  
K 4.3 4.5  
* 109 CO2 28 29 BUN 44* 42* CREA 0.99 1.07  
CA 7.9* 8.4* AGAP 4 5 BUCR 44* 39* AP 54 65  
TP 5.8* 6.1* ALB 2.7* 3.0*  
GLOB 3.1 3.1 AGRAT 0.9 1.0  
  
CBC w/Diff Recent Labs 11/03/20 
0505 11/03/20 
0055 11/02/20 
1110 WBC  --  9.9 7.3  
RBC  --  3.22* 3.72* HGB 9.4* 10.0* 11.8* HCT 29.5* 31.5* 36.7 PLT  --  208 227 GRANS  --  75* 73 LYMPH  --  18* 18* EOS  --  0 0 Lipid Panel Lab Results Component Value Date/Time Cholesterol, total 103 10/31/2020 01:53 AM  
 HDL Cholesterol 34 (L) 10/31/2020 01:53 AM  
 LDL, calculated 56.8 10/31/2020 01:53 AM  
 VLDL, calculated 12.2 10/31/2020 01:53 AM  
 Triglyceride 61 10/31/2020 01:53 AM  
 CHOL/HDL Ratio 3.0 10/31/2020 01:53 AM  
  
Liver Enzymes Recent Labs 11/03/20 
0055 TP 5.8* ALB 2.7* AP 54 Thyroid Studies Lab Results Component Value Date/Time  TSH 2.45 10/29/2020 02:45 PM

## 2020-11-03 NOTE — PROGRESS NOTES
Problem: Falls - Risk of 
Goal: *Absence of Falls Description: Document Aria Heads Fall Risk and appropriate interventions in the flowsheet. Outcome: Progressing Towards Goal 
Note: Fall Risk Interventions: 
Mobility Interventions: Patient to call before getting OOB Mentation Interventions: Toileting rounds Medication Interventions: Patient to call before getting OOB Elimination Interventions: Urinal in reach, Toileting schedule/hourly rounds History of Falls Interventions: Bed/chair exit alarm Problem: Patient Education: Go to Patient Education Activity Goal: Patient/Family Education Outcome: Progressing Towards Goal 
  
Problem: Non-Violent Restraints Goal: *Removal from restraints as soon as assessed to be safe Outcome: Progressing Towards Goal 
Goal: *No harm/injury to patient while restraints in use Outcome: Progressing Towards Goal 
Goal: *Patient's dignity will be maintained Outcome: Progressing Towards Goal 
Goal: *Patient Specific Goal (EDIT GOAL, INSERT TEXT) Outcome: Progressing Towards Goal 
Goal: Non-violent Restaints:Standard Interventions Outcome: Progressing Towards Goal 
Goal: Non-violent Restraints:Patient Interventions Outcome: Progressing Towards Goal 
Goal: Patient/Family Education Outcome: Progressing Towards Goal 
  
Problem: Nutrition Deficit Goal: *Optimize nutritional status Outcome: Progressing Towards Goal 
  
Problem: Patient Education: Go to Patient Education Activity Goal: Patient/Family Education Outcome: Progressing Towards Goal 
  
Problem: Patient Education: Go to Patient Education Activity Goal: Patient/Family Education Outcome: Progressing Towards Goal 
  
Problem: Pressure Injury - Risk of 
Goal: *Prevention of pressure injury Description: Document Rafa Scale and appropriate interventions in the flowsheet. Outcome: Progressing Towards Goal 
Note: Pressure Injury Interventions: Sensory Interventions: Assess changes in LOC, Keep linens dry and wrinkle-free, Turn and reposition approx. every two hours (pillows and wedges if needed) Moisture Interventions: Absorbent underpads Activity Interventions: Increase time out of bed Mobility Interventions: HOB 30 degrees or less, Turn and reposition approx. every two hours(pillow and wedges) Nutrition Interventions: Offer support with meals,snacks and hydration Problem: Patient Education: Go to Patient Education Activity Goal: Patient/Family Education Outcome: Progressing Towards Goal

## 2020-11-03 NOTE — PROGRESS NOTES
[email or web address] 
[phone] Gastroenterology follow up-Progress note Impression: 1. GI bleed - continued bleeding overnight, likely upper source 2. Acute anemia - hgb now 9.4, due to #1 3. A-fib with RVR - lovenox started 11/1/2020 4. Cardiomyopathy - EF 40-45% by echo 10/31/2020 5. New acute systolic HF 6. Encephalopathy - ? Acute vs chronic, toxic or metabolic, ammonia 41, serum ETOH 9 on admission 10/30/2020 7. Hx hallucinations, memory loss 8. Hx recreational drug use Plan: 1. EGD today 2. Keep NPO 
3. IV PPI 4. Monitor h/h, transfuse per protocol 5. Medical management per primary team 
 
Chief Complaint: GI bleed Subjective:  Denies abdominal pain. Multiple dark bloody BMs overnight, some clotting per nursing staff. ROS: Denies any fevers, chills, rash. Eyes: conjunctiva normal, EOM normal  
Neck: ROM normal, supple and trachea normal  
Cardiovascular: heart normal, intact distal pulses, normal rate and regular rhythm Pulmonary/Chest Wall: breath sounds normal and effort normal  
Abdominal: appearance normal, bowel sounds normal and soft, non-acute, non-tender Patient Active Problem List  
Diagnosis Code  Atrial fibrillation with rapid ventricular response (HCC) I48.91  
 Acute lower gastrointestinal hemorrhage K92.2 Visit Vitals /61 Pulse 95 Temp 98.2 °F (36.8 °C) Resp 20 Ht 5' 9\" (1.753 m) Wt 74.3 kg (163 lb 14.4 oz) SpO2 100% BMI 24.20 kg/m² No intake or output data in the 24 hours ending 11/03/20 9469 CBC w/Diff Lab Results Component Value Date/Time WBC 9.9 11/03/2020 12:55 AM  
 RBC 3.22 (L) 11/03/2020 12:55 AM  
 HGB 9.4 (L) 11/03/2020 05:05 AM  
 HCT 29.5 (L) 11/03/2020 05:05 AM  
 MCV 97.8 (H) 11/03/2020 12:55 AM  
 MCH 31.1 11/03/2020 12:55 AM  
 MCHC 31.7 11/03/2020 12:55 AM  
 RDW 12.8 11/03/2020 12:55 AM  
  11/03/2020 12:55 AM  
 Lab Results Component Value Date/Time GRANS 75 (H) 11/03/2020 12:55 AM  
 LYMPH 18 (L) 11/03/2020 12:55 AM  
 EOS 0 11/03/2020 12:55 AM  
 BASOS 0 11/03/2020 12:55 AM  
  
Basic Metabolic Profile Recent Labs 11/03/20 
0055   
K 4.3 * CO2 28 BUN 44*  
CA 7.9* Hepatic Function Lab Results Component Value Date/Time ALB 2.7 (L) 11/03/2020 12:55 AM  
 TP 5.8 (L) 11/03/2020 12:55 AM  
 AP 54 11/03/2020 12:55 AM  
 No results found for: TBIL Coags No results for input(s): PTP, INR, APTT, INREXT in the last 72 hours. [facility]. [email or web address] Phone: [phone] Pager: [phone]

## 2020-11-03 NOTE — PROGRESS NOTES
TRANSFER - IN REPORT: 
 
Verbal report received from 1 [facility] RN(name) on Ree Rodriguez  being received from cvt stepdown(unit) for ordered procedure Report consisted of patients Situation, Background, Assessment and  
Recommendations(SBAR). Information from the following report(s) SBAR, Kardex, [facility] and Recent Results was reviewed with the receiving nurse. Opportunity for questions and clarification was provided. Assessment completed upon patients arrival to unit and care assumed.

## 2020-11-03 NOTE — PROGRESS NOTES
PT orders received, chart reviewed. Pt unable to participate with PT due to: 
[]  Nausea/vomiting 
[]  Eating 
[]  Pain 
[]  Pt lethargic 
[]  Off Unit 
[]  Pt refused 
[x]  Other per nursing pt had 6 bloody BMs last night and trending down with H/H. Nursing asked for him not OOB at this time 826. Pt off the floor at endo at 1046 Will f/u later as patient's schedule allows.  Thank you for this referral. 
Jewels Guerrier, PT, DPT

## 2020-11-03 NOTE — ANESTHESIA POSTPROCEDURE EVALUATION
Procedure(s): ENDOSCOPY w cautery w injection w bx's. MAC Anesthesia Post Evaluation Multimodal analgesia: multimodal analgesia used between 6 hours prior to anesthesia start to PACU discharge Patient location during evaluation: bedside Patient participation: complete - patient participated Level of consciousness: awake Pain score: 0 Pain management: adequate Airway patency: patent Anesthetic complications: no 
Cardiovascular status: tachycardic and stable Respiratory status: acceptable Hydration status: acceptable Post anesthesia nausea and vomiting:  controlled Final Post Anesthesia Temperature Assessment:  Normothermia (36.0-37.5 degrees C) INITIAL Post-op Vital signs:  
Vitals Value Taken Time /89 11/3/2020 11:30 AM  
Temp 36.8 °C (98.2 °F) 11/3/2020 11:22 AM  
Pulse 133 11/3/2020 11:36 AM  
Resp 28 11/3/2020 11:33 AM  
SpO2 98 % 11/3/2020 11:32 AM  
Vitals shown include unvalidated device data.

## 2020-11-03 NOTE — PROGRESS NOTES
Patient had 3 large bloody bowel movements. Systolic blood pressure stable over 120. Soft abdominal exam.  No active ongoing bleeding per rectum when seen. CBC resulted with hemoglobin of 10.0 Will monitor and call GI if have recurrent bleeding or further H&H drop Ordered H&H every 6 hours Continue IV Protonix

## 2020-11-03 NOTE — PROGRESS NOTES
Physician Progress Note PATIENTSergio Dandy 
[facility] #:                  [id number] :                       1940 ADMIT DATE:       10/29/2020 2:23 PM 
[address] [place] DATE: 
RESPONDING 
PROVIDER #:        Nathaly High PA-C 
 
 
 
 
QUERY TEXT: 
 
Dear Hospitalist 
Pt admitted with New A.fib w/RVR and cardiomyopathy  . Noted documentation of Acute sys CHF  on 20    ordered Cardiology MD consultant. If possible, please document in progress notes and discharge summary: The medical record reflects the following: 
 
Risk Factors: Admitted with A.fib w/RVR, new?diagnosis per patient ,HTN, new diagnosis-Cardiomyopathy, new, EF 40-45% by echo 10/31/20, patient unable to tolerate pharm nuc due to claustrophobic. At admission  A. fib with RVR, was started on a Cardizem drip with improvement. Other vitals notable for labile and significantly elevated blood pressure, tachypnea with RR in the 20s, and SPO2 in the upper 90s on room air. Overall labs reassuring, NT proBNP 5543. CT head without acute findings, CXR concerning for pulmonary edema versus atypical pneumonia, x-ray right humerus negative Clinical Indicators: Ko BURCIAGA PN New acute systolic heart failure, elevated pro-BNP of 5543 at presentation with edema by CXR new dx HTN and afib w/rvr Treatment: Card. consulted ,? Lopressor increased to 50 mg BID start ACEI/ARB if BP remains elevated. Switched to chronic lasix. Thank you Judi Lynn RN    Options provided: 
-- Acute sys CHF confirmed POA 
-- Acute sys CHF confirmed not POA 
-- Acute sys CHF ruled out 
-- Defer to Cardiology consultant documentation regarding Acute sys CHF 
-- Other - I will add my own diagnosis -- Disagree - Not applicable / Not valid -- Disagree - Clinically unable to determine / Unknown 
-- Refer to Clinical Documentation Reviewer PROVIDER RESPONSE TEXT: 
 
I defer to Cardiology consultant regarding documentation of Acute sys CHF Query created by:  Jessi Interiano on 11/2/2020 5:18 PM 
 
 
Electronically signed by:  Jerica Morel PA-C 11/3/2020 8:41 AM

## 2020-11-03 NOTE — PERIOP NOTES
.. TRANSFER - IN REPORT: 
 
Verbal report received from MEAGAN [address] on Eligio Union  being received from [facility] for routine progression of care Report consisted of patients Situation, Background, Assessment and  
Recommendations(SBAR). Information from the following report(s) Procedure Verification was reviewed with the receiving nurse. Opportunity for questions and clarification was provided. Assessment completed upon patients arrival to unit and care assumed.

## 2020-11-03 NOTE — PROGRESS NOTES
0900 Spoke with Zeb Oreilly, who states she is off work today and would like to be the primary contact for the patient. Zeb Oreilly expresses concern with being unable to reach patient in the room. Explained that patient has been confused and agitated between sleep periods. Updated Zeb Oreilly about plans for EGD and that someone would be in contact with family for consent. Zeb Oreilly asked that I call her later with update and feel free to call family to help calm the patient when he is agitated. Informed Zeb Oreilly that the agitation usually coincides with toileting needs and that it is not always possible to assist patient with the phone while assisting with hygiene and toileting needs, but that I would assist him to speak with family when he is alert enough to do so.  
 
1200 patient returned from procedure, assisted with hygiene and answering phone to talk to family. Patient states he is \"drowsy and will take a nap. \"  
 
1230 Patient trying to get out of bed, pulling lines, and unable to void in urinal. Placed a condom catheter on patient. 1300 Patient removed condom catheter, gown and telemetry. 1330  Patient removed gown and voided in the floor. [id number] Patient refusing to get back in bed, removed telemetry and gown. PRN ativan given. IV no longer patent. New IV placed in right hand. Paged MD for new order for restraints. 1600 [facility] called again and stated she has not received any information and has not been able to speak with her father. Reiterated that the patient is extremely agitated between rest periods and informed that the patient had spoken with family previously and stated he would like to take a nap. Zeb Oreilly stated that it 'doesn'tt matter if he talked to other family he was supposed to talk to me' and asked if RN remembered previous conversation.  Assured Zeb Oreilly I remembered the conversation and had not had an opportunity to provide updates as I have been in the room tending to the patient, update on current status provided. Floyd Correa asked to speak to physician, assured her physician would receive the message. Floyd Correa asked what time the physician would call her, explained I would have to page the physician and am unable to give exact time. Floyd Correa became verbally aggressive, phone call ended. MD paged.

## 2020-11-04 NOTE — PROGRESS NOTES
Palliative Medicine Consult [employer]: [phone] ([phone]) [facility]: [phone] Palliative Medicine Team member for palliative medicine consult for follow. Mr. Marianela Cramer is awake, alert but only aware of person. Pt had no c/o pain in right shoulder or any other areas. Pt was being assisted with AM meal. Pt has required soft wrist restraints due to behaviors and pulling of lives. Spoke with bedside RN who has already spoken with pts daughter Holly Kin and provided update on pts condition. Palliative team member called and spoke with pts daughter Holly Kin for support. She stated that Dr. Arnie Levine has called today and provided medical update and provided update to all 3 family members on [address] [facility]. She shared that this has all of this has hit us so all of a sudden, we are trying to get a handle on it. This writer provided words of support. This writer discussed the question of CPR. Explained that our team had spoken with her sister Edgar Menon on Monday and requested she discuss it with the other siblings. This writer discussed the benefits and burdens of CPR and intubation in the event of pts heart and breathing stopping. Offered educational materials be emailed, Ms. Marianela Cramer declined, but will update her sisters. Palliative team will continue to follow to determine if pt can complete an ADM naming a Healthcare decision maker. Goals of care - full code with full interventions. Laureen MCCLELLANN, RN, [facility] Palliative Medicine Inpatient RN OPPRTUNITY Palliative [facility] Line: [phone] ([phone])

## 2020-11-04 NOTE — PROGRESS NOTES
Hospitalist Progress Note Patient: Lizzie Cockayne Age: 79 y.o. : 1940 MR#: [id number] SSN: [id number] Date/Time: 2020 9:28 AM 
 
DOA: 10/29/2020 PCP: None Subjective:  
 
Patient is able to express that he sustained a fall at his daughter's wedding. He denied having chest pain or chest palpitation or dizziness prior to his fall. He thought he tripped over due to the whole day of walking. I called and confirmed this with his Daughter Katie White today. Currently, he knows he is in the hospital because he has \"problem with my head. \" He denies pain in his head. He knows he is [facility] [place] [facility]\". He does not know what year this is but recount that \"the world is going through a bad time. \" He denied drinking alcohol at home Vitals reviewed, he is still in afib. No chest pain. Lab with stable Hgb/hct, no transfusion needed. He has low iron level Renal function with slight elevated Na. He is able to start on liquid diet this AM. PT/OT is still pending since he was still on wrist restrain this AM 
 
I called and spoke with his daughter, Mayra Pathak, [phone] with clinical updates and plan off care. ROS:  No current fever/chills, no headache, no dizziness, no facial pain, no sinus congestion, No swallowing pain, No chest pain, no palpitation, no shortness of breath, no abd pain, No diarrhea, no urinary complaint, no leg pain or swelling Assessment/Plan:  
 
1.  GI bleed due to Duodenal ulcer, s/p cauterize with bicap, s/p EGD 2. Acute anemia due to blood loss, stable Hgb/Hct Iron deficiency anemia 3. Acute encephalopathy with report of visual hallucination, currently unclear etiology No clear evidence of infection, CT head without acute event. TSH normal 
     -improving today compare to yesterday 4. Alcohol positive on admission but family does not confirm him drinking alcohol  
     -patient confirmed that he does not drinks alcohol. -negative alcohol level on repeat 5. Paroxymal atrial fibrillation with RVR, in rate control 6. Acute systolic heart failure, elevated proBNP but no evidence of volume overload 7. Fall, mechanical vs syncope 8. Right upper extremity pain due to fall 9. H/o tobacco abuse 10. Hypernatremia due to lack of fluid intake Can advance diet today. Spoke with nursing to d/c wrist restrain, he needs to participate with PT/OT. Will be benefit if his family can sit at bedside to re-orient him as hospital delirium can persists Pending MRI brain. He needs sedative medications to assist in his MRI. (can use haldol) Cont thiamine IV supplement Avoid Benzo for now Consider Neurology consult Hgb/Hct monitoring, transfuse in if Hgb<7. Cont PPI. ordered IV venofer Avoid lovenox Continue his current cardiac medications. No further invasive cardiac workup per cardiology. Might need to hold lasix temporarily if Na worsen. Echo reviewed. Nutritional support PT/OT Full code Called and spoke with daughter with clinical updates and care plan. Additional Notes:   
Time spent >35 minutes Case discussed with:  [x]Patient  [x]Family  [x]Nursing  [x]Case Management DVT Prophylaxis:  []Lovenox  []Hep SQ  [x]SCDs  []Coumadin   []On Heparin gtt Signed By: Martínez Saez MD   
 2020 9:28 AM  
  
 
 
 
 
Objective:  
VS:  
Visit Vitals /71 Pulse (!) 110 Temp 97.1 °F (36.2 °C) Resp 23 Ht 5' 9\" (1.753 m) Wt 74.3 kg (163 lb 14.4 oz) SpO2 98% BMI 24.20 kg/m² Tmax/24hrs: Temp (24hrs), Av.7 °F (36.5 °C), Min:97.1 °F (36.2 °C), Max:98.3 °F (36.8 °C) Intake/Output Summary (Last 24 hours) at 2020 8595 Last data filed at 11/3/2020 1257 Gross per 24 hour Intake 150 ml Output 450 ml Net -300 ml Tele: sinus General:  Cooperative, Not in acute distress HEENT: PERRL, EOMI, supple neck, no JVD, dry oral mucosa Cardiovascular: S1S2 regular, no rub/gallop Pulmonary: Clear air entry bilaterally, no wheezing, no crackle GI:  Soft, non tender, non distended, +bs, no guarding Extremities:  No pedal edema, +distal pulses appreciated Neuro: AOx3, moving all extremities, no gross deficit. Additional:  
 
 
Current Facility-Administered Medications Medication Dose Route Frequency  iron sucrose (VENOFER) 300 mg in 0.9% sodium chloride 250 mL IVPB  300 mg IntraVENous Q24H  
 0.9% sodium chloride infusion  50 mL/hr IntraVENous CONTINUOUS  
 thiamine (B-1) 500 mg in 0.9% sodium chloride 50 mL IVPB  500 mg IntraVENous TID  [START ON 11/5/2020] thiamine (B-1) 250 mg in 0.9% sodium chloride 50 mL IVPB  250 mg IntraVENous DAILY  pantoprazole (PROTONIX) 40 mg in 0.9% sodium chloride (MBP/ADV) 50 mL MBP  8 mg/hr IntraVENous CONTINUOUS  
 sucralfate (CARAFATE) tablet 1 g  1 g Oral AC&HS  
 metoprolol tartrate (LOPRESSOR) tablet 50 mg  50 mg Oral Q12H  
 metoprolol (LOPRESSOR) injection 5 mg  5 mg IntraVENous Q4H PRN  
 sodium chloride (NS) flush 5-40 mL  5-40 mL IntraVENous Q8H  
 sodium chloride (NS) flush 5-40 mL  5-40 mL IntraVENous PRN  
 furosemide (LASIX) tablet 20 mg  20 mg Oral DAILY  hydrALAZINE (APRESOLINE) 20 mg/mL injection 10 mg  10 mg IntraVENous Q6H PRN  
 [Held by provider] aspirin chewable tablet 81 mg  81 mg Oral DAILY  therapeutic multivitamin (THERAGRAN) tablet 1 Tab  1 Tab Oral DAILY  folic acid (FOLVITE) tablet 1 mg  1 mg Oral DAILY  sodium chloride (NS) flush 5-40 mL  5-40 mL IntraVENous Q8H  
 sodium chloride (NS) flush 5-40 mL  5-40 mL IntraVENous PRN  
 acetaminophen (TYLENOL) tablet 650 mg  650 mg Oral Q6H PRN Or  
 acetaminophen (TYLENOL) suppository 650 mg  650 mg Rectal Q6H PRN  polyethylene glycol (MIRALAX) packet 17 g  17 g Oral DAILY PRN  promethazine (PHENERGAN) tablet 12.5 mg  12.5 mg Oral Q6H PRN  Or  
  ondansetron (ZOFRAN) injection 4 mg  4 mg IntraVENous Q6H PRN Lab/Data Review: 
Labs: Results:  
   
Chemistry Recent Labs 11/04/20 
0551 11/03/20 0055 11/02/20 
1110 GLU 88 97 115* * 144 143  
K 4.0 4.3 4.5  
* 112* 109 CO2 24 28 29 BUN 43* 44* 42* CREA 0.97 0.99 1.07  
BUCR 44* 44* 39* AGAP 8 4 5 CA 8.3* 7.9* 8.4* Recent Labs 11/04/20 
0551 11/03/20 0055 11/02/20 
1110 ALT 17 14* 13* TP 5.6* 5.8* 6.1* ALB 2.8* 2.7* 3.0*  
GLOB 2.8 3.1 3.1 AGRAT 1.0 0.9 1.0  
  
CBC w/Diff Recent Labs 11/04/20 
0551 11/03/20 
1830 11/03/20 
1611  11/03/20 0055 11/02/20 
1110 WBC 7.4  --   --   --  9.9 7.3  
RBC 2.86*  --   --   --  3.22* 3.72* HGB 8.8* 8.4* 9.0*   < > 10.0* 11.8* HCT 28.0* 26.2* 28.9*   < > 31.5* 36.7 MCV 97.9*  --   --   --  97.8* 98.7*  
MCH 30.8  --   --   --  31.1 31.7 MCHC 31.4  --   --   --  31.7 32.2 RDW 13.1  --   --   --  12.8 13.1   --   --   --  208 227 GRANS 69  --   --   --  75* 73 LYMPH 21  --   --   --  18* 18* EOS 2  --   --   --  0 0  
 < > = values in this interval not displayed. Coagulation No results for input(s): PTP, INR, APTT, INREXT, INREXT in the last 72 hours. Iron/Ferritin Lab Results Component Value Date/Time Iron 48 (L) 11/03/2020 12:55 AM  
 TIBC 232 (L) 11/03/2020 12:55 AM  
 Iron % saturation 21 11/03/2020 12:55 AM  
 Ferritin 63 11/03/2020 12:55 AM  
   
BNP Cardiac Enzymes Lab Results Component Value Date/Time CK 83 10/30/2020 08:14 AM  
 CK - MB 1.2 10/30/2020 08:14 AM  
 CK-MB Index 1.4 10/30/2020 08:14 AM  
 Troponin-I, QT 0.04 10/30/2020 08:14 AM  
 
  
Lactic Acid Thyroid Studies All Micro Results None Images: 
 
CT (Most Recent). CT Results (most recent): 
Results from Hospital Encounter encounter on 10/29/20 CT HEAD WO CONT Narrative CT OF THE BRAIN 
 
COMPARISON: None. INDICATIONS: Fall and memory loss. TECHNIQUE: Volumetric data acquisition was performed   through the brain on a 
multislice scanner and reconstructed in the axial plane. FINDINGS:   
 
The ventricles and CSF spaces are enlarged consistent with age associated 
atrophy. There is no midline shift. Othelia Carlos The brain parenchyma is of generally normal attenuation. There is decreased 
attenuation in the periventricular white matter consistent with presumed 
microvascular disease. There is no hemorrhage evident. There are no pathologic fluid collections. There are no pathologic calcifications. . 
. The visible portions of the paranasal sinuses: Are clear. Impression IMPRESSION:  
 
Atrophy and microvascular disease. No acute intracranial process. All CT scans at this facility are performed using dose optimization technique as 
appropriate to the performed exam, to include automated exposure control, 
adjustment of the mA and/or kV according to patient's size (Including 
appropriate matching for site-specific examinations), or use of iterative 
reconstruction technique. XRAY (Most Recent) EKG No results found for this or any previous visit. 2D ECHO 10/29/20 ECHO ADULT COMPLETE 10/31/2020 10/31/2020 Narrative · LV: Estimated LVEF is 40 - 45%. Visually measured ejection fraction. Normal cavity size. Mild concentric hypertrophy. · RV: Mildly dilated right ventricle. Mildly reduced systolic function. · PA: Mild pulmonary hypertension. Pulmonary arterial systolic pressure is 46 mmHg. · IVC: Moderately elevated central venous pressure (10-15 mmHg); IVC  
diameter is larger than 21 mm and collapses more than 50% with  
respiration.  
   
  Signed by: Anjel Hurtado MD

## 2020-11-04 NOTE — PROGRESS NOTES
Attempted OT tx at 80, pt currently confused with R soft wrist restraint intact and having difficulty following simple step commands. Pt not appropriate at this time. Will f/u as time permits. Thank you TAMI Calvin/CADE

## 2020-11-04 NOTE — PROGRESS NOTES
Nutrition Assessment Type and Reason for Visit: Reassess, Consult(Oral Nutrition Supplements) Nutrition Recommendations/Plan: - Advance to cardiac diet per discussion with MD. 
- Add supplements: Ensure Enlive, BID. - IVF per MD.  
 
Nutrition Assessment:  Full diet following EGD yesterday revealing duodenal ulcer with cautery, injection and bx. Fair to good intake this morning of liquids. MD agreeable to diet advancement to solids today. Malnutrition Assessment: 
Malnutrition Status: No malnutrition Estimated Daily Nutrient Needs: 
Energy (kcal):  2544-6185 Protein (g):  62-78 Fluid (ml/day):  1099-7750 Nutrition Related Findings:  Loose BM 11/3. Some confusion noted. Folic acid, iron, MVI and thiamine. Family adamant pt does not drink alcohol. Current Nutrition Therapies: DIET FULL LIQUID Anthropometric Measures: 
· Height:  5' 9\" (175.3 cm) · Current Body Wt:  78 kg (171 lb 15.3 oz) · BMI: 25.4 Nutrition Diagnosis:  
· Inadequate oral intake related to cognitive or neurological impairment, early satiety as evidenced by (full liquid diet restriction) Nutrition Intervention: 
Food and/or Nutrient Delivery: Continue current diet, Start oral nutrition supplement, Vitamin supplement, Mineral supplement Nutrition Education and Counseling: Education not indicated Coordination of Nutrition Care: Continue to monitor while inpatient Goals: 
PO nutrition intake will meet >75% of patient estimated nutritional needs within the next 7 days. Nutrition Monitoring and Evaluation:  
Behavioral-Environmental Outcomes: None identified Food/Nutrient Intake Outcomes: Diet advancement/tolerance, Food and nutrient intake, Supplement intake, Vitamin/mineral intake Physical Signs/Symptoms Outcomes: Biochemical data, Chewing or swallowing, Meal time behavior, Nutrition focused physical findings Discharge Planning: Too soon to determine Electronically signed by Sofia Alva RD on 11/4/2020 at 10:17 AM 
 
Contact Number: [phone]

## 2020-11-04 NOTE — PROGRESS NOTES
[facility]  -  Progress Note Patient: Shaun Joy MRN: [id number]  SSN: [id number] YOB: 1940  Age: 79 y.o. Sex: male Admit Date: 10/29/2020 Seen and independently examined. Agree with below. Patient remains in atrial fibrillation, rates reasonably well controlled on current dose of metoprolol. Volume status appears to be better. He denies any shortness of breath. We will continue his current dose of furosemide. Agree with starting low-dose ACE inhibitor as well. Patient will ultimately require an ischemic work-up, but this can be arranged as an outpatient. For now would treat without any anticoagulation due to new diagnosis of duodenal ulcer. No further inpatient cardiac work-up planned. Patti Byrne MD 
 
 
Assessment:  
 
-A.fib w/RVR, new diagnosis per patient 
-Cardiomyopathy, new, EF 40-45% by echo 10/31/20, patient unable to tolerate pharm nuc due to claustrophobic  
-Duodenal ulcer by EGD 11/3/20 
-Indeterminate troponin 0.05 max, not consistent with primary ACS  
-New acute systolic heart failure, elevated pro-BNP of 5543 at presentation with edema by CXR 
-Confusion, memory loss, ongoing workup -s/p fall about 3 weeks ago 
-HTN, new diagnosis 
  
No primary cardiologist or primary physician Plan:  
 
-Remains afib on monitor but HR ~100. Continued on PO Lopressor. 
-Suboptimal candidate for oral anticoagulation due to duodenal ulcer, recent fall. 
-Resume 81 mg ASA if okay from GI standpoint. 
-Continued on PO Lasix. 
-No further recommendations from cardiac standpoint at this time. Subjective: No new complaints. Feeling better. Objective:  
  
Patient Vitals for the past 8 hrs: 
 Temp Pulse Resp BP SpO2  
11/04/20 1033  (!) 105  (!) 118/41   
11/04/20 0722 97.1 °F (36.2 °C) (!) 110 23 120/71 98 % Patient Vitals for the past 96 hrs: 
 Weight 11/03/20 0401 74.3 kg (163 lb 14.4 oz) 11/02/20 0818 74.4 kg (164 lb) 11/02/20 0342 74.4 kg (164 lb 1.6 oz) 11/01/20 0400 76.7 kg (169 lb) 10/31/20 1118 78 kg (172 lb) Intake/Output Summary (Last 24 hours) at 11/4/2020 1116 Last data filed at 11/4/2020 7802 Gross per 24 hour Intake 120 ml Output 450 ml Net -330 ml Physical Exam: 
General:  alert, cooperative, no distress, appears stated age Neck:  supple Lungs:  clear to auscultation bilaterally Heart:  Irregularly irregular rhythm Abdomen:  abdomen is soft without significant tenderness, masses, organomegaly or guarding Extremities:  Bandage noted to R shin, no appreciable lower extreimty edema Data Review:  
 
Labs: Results:  
   
Chemistry Recent Labs 11/04/20 
0551 11/03/20 
0055 11/02/20 
1110 GLU 88 97 115* * 144 143  
K 4.0 4.3 4.5  
* 112* 109 CO2 24 28 29 BUN 43* 44* 42* CREA 0.97 0.99 1.07  
CA 8.3* 7.9* 8.4* AGAP 8 4 5 BUCR 44* 44* 39* AP 53 54 65  
TP 5.6* 5.8* 6.1* ALB 2.8* 2.7* 3.0*  
GLOB 2.8 3.1 3.1 AGRAT 1.0 0.9 1.0  
  
CBC w/Diff Recent Labs 11/04/20 
1013 11/04/20 
0551 11/03/20 
1830  11/03/20 
0055 11/02/20 
1110 WBC  --  7.4  --   --  9.9 7.3  
RBC  --  2.86*  --   --  3.22* 3.72* HGB 9.4* 8.8* 8.4*   < > 10.0* 11.8* HCT 30.0* 28.0* 26.2*   < > 31.5* 36.7 PLT  --  202  --   --  208 227 GRANS  --  69  --   --  75* 73 LYMPH  --  21  --   --  18* 18* EOS  --  2  --   --  0 0  
 < > = values in this interval not displayed. Lipid Panel Lab Results Component Value Date/Time Cholesterol, total 103 10/31/2020 01:53 AM  
 HDL Cholesterol 34 (L) 10/31/2020 01:53 AM  
 LDL, calculated 56.8 10/31/2020 01:53 AM  
 VLDL, calculated 12.2 10/31/2020 01:53 AM  
 Triglyceride 61 10/31/2020 01:53 AM  
 CHOL/HDL Ratio 3.0 10/31/2020 01:53 AM  
  
Liver Enzymes Recent Labs 11/04/20 
9077 TP 5.6* ALB 2.8* AP 53 Thyroid Studies Lab Results Component Value Date/Time TSH 2.45 10/29/2020 02:45 PM

## 2020-11-04 NOTE — PROGRESS NOTES
[email or web address] Index 
[phone] Gastroenterology follow up-Progress note Impression: 1. GI Bleed s/p EGD 11/3/2020 - antritis, duodenal ulcer with adherent clot, cauterized with bicap. 2. Anemia - improving, hgb 8.8 3. A-fib with RVR - lovenox started 11/1/2020 4. Cardiomyopathy - EF 40-45% by echo 10/31/2020 5. New acute systolic HF 6. Encephalopathy - ? Acute vs chronic, toxic or metabolic, ammonia 41, serum ETOH 9 on admission 10/30/2020 7. Hx hallucinations, memory loss 8. Hx recreational drug use Plan: 1. Continue IV PPI and carafate 2. Continue clear diet 3. Monitor for recurrent bleeding, if active bleed would consider IR embolization 4. Monitor h/h, transfuse per protocol 5. Medical management per primary team 
 
Chief Complaint: GI bleed Subjective:  Denies abdominal pain, no further bleeding ROS: Denies any fevers, chills, rash. Eyes: conjunctiva normal, EOM normal  
Neck: ROM normal, supple and trachea normal  
Cardiovascular: heart normal, intact distal pulses, normal rate and regular rhythm Pulmonary/Chest Wall: breath sounds normal and effort normal  
Abdominal: appearance normal, bowel sounds normal and soft, non-acute, non-tender Patient Active Problem List  
Diagnosis Code  Atrial fibrillation with rapid ventricular response (HCC) I48.91  
 Acute lower gastrointestinal hemorrhage K92.2 Visit Vitals BP (!) 118/41 Pulse (!) 105 Temp 97.1 °F (36.2 °C) Resp 23 Ht 5' 9\" (1.753 m) Wt 74.3 kg (163 lb 14.4 oz) SpO2 98% BMI 24.20 kg/m² Intake/Output Summary (Last 24 hours) at 11/4/2020 1118 Last data filed at 11/4/2020 8112 Gross per 24 hour Intake 120 ml Output 450 ml Net -330 ml CBC w/Diff Lab Results Component Value Date/Time  WBC 7.4 11/04/2020 05:51 AM  
 RBC 2.86 (L) 11/04/2020 05:51 AM  
 HGB 9.4 (L) 11/04/2020 10:13 AM  
 HCT 30.0 (L) 11/04/2020 10:13 AM  
 MCV 97.9 (H) 11/04/2020 05:51 AM  
 MCH 30.8 11/04/2020 05:51 AM  
 MCHC 31.4 11/04/2020 05:51 AM  
 RDW 13.1 11/04/2020 05:51 AM  
  11/04/2020 05:51 AM  
 Lab Results Component Value Date/Time GRANS 69 11/04/2020 05:51 AM  
 LYMPH 21 11/04/2020 05:51 AM  
 EOS 2 11/04/2020 05:51 AM  
 BASOS 1 11/04/2020 05:51 AM  
  
Basic Metabolic Profile Recent Labs 11/04/20 
0029 * K 4.0  
* CO2 24 BUN 43*  
CA 8.3* Hepatic Function Lab Results Component Value Date/Time ALB 2.8 (L) 11/04/2020 05:51 AM  
 TP 5.6 (L) 11/04/2020 05:51 AM  
 AP 53 11/04/2020 05:51 AM  
 No results found for: TBIL Coags No results for input(s): PTP, INR, APTT, INREXT in the last 72 hours. [facility]. [email or web address] Phone: [phone] Pager: [phone]

## 2020-11-04 NOTE — PROGRESS NOTES
4968: RN spoke to [facility] in regards to pt's status. Daughter stated she has not received any updates on her father's status and all three daughters are included in his care, per her conversation with nursing supervisor yesterday. RN stated we can create a schedule to communicate updates. Christina Gordon agreed to this plan to get a call with pt updates on plan of care around 1000 in the mornings. Daughter stated that she is off today and can be the best means of  contact for today. She questioned the results of pt's blood test. RN stated she cannot give any results, if the MD has not already communicated the results to her first. Daughter was concerned for pt being given alcohol detox medications when she has the understanding that he is not an alcoholic. RN communicated pt was given ativan two days ago and has not been given any agitation medications since. Daughter understands pt is currently restrained for his safety and due to his confusion. RN completed MRI screening sheet with daughter via phone call, two RN verification. Christina Gordon was able to speak on the phone with her father at the bedside. All questions and concerns were answered to the best of RN's ability. Daughter was pleased with this interaction and stated she will be awaiting phone call from MD. RN to communicate to [place] [place] to set up a potential visitation with patient.

## 2020-11-04 NOTE — PROGRESS NOTES
conducted a Follow up consultation and Spiritual Assessment for Jabari Fuller, who is a 79 y.o.,male. The  provided the following Interventions: 
 was unable to assess patient during doctor's visit. Plan: 
Chaplains will continue to follow and will provide pastoral care on an as needed/requested basis.  recommends bedside caregivers page  on duty if patient shows signs of acute spiritual or emotional distress.  [facility]  
[phone]
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
## 2020-11-05 NOTE — PROGRESS NOTES
Palliative Medicine Pt does not have an Advance Medical Directive (AMD) on file in EMR. Pt is willing and able to complete AMD today. Pt named his daughter, Brodie Awad as primary healthcare decision maker and his other daughter, Clemente Omer as secondary. Primary Decision Maker (Health Care Agent): Brodie Awad Relationship to patient: daughter Phone number: [phone] email [email or web address] [x] Named in a scanned document  
[] Legal Next of Kin 
[] Guardian Secondary Decision Maker ([address]): Clemente Omer Relationship to patient: daughter Phone number:  [phone] [x] Named in a scanned document  
[] Legal Next of Kin 
[] Guardian ACP documents you currently have include: 
[x] Advance Directive or Living Will  
[] Durable Do Not Resuscitate 
[] Physician Orders for Scope of Treatment (POST) [] Medical Power of  
[] Other - None Palliative Team members Demetrius Coyle NP and this writer met with Mr. Baron Angeles, he is sitting up in chair. He is consistent with who he trusts to make his healthcare decisions. Words of support provided to Mr. Baron Angeles when he spoke of his children Pt was willing and able to complete AMD as noted above. Copies of AMD placed in chart for scanning into EMR. Palliative team placed call to pts daughter Brodie Awad to update her on pts completion of AMD and naming her and her sister as Healthcare decision makers. Reintroduced the need for a conversation between the sisters to determine the next steps/code status for their father. Ms. Vin Erazo was informed that due to her fathers chronic health conditions CPR would not return him to his prior level of function or quality of life. Palliative team emailed MS. Garduno copy of AMD along with CRP and Breathing Facts sheets. Palliative team will continue to follow for support and goals of care decisions. Goals of Care- FULL CODE/FULL INTERVENTIONS. Marleny Miguel BSN, RN, [facility] Palliative Medicine Inpatient RN [facility] Line: [phone] ([phone])

## 2020-11-05 NOTE — PROGRESS NOTES
Pt does not have IV access - he keeps pulling out his IV Lines - will switch protonix to PO 40mg BID Will continue to follow

## 2020-11-05 NOTE — PROGRESS NOTES
10: 40-Pt not seen for skilled PT due to: 
 
[ ]  Nausea/vomiting [ ]  Eating [ ]  Pain [ ]  Pt lethargic [x ]  Off Unit- x2 attempt 11:41 Other: Clinicians in room speaking with patient Will f/u later as schedule allows. Thank you.  
Lawanda Brantley, PT, DPT

## 2020-11-05 NOTE — CONSULTS
A 67-year-old male with a PMHx of HTN  Was admitted to the hospital for altered mental status. Hx mainly from medical records. He was brought to the emergency room on October 29, 2020 for progressive changes in his mental status. He was noticed to have changes in his memory which appears acute. He has a recent fall where he hit his face and arm. He is moving his arms and legs symmetrically. No abnormal body movement. There is report that he has been seeing things. Patient is unable to give any history at this point. He does not actually know the reason he was brought to the hospital.  He does not have any fever. No leukocytosis. CMP unremarkable. Ammonia level in normal range. CT scan of the head which did not show any acute changes but there is some microvascular ischemic changes. After admission, he was found to have A. fib with RVR. TTE showed an ejection fraction of 40 to 45%. He was initially put on diltiazem drip and subsequently on p.o. metoprolol and Lasix. MRI of the brain showed no acute changes; showed bilateral basal ganglia iron deposition. Social History Socioeconomic History  Marital status: LEGALLY  Spouse name: Not on file  Number of children: Not on file  Years of education: Not on file  Highest education level: Not on file Occupational History  Not on file Social Needs  Financial resource strain: Not on file  Food insecurity Worry: Not on file Inability: Not on file  Transportation needs Medical: Not on file Non-medical: Not on file Tobacco Use  Smoking status: Not on file Substance and Sexual Activity  Alcohol use: Not on file  Drug use: Not on file  Sexual activity: Not on file Lifestyle  Physical activity Days per week: Not on file Minutes per session: Not on file  Stress: Not on file Relationships  Social connections Talks on phone: Not on file Gets together: Not on file Attends Sabianist service: Not on file Active member of club or organization: Not on file Attends meetings of clubs or organizations: Not on file Relationship status: Not on file  Intimate partner violence Fear of current or ex partner: Not on file Emotionally abused: Not on file Physically abused: Not on file Forced sexual activity: Not on file Other Topics Concern  Not on file Social History Narrative  Not on file History reviewed. No pertinent family history. Current Facility-Administered Medications Medication Dose Route Frequency Provider Last Rate Last Dose  pantoprazole (PROTONIX) tablet 40 mg  40 mg Oral BID Ran Paul MD   40 mg at 11/05/20 5628  thiamine HCL (B-1) tablet 100 mg  100 mg Oral DAILY Arcelia Wong MD   100 mg at 11/05/20 6873  tamsulosin (FLOMAX) capsule 0.4 mg  0.4 mg Oral QHS Arcelia Wong MD      
 ciprofloxacin HCl (CIPRO) tablet 500 mg  500 mg Oral Q12H Shanti Nunez MD      
 sucralfate (CARAFATE) tablet 1 g  1 g Oral AC&HS Mariana Wilkinson MD   1 g at 11/05/20 1256  metoprolol tartrate (LOPRESSOR) tablet 50 mg  50 mg Oral Q12H Shawnee Jacob PA   50 mg at 11/05/20 0922  
 metoprolol (LOPRESSOR) injection 5 mg  5 mg IntraVENous Q4H PRN Shawnee Jacob PA      
 sodium chloride (NS) flush 5-40 mL  5-40 mL IntraVENous Q8H Ran Paul MD   10 mL at 11/05/20 1400  
 sodium chloride (NS) flush 5-40 mL  5-40 mL IntraVENous PRN Ran Paul MD      
 [Held by provider] furosemide (LASIX) tablet 20 mg  20 mg Oral DAILY Mateo Terry MD   Stopped at 11/05/20 0900  hydrALAZINE (APRESOLINE) 20 mg/mL injection 10 mg  10 mg IntraVENous Q6H PRN Claude Oreilly DO   10 mg at 10/31/20 0162  [Held by provider] aspirin chewable tablet 81 mg  81 mg Oral DAILY Alicia Bettencourt MD   81 mg at 11/02/20 1028  therapeutic multivitamin (THERAGRAN) tablet 1 Tab  1 Tab Oral DAILY Lv Llamas MD   1 Tab at 11/05/20 6735  folic acid (FOLVITE) tablet 1 mg  1 mg Oral DAILY Lv Llamas MD   1 mg at 11/05/20 9630  sodium chloride (NS) flush 5-40 mL  5-40 mL IntraVENous Q8H Faustino Casey [place]   10 mL at 11/05/20 1400  
 sodium chloride (NS) flush 5-40 mL  5-40 mL IntraVENous PRN MariselBrissa Griffin      
 acetaminophen (TYLENOL) tablet 650 mg  650 mg Oral Q6H PRN Marisel Alicia T, [place]  
 acetaminophen (TYLENOL) suppository 650 mg  650 mg Rectal Q6H PRN Al Faustino Mercedes PA      
 polyethylene glycol (MIRALAX) packet 17 g  17 g Oral DAILY PRN BERLIN Han      
 promethazine (PHENERGAN) tablet 12.5 mg  12.5 mg Oral Q6H PRN MariselMichelle Griffinma Or  
 ondansetron (ZOFRAN) injection 4 mg  4 mg IntraVENous Q6H PRN Mariselmandy HORTON [place] Past Medical History:  
Diagnosis Date  Ill-defined condition   
 mild memory loss with hallucinations History reviewed. No pertinent surgical history. No Known Allergies Patient Active Problem List  
Diagnosis Code  Atrial fibrillation with rapid ventricular response (HCC) I48.91  
 Acute lower gastrointestinal hemorrhage K92.2 Review of Systems:  
Unable to obtain due to his clinical condition. PHYSICAL EXAMINATION:   
 
VITAL SIGNS:   
Visit Vitals /68 (BP 1 Location: Left arm, BP Patient Position: At rest) Pulse (!) 109 Temp 98.2 °F (36.8 °C) Resp 24 Ht 5' 9\" (1.753 m) Wt 69.6 kg (153 lb 8 oz) SpO2 99% BMI 22.67 kg/m² GENERAL: In no apparent distress. EXTREMITIES: Muscle tone is normal. 
HEAD:   The patient is normocephalic. NEUROLOGIC EXAMINATION Mental status: Awake, alert, oriented self and presidnet; not to the place, month, day/date, and year; follows simple commands;  no neglect; no gaze deviation. No neck stiffness. Speech and languge: fluent, slight incoherence, and follows simple commands. CN: BTT bilaterally;  EOMI, with no gaze deviation; PERRLA,  no facial asymmetry noted, palate elevation symmetric bilat, SS+SCM 5/5 bilat, tongue midline Motor: no pronator drift, tone normal throughout, symmetric UE and LE movement. Sensory: intact to light touch throughout Coordination: FNF w/o dysmetria; no tremor. DTR: 2+ throughout, toes downgoing BL Gait: not tested Result Information Status: Final result (Exam End: 11/4/2020 15:19)  Provider Status: Open Study Result Brain MR without contrast 
  
HISTORY: Confusion, memory loss, hallucinations and falls. 
  
COMPARISON: CT 10/29/2020 
  
TECHNIQUE: Brain scanned with sagittal and axial T1W scans, axial T2W , axial 
FLAIR, axial diffusion weighted images and SWAN.   
FINDINGS: Details are limited by mild-to-moderate motion artifact. 
  
Cerebral parenchyma: No restricted diffusion abnormalities to suggest acute 
stroke. No evidence of edema, mass effect or mass lesion. No significant T2 or FLAIR hyperintense abnormalities. Prominent symmetric susceptibility hypointense 
signal in the basal ganglia. 
  
Brain volumes and ventricular system: Normal in size and morphology for the 
patient's age. 
  
Midline structures: Normal. 
  
Cerebellum: Normal. 
  
Brainstem: Normal. 
  
Vascular system: Expected arterial flow voids are present at the base of brain. 
  
Calvarium and skull base: Normal. 
  
Paranasal sinuses and mastoid air cells: Essentially clear. Couple of tiny mucus 
retention cysts in the maxillary sinuses. 
  
Visualized orbits: Unremarkable for a nondedicated exam. 
  
Visualized upper cervical spine: Unremarkable for a nondedicated exam. 
  
IMPRESSION IMPRESSION: 
  
1. No acute brain abnormalities. 2.  Symmetric prominent susceptibility artifact in the basal ganglia suggesting 
increased iron/mineral deposition. CBC:  
Lab Results Component Value Date/Time  WBC 9.3 11/05/2020 04:02 AM  
 RBC 2.84 (L) 11/05/2020 04:02 AM  
 HGB 8.7 (L) 11/05/2020 05:19 PM  
 HCT 27.3 (L) 11/05/2020 05:19 PM  
 PLATELET 817 52/11/7925 04:02 AM  
 
BMP:  
Lab Results Component Value Date/Time Glucose 121 (H) 11/05/2020 04:02 AM  
 Sodium 143 11/05/2020 04:02 AM  
 Potassium 3.7 11/05/2020 04:02 AM  
 Chloride 112 (H) 11/05/2020 04:02 AM  
 CO2 23 11/05/2020 04:02 AM  
 BUN 38 (H) 11/05/2020 04:02 AM  
 Creatinine 1.37 (H) 11/05/2020 04:02 AM  
 Calcium 8.5 11/05/2020 04:02 AM  
 
CMP:  
Lab Results Component Value Date/Time Glucose 121 (H) 11/05/2020 04:02 AM  
 Sodium 143 11/05/2020 04:02 AM  
 Potassium 3.7 11/05/2020 04:02 AM  
 Chloride 112 (H) 11/05/2020 04:02 AM  
 CO2 23 11/05/2020 04:02 AM  
 BUN 38 (H) 11/05/2020 04:02 AM  
 Creatinine 1.37 (H) 11/05/2020 04:02 AM  
 Calcium 8.5 11/05/2020 04:02 AM  
 Anion gap 8 11/05/2020 04:02 AM  
 BUN/Creatinine ratio 28 (H) 11/05/2020 04:02 AM  
 Alk. phosphatase 53 11/05/2020 04:02 AM  
 Protein, total 6.1 (L) 11/05/2020 04:02 AM  
 Albumin 3.0 (L) 11/05/2020 04:02 AM  
 Globulin 3.1 11/05/2020 04:02 AM  
 A-G Ratio 1.0 11/05/2020 04:02 AM  
 
Cardiac markers:  
Lab Results Component Value Date/Time CK 83 10/30/2020 08:14 AM  
 CK-MB Index 1.4 10/30/2020 08:14 AM  
 
 
Impression/Plan: A 79years old male patient with above medical problems was admitted to the hospital for altered mental status, acute mood changes, fall. Found to have new A. fib with RVR. Patient does not have any focal neuro deficits. No documented history of dementia in the past.  MRI of the brain did not show any acute changes. No significant atrophy. It showed ?bilateral basal ganglia susceptibility artifact which was thought to be from itron vs other mineral deposition. The finding is non specific.  BG iron deposition can be seen in neuro degenerative disorders which might present with movement disorders and other cognitive dysfunctions in younger age group. No BG calcification on CT scan. The findings on MRI do not explain his clinical condition. Might present with progressive movement disorders which he doesn't have. Might have underlying mild dementia which out manifested with the acute illness. The encephalopathy could be from metabolic, infectious, or the Afib with decompensation. Call for questions. PLEASE NOTE:  
This document has been produced using voice recognition software. Unrecognized errors in transcription may be present.
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
## 2020-11-05 NOTE — ROUTINE PROCESS
Bedside shift change report given to LU Chirinos (oncoming nurse) by Shaun Khan RN (offgoing nurse). Report included the following information SBAR, Kardex, Intake/Output, MAR, Recent Results and Cardiac Rhythm A fib.

## 2020-11-05 NOTE — PROGRESS NOTES
Problem: Mobility Impaired (Adult and Pediatric) Goal: *Acute Goals and Plan of Care (Insert Text) Description: Physical Therapy Goals Initiated 11/1/2020 and to be accomplished within 7 day(s) 1. Patient will move from supine to sit and sit to supine  in bed with modified independence. 2.  Patient will transfer from bed to chair and chair to bed with modified independence using the least restrictive device. 3.  Patient will perform sit to stand with modified independence. 4.  Patient will ambulate with modified independence for 200 feet with the least restrictive device. 5.  Patient will ascend/descend 4 stairs with  handrail(s) with modified independence. PLOF: Patient reports he was independent with self care and functional mobility. He lives alone in single story home with 4-5 MARGUERITE and B HR. Outcome: Progressing Towards Goal 
  
PHYSICAL THERAPY TREATMENT Patient: Claudio Marin (1 y.o. male) Date: 11/5/2020 Diagnosis: Atrial fibrillation with rapid ventricular response ([address] [I48.91] Atrial fibrillation with rapid ventricular response ([address] Procedure(s) (LRB): ENDOSCOPY w cautery w injection w bx's (N/A) 2 Days Post-Op Precautions: Fall ASSESSMENT: 
RN cleared for PT to work with pt. Pt found supine in bed, sitting in room, on room air, confused but willing to work with PT. He is only oriented to his name and is confused throughout PT session. Pt needs hand-over-hand cueing at times for mobility, using RW, and directions for where to walk. Pt performed supine-sit with SBA. BP sitting 88/52, no reports of dizziness. BP retaken ~2 minutes later- 88/52. Pt stood with RW, BP 94/54. Pt then amb 20 feet around room with RW and min at times for navigating walker. At times, pt did not cue to his right side and tended to bump the RW into objects. He needed max cueing and hand-overhand cueing for getting in front of the bed to sit down.  Once sitting, pt performed exercises per flow sheet. Pt back supine in bed, call bell nearby, sitter in room, NAD. Recommend SNF at this time. Progression toward goals:  
[]      Improving appropriately and progressing toward goals [x]      Improving slowly and progressing toward goals 
[]      Not making progress toward goals and plan of care will be adjusted PLAN: 
Patient continues to benefit from skilled intervention to address the above impairments. Continue treatment per established plan of care. Discharge Recommendations:  Acosta Salgado Further Equipment Recommendations for Discharge:  N/A  
 
SUBJECTIVE:  
Patient stated it's good when there's nothing wrong! Kaela De La Rosa OBJECTIVE DATA SUMMARY:  
Critical Behavior: 
Neurologic State: Alert, Confused Orientation Level: Oriented to person, Disoriented to place, Disoriented to situation, Disoriented to time Cognition: Impaired decision making, Poor safety awareness Safety/Judgement: Fall prevention, Lack of insight into deficits Functional Mobility Training: 
Bed Mobility: 
  
Supine to Sit: Stand-by assistance Scooting: Stand-by assistance Transfers: 
Sit to Stand: Stand-by assistance Stand to Sit: Stand-by assistance Balance: 
Sitting: Intact; Without support Standing: Impaired; With support Standing - Static: Fair Standing - Dynamic : Fair Ambulation/Gait Training: 
Distance (ft): 20 Feet (ft) Assistive Device: Walker, rolling Ambulation - Level of Assistance: Minimal assistance Gait Abnormalities: Decreased step clearance Base of Support: Narrowed Speed/Lilia: Shuffled Step Length: Right shortened;Left shortened Therapeutic Exercises: Pt performed exercises per flow sheet sitting EOB EXERCISE Sets Reps Active Active Assist  
Passive Self ROM Comments Ankle Pumps    [] [] [] [] Quad Sets/Glut Sets    [] [] [] [] Hold for 5 secs Hamstring Sets   [] [] [] [] Short Arc Quads   [] [] [] [] Heel Slides   [] [] [] [] Straight Leg Raises   [] [] [] [] Hip Add   [] [] [] [] Hold for 5 secs, w/ pillow squeeze Long Arc Quads 2 15 [x] [] [] [] Seated Marching 2 15 [x] [] [] []   
Standing Marching   [] [] [] []   
   [] [] [] []   
 
 
Pain: 
Pain level pre-treatment: 0/10 Pain level post-treatment: 0/10 Pain Intervention(s): Medication (see MAR); Rest, Repositioning Response to intervention: Nurse notified, See doc flow Activity Tolerance:  
Pt tolerated mobility fair Please refer to the flowsheet for vital signs taken during this treatment. After treatment:  
[] Patient left in no apparent distress sitting up in chair 
[x] Patient left in no apparent distress in bed 
[x] Call bell left within reach [x] Nursing notified 
[x] Caregiver present 
[] Bed alarm activated 
[] SCDs applied COMMUNICATION/EDUCATION:  
[x]         Role of Physical Therapy in the acute care setting. [x]         Fall prevention education was provided and the patient/caregiver indicated understanding. [x]         Patient/family have participated as able in working toward goals and plan of care. []         Patient/family agree to work toward stated goals and plan of care. []         Patient understands intent and goals of therapy, but is neutral about his/her participation. []         Patient is unable to participate in stated goals/plan of care: ongoing with therapy staff. 
[]         Other: 
 
   
Hermann Cruz Time Calculation: 26 mins

## 2020-11-05 NOTE — PROGRESS NOTES
8686: Bedside shift change report given to Candis RN (oncoming nurse) by Yohannes Guthrie RN and cAe Navarro RN (offgoing nurse). Report included the following information SBAR, Kardex, Intake/Output, MAR, Recent Results and Cardiac Rhythm A fib. Pt restless, confused, and getting out of bed throughout the night. High fall risk. Pt pulled PIV line, so pt is in need of new access. Unable to be obtained by nursing staff. Pt's protonix gtt transitioned to PO.

## 2020-11-05 NOTE — PROGRESS NOTES
[facility]  -  Progress Note Patient: Gagandeep Brantley MRN: [id number]  SSN: [id number] YOB: 1940  Age: 79 y.o. Sex: male Admit Date: 10/29/2020 Patient seen and examined independently. Patient very confused this morning. As noted below, the patient was not candidate for anticoagulation at this time. Serum creatinine increased to 1.37. Agree with hold of Lasix at present. AF ventricular response in the 100 teens although patient is sitting up and agitated at this point. Will sign off at this point and be available. Cornel Marquez MD 
Assessment:  
 
-A.fib w/RVR, new diagnosis per patient 
-Cardiomyopathy, new, EF 40-45% by echo 10/31/20, patient unable to tolerate pharm nuc due to claustrophobic  
-Duodenal ulcer by EGD 11/3/20 
-Indeterminate troponin 0.05 max, not consistent with primary ACS  
-New acute systolic heart failure, elevated pro-BNP of 5543 at presentation with edema by CXR 
-Confusion, memory loss, ongoing workup -s/p fall about 3 weeks ago 
-HTN, new diagnosis 
  
No primary cardiologist or primary physician Plan:  
 
-Continued on PO Metoprolol, rates overall reasonable in this setting.   
-Pt with bloody bowel movement noted this morning. Appreciate ongoing assistance of GI team. 
-Not a candidate for anticoagulation at this time. 
-Will defer initiation of ACEi given borderline BP, also with DARRYN this AM.  PO Lasix on hold per primary team. 
-No further cardiac workup planned in inpatient setting. Will be available as needed if additional concerns arise. Subjective:  
 
Restless/confused overnight, large bloody bowel movement this AM. Objective:  
  
Patient Vitals for the past 8 hrs: 
 Temp Pulse Resp BP SpO2  
11/05/20 0922  (!) 115  (!) 102/54   
11/05/20 0748 98.3 °F (36.8 °C) (!) 109 20 106/70 95 % 11/05/20 0421 97.4 °F (36.3 °C) (!) 109 18 120/72 96 % Patient Vitals for the past 96 hrs: 
 Weight 11/05/20 0421 69.6 kg (153 lb 8 oz) 11/03/20 0401 74.3 kg (163 lb 14.4 oz) 11/02/20 0818 74.4 kg (164 lb) 11/02/20 0342 74.4 kg (164 lb 1.6 oz) Intake/Output Summary (Last 24 hours) at 11/5/2020 1035 Last data filed at 11/5/2020 2792 Gross per 24 hour Intake 974 ml Output 130 ml Net 844 ml Physical Exam: 
General:  alert, cooperative, no distress, appears stated age Neck:  supple Lungs:  clear to auscultation bilaterally Heart:  Irregularly irregular rhythm Abdomen:  abdomen is soft without significant tenderness, masses, organomegaly or guarding Extremities:  Atraumatic, no edema Data Review:  
 
Labs: Results:  
   
Chemistry Recent Labs 11/05/20 
0402 11/04/20 
4928 11/03/20 
2466 * 88 97  147* 144  
K 3.7 4.0 4.3 * 115* 112* CO2 23 24 28 BUN 38* 43* 44* CREA 1.37* 0.97 0.99 CA 8.5 8.3* 7.9*  
MG 2.4  --   --   
PHOS 3.7  --   --   
AGAP 8 8 4 BUCR 28* 44* 44* AP 53 53 54  
TP 6.1* 5.6* 5.8* ALB 3.0* 2.8* 2.7*  
GLOB 3.1 2.8 3.1 AGRAT 1.0 1.0 0.9 CBC w/Diff Recent Labs 11/05/20 
0318 11/05/20 
0402 11/04/20 
2030  11/04/20 
0551  11/03/20 
0055 WBC  --  9.3  --   --  7.4  --  9.9  
RBC  --  2.84*  --   --  2.86*  --  3.22* HGB 8.8* 8.7* 8.2*   < > 8.8*   < > 10.0* HCT 27.5* 28.1* 25.4*   < > 28.0*   < > 31.5* PLT  --  221  --   --  202  --  208 GRANS  --  69  --   --  69  --  75* LYMPH  --  21  --   --  21  --  18* EOS  --  1  --   --  2  --  0  
 < > = values in this interval not displayed. Lipid Panel Lab Results Component Value Date/Time Cholesterol, total 103 10/31/2020 01:53 AM  
 HDL Cholesterol 34 (L) 10/31/2020 01:53 AM  
 LDL, calculated 56.8 10/31/2020 01:53 AM  
 VLDL, calculated 12.2 10/31/2020 01:53 AM  
 Triglyceride 61 10/31/2020 01:53 AM  
 CHOL/HDL Ratio 3.0 10/31/2020 01:53 AM  
  
Liver Enzymes Recent Labs 11/05/20 
0402 TP 6.1* ALB 3.0* AP 53 Thyroid Studies Lab Results Component Value Date/Time  TSH 2.45 10/29/2020 02:45 PM

## 2020-11-05 NOTE — PROGRESS NOTES
[email or web address] 
[phone] Gastroenterology follow up-Progress note Impression: 1. GI Bleed s/p EGD 11/3/2020 - antritis, duodenal ulcer with adherent clot, cauterized with bicap. Continued dark stools but hgb stable 2. Anemia - improving, hgb 8.8 3. A-fib with RVR - lovenox started 11/1/2020 4. Cardiomyopathy - EF 40-45% by echo 10/31/2020 5. New acute systolic HF 6. Encephalopathy - ? Acute vs chronic, toxic or metabolic, ammonia 41, serum ETOH 9 on admission 10/30/2020 7. Hx hallucinations, memory loss 8. Hx recreational drug use Plan: 1. Continue IV PPI and carafate 2. Continue clear diet 3. Monitor for recurrent bleeding, if active bleed would consider IR embolization 4. Monitor h/h, transfuse per protocol 5. Medical management per primary team 
 
Chief Complaint: GI bleed Subjective:  Denies abdominal pain, continued dark stools per nursing staff, ? Residual given findings on EGD. Eyes: conjunctiva normal, EOM normal  
Neck: ROM normal, supple and trachea normal  
Cardiovascular: heart normal, intact distal pulses, normal rate and regular rhythm Pulmonary/Chest Wall: breath sounds normal and effort normal  
Abdominal: appearance normal, bowel sounds normal and soft, non-acute, non-tender Patient Active Problem List  
Diagnosis Code  Atrial fibrillation with rapid ventricular response ([facility]) I48.91  
 Acute lower gastrointestinal hemorrhage K92.2 Visit Vitals BP (!) 102/54 Pulse (!) 115 Temp 98.3 °F (36.8 °C) Resp 20 Ht 5' 9\" (1.753 m) Wt 69.6 kg (153 lb 8 oz) SpO2 95% BMI 22.67 kg/m² Intake/Output Summary (Last 24 hours) at 11/5/2020 1030 Last data filed at 11/5/2020 7751 Gross per 24 hour Intake 974 ml Output 130 ml Net 844 ml CBC w/Diff Lab Results Component Value Date/Time  WBC 9.3 11/05/2020 04:02 AM  
 RBC 2.84 (L) 11/05/2020 04:02 AM  
 HGB 8.8 (L) 11/05/2020 07:36 AM  
 HCT 27.5 (L) 11/05/2020 07:36 AM  
 MCV 98.9 (H) 11/05/2020 04:02 AM  
 MCH 30.6 11/05/2020 04:02 AM  
 MCHC 31.0 11/05/2020 04:02 AM  
 RDW 13.4 11/05/2020 04:02 AM  
  11/05/2020 04:02 AM  
 Lab Results Component Value Date/Time GRANS 69 11/05/2020 04:02 AM  
 LYMPH 21 11/05/2020 04:02 AM  
 EOS 1 11/05/2020 04:02 AM  
 BASOS 0 11/05/2020 04:02 AM  
  
Basic Metabolic Profile Recent Labs 11/05/20 
0402   
K 3.7 * CO2 23 BUN 38*  
CA 8.5 MG 2.4 PHOS 3.7 Hepatic Function Lab Results Component Value Date/Time ALB 3.0 (L) 11/05/2020 04:02 AM  
 TP 6.1 (L) 11/05/2020 04:02 AM  
 AP 53 11/05/2020 04:02 AM  
 No results found for: TBIL Coags No results for input(s): PTP, INR, APTT, INREXT in the last 72 hours. [facility]. [email or web address] Phone: [phone] Pager: [phone]

## 2020-11-05 NOTE — PROGRESS NOTES
OT attempted 2x, pt ANGELICA for testing this and meeting w/palliative team this pm. 
 
Will continue to follow.

## 2020-11-06 NOTE — PROGRESS NOTES
Problem: Mobility Impaired (Adult and Pediatric) Goal: *Acute Goals and Plan of Care (Insert Text) Description: Physical Therapy Goals Initiated 11/1/2020 and to be accomplished within 7 day(s) 1. Patient will move from supine to sit and sit to supine  in bed with modified independence. 2.  Patient will transfer from bed to chair and chair to bed with modified independence using the least restrictive device. 3.  Patient will perform sit to stand with modified independence. 4.  Patient will ambulate with modified independence for 200 feet with the least restrictive device. 5.  Patient will ascend/descend 4 stairs with  handrail(s) with modified independence. PLOF: Patient reports he was independent with self care and functional mobility. He lives alone in single story home with 4-5 MARGUERITE and B HR. Outcome: Progressing Towards Goal 
  
PHYSICAL THERAPY TREATMENT Patient: Sravani Hannah (82 y.o. male) Date: 11/6/2020 Diagnosis: Atrial fibrillation with rapid ventricular response ([address] [I48.91] Atrial fibrillation with rapid ventricular response ([address] Procedure(s) (LRB): ENDOSCOPY w cautery w injection w bx's (N/A) 3 Days Post-Op Precautions: Fall ASSESSMENT: 
RN cleared for PT to be seen. Pt seen with both PT and OT to maximize patients safety, mobility, and participation. Pt found sitting up in recliner, on RA, in NAD, sitter in room. Pt reports he feels the best he has been since being here. Able to state he is in the hospital and that his family is coming to see him. Pt stood from chair with CGA and RW and ambulated 120 feet Bran in hallways, 1 slight LOB when returning back to room with turn but able to catch himself. Once back sitting in chair, pt performed exercises per flow sheet.  Pt then performed x5 sit to stands, working on hand placement with progressively decreased feedback on each trial. Patient is still unable to carry over sequencing to simple tasks like where to put his hands on the walker and arm rest. No carry over from trial to trial, pt needed hand-over hand cueing for this tasks. Pt is able to perform other tasks, however, independently without cueing (putting on his masks, walking and turning around). Pt left sitting up in recliner, call bell nearby, sitter in room, chair alarm on. Recommend  with 24/7 supervision/assist vs rehab. Progression toward goals:  
[]      Improving appropriately and progressing toward goals [x]      Improving slowly and progressing toward goals 
[]      Not making progress toward goals and plan of care will be adjusted PLAN: 
Patient continues to benefit from skilled intervention to address the above impairments. Continue treatment per established plan of care. Discharge Recommendations:  Home Health with 24/7 assist/supervision vs rehab Further Equipment Recommendations for Discharge:  rolling walker SUBJECTIVE:  
Patient stated I know I'm in the hospital. OBJECTIVE DATA SUMMARY:  
Critical Behavior: 
Neurologic State: Alert, Confused Orientation Level: Oriented to person, Oriented to place Cognition: Impaired decision making Safety/Judgement: Lack of insight into deficits Functional Mobility Training: 
Bed Mobility: 
 
Scooting: Stand-by assistance Transfers: 
Sit to Stand: Contact guard assistance Stand to Sit: Stand-by assistance Balance: 
Sitting: Intact; Without support Standing: Impaired; With support Standing - Static: Fair Standing - Dynamic : Fair;Occasional 
 Ambulation/Gait Training: 
Distance (ft): 120 Feet (ft) Assistive Device: Walker, rolling Ambulation - Level of Assistance: Minimal assistance Gait Abnormalities: Decreased step clearance Base of Support: Narrowed Speed/Lilia: Pace decreased (<100 feet/min) Step Length: Right shortened;Left shortened Neuro Re-Education: Pt performed sit to stands with verbal cueing for hand placement on hand rest and RW, progressively decreased verbal cueing to assess carry over. Pt still unable to process sequencing tasks Therapeutic Exercises: Pt performed exercises per flow sheet EXERCISE Sets Reps Active Active Assist  
Passive Self ROM Comments Ankle Pumps    [] [] [] [] Quad Sets/Glut Sets    [] [] [] [] Hold for 5 secs Hamstring Sets   [] [] [] [] Short Arc Quads   [] [] [] [] Heel Slides   [] [] [] [] Straight Leg Raises   [] [] [] [] Hip Add   [] [] [] [] Hold for 5 secs, w/ pillow squeeze Long Arc Quads 1 15 [x] [] [] [] Seated Marching 1 15 [x] [] [] []   
Standing Marching   [] [] [] []   
   [] [] [] []   
 
 
Pain: 
Pain level pre-treatment: 0/10 Pain level post-treatment: 0/10 Pain Intervention(s): Medication (see MAR); Rest, Repositioning Response to intervention: Nurse notified, See doc flow Activity Tolerance:  
Pt tolerated mobility well, 1 LOB Please refer to the flowsheet for vital signs taken during this treatment. After treatment:  
[x] Patient left in no apparent distress sitting up in chair 
[] Patient left in no apparent distress in bed 
[x] Call bell left within reach [x] Nursing notified 
[x] Caregiver present [x] Bed alarm activated 
[] SCDs applied COMMUNICATION/EDUCATION:  
[x]         Role of Physical Therapy in the acute care setting. [x]         Fall prevention education was provided and the patient/caregiver indicated understanding. [x]         Patient/family have participated as able in working toward goals and plan of care. []         Patient/family agree to work toward stated goals and plan of care. []         Patient understands intent and goals of therapy, but is neutral about his/her participation. []         Patient is unable to participate in stated goals/plan of care: ongoing with therapy staff. 
[]         Other: 
 
   
Jim Mendoza Time Calculation: 26 mins

## 2020-11-06 NOTE — PROGRESS NOTES
Problem: Falls - Risk of 
Goal: *Absence of Falls Description: Document Primo Garza Fall Risk and appropriate interventions in the flowsheet. Outcome: Progressing Towards Goal 
Note: Fall Risk Interventions: 
Mobility Interventions: Bed/chair exit alarm, Assess mobility with egress test, Patient to call before getting OOB, OT consult for ADLs, PT Consult for mobility concerns, Utilize walker, cane, or other assistive device Mentation Interventions: Adequate sleep, hydration, pain control, Bed/chair exit alarm, Door open when patient unattended, Family/sitter at bedside, Increase mobility, More frequent rounding, Reorient patient, Room close to nurse's station, Toileting rounds, Update white board Medication Interventions: Evaluate medications/consider consulting pharmacy, Patient to call before getting OOB, Assess postural VS orthostatic hypotension, Bed/chair exit alarm Elimination Interventions: Bed/chair exit alarm, Call light in reach, Patient to call for help with toileting needs, Toileting schedule/hourly rounds, Urinal in reach History of Falls Interventions: Bed/chair exit alarm, Consult care management for discharge planning, Door open when patient unattended, Evaluate medications/consider consulting pharmacy, Room close to nurse's station Problem: Patient Education: Go to Patient Education Activity Goal: Patient/Family Education Outcome: Progressing Towards Goal 
  
Problem: Non-Violent Restraints Goal: *Removal from restraints as soon as assessed to be safe Outcome: Progressing Towards Goal 
Goal: *No harm/injury to patient while restraints in use Outcome: Progressing Towards Goal 
Goal: *Patient's dignity will be maintained Outcome: Progressing Towards Goal 
Goal: *Patient Specific Goal (EDIT GOAL, INSERT TEXT) Outcome: Progressing Towards Goal 
Goal: Non-violent Restaints:Standard Interventions Outcome: Progressing Towards Goal 
 Goal: Non-violent Restraints:Patient Interventions Outcome: Progressing Towards Goal 
Goal: Patient/Family Education Outcome: Progressing Towards Goal 
  
Problem: Nutrition Deficit Goal: *Optimize nutritional status Outcome: Progressing Towards Goal 
  
Problem: Patient Education: Go to Patient Education Activity Goal: Patient/Family Education Outcome: Progressing Towards Goal 
  
Problem: Patient Education: Go to Patient Education Activity Goal: Patient/Family Education Outcome: Progressing Towards Goal 
  
Problem: Patient Education: Go to Patient Education Activity Goal: Patient/Family Education Outcome: Progressing Towards Goal 
  
Problem: Pressure Injury - Risk of 
Goal: *Prevention of pressure injury Description: Document Rafa Scale and appropriate interventions in the flowsheet. Outcome: Progressing Towards Goal 
Note: Pressure Injury Interventions: 
Sensory Interventions: Assess changes in LOC, Assess need for specialty bed, Check visual cues for pain, Keep linens dry and wrinkle-free, Maintain/enhance activity level, Minimize linen layers, Monitor skin under medical devices, Pressure redistribution bed/mattress (bed type) Moisture Interventions: Absorbent underpads, Apply protective barrier, creams and emollients, Check for incontinence Q2 hours and as needed, Limit adult briefs, Minimize layers, Offer toileting Q_hr Activity Interventions: Pressure redistribution bed/mattress(bed type) Mobility Interventions: HOB 30 degrees or less, Pressure redistribution bed/mattress (bed type) Nutrition Interventions: Document food/fluid/supplement intake Friction and Shear Interventions: Minimize layers Problem: Patient Education: Go to Patient Education Activity Goal: Patient/Family Education Outcome: Progressing Towards Goal

## 2020-11-06 NOTE — PROGRESS NOTES
1950 bedside turnover given to me by Fady Chirinos. Pt is in bed sleeping on cardiac telemetry monitor, I turned bed alarm on. There was a sitter with the patient during day shift, tonight the camera and bed alarm are in place. Bed is in the lowest position with the wheels locked and call bell on the bed next to him. Will continue to assess and pt's room is directly across from this RNs desk. 2010 vitals assessed, pt WNL. Denies pain. Is in bed awake on and off, opens eyes when I enter his room. Asked if he was thirsty he replied \"No\" 2040 removed his gown, assisted with urinal, removed cardiac telemetry monitor, assisted to put it with his gown back on him 
2105 bed alarm, pt trying to get out of bed, stated \"I can't find my clothes, removed gown and is standing at the side of the bed naked, wrapped him in a warm blanket, put a new gown on him and new leads, placed cardiac telemetry monitor on his back hopeful it will stay on for a bit. No signs of distress, talking in sentences. When asked if he has pain pt states \"No\". Given a strawberry ensure on ice, took a sip and handed it back to me. 
2200 pt awake watching tv, continuously putting his legs over the side of the bed and alarming the bed. When he hears it he puts his legs back on the bed hurriedly. Took his pills one at a time without issues, took a very small sip with each one and will not take a sip of his drink unless it is to swallow a pill. When I handed it to him after his meds he smiled and handed it back to me. 2315 awake, removed night gown and all sheets and cardiac monitor, was trying to get out of bed, held urinal for him and he urinated and rolled back over in bed 
2340 sleeping on cardiac telemetry monitor 0010 asking for his pants and keys, removed gown and is carrying his covers and chux pad from the bed around the room, wrapping himself up in everything as he passes it.  Asked if he needs to use the urinal stated \"No, I need my clothes and my keys now\". I informed him that his daughters are coming for a visit today and that we can ask them where his keys are and for now we need to wear a gown. Told him everyone in the halls could see he was naked, he pulled a towel around him. 0025 in bed, tied a gown around his head 
0100 sleeping on cardiac telemetry monitor on his back. Across from nurses desk. Door open and bed alarm on.  
0400 up out of bed, no clothes on, pulled both gowns off again, went to mens locker room and grabbed some scrubs, pt keeps wrapping himself up in the chux pad and sheets but taking gowns off, afraid he is going to fall over the chux pads and sheets, put mens scrubs on him. Pt sat down and calmed down once he had them on. All night he has been getting up and walking around and stating \"I need my clothes\". 0505 pt up walking to bathroom, assisted him and he sat on the toilet and urinated. 0600 room tidied, pt sat in recliner chair while I scrubbed his bed down with bleach wipes and the floor, all new linen placed and bed is in the lowest position. Wheels locked. Call bell within reach. Pt loves to listen to music, used to play in a band when he has music on he is calm, he gets up much less. Pt continuously taking off cardiac telemetry monitor, spoke with Dr. Ely Fret regarding this, he suggested restraints, I informed him that pt did have restraints and daughters are coming for a visit with palliative. Attempting to keep him out of restraints and get him placed. 0700 Sitter arrived, at bedside with him. Report given to [employer]. Pt is in bed listening to music, no signs of distress, still doesn't haver 8033  Going to replace his hard wired monitor with a remote telemetry box. Report given to [employer]. Pt has sitter at bedside. SBAR< MAR< ED summary given and a chance to ask questions. Bed is in the lowest position with the wheels locked and call bell within reach.

## 2020-11-06 NOTE — PROGRESS NOTES
Problem: Self Care Deficits Care Plan (Adult) Goal: *Acute Goals and Plan of Care (Insert Text) Description: Occupational Therapy Goals Initiated 11/1/2020 within 7 day(s). 1.  Patient will perform grooming standing at the sink with supervision/set-up for 5 minutes. 2.  Patient will perform upper body dressing with supervision/set-up utilizing compensatory strategy. 3.  Patient will perform lower body dressing with supervision/set-up utilizing energy conservation techniques and/or AE as needed. 4.  Patient will perform toilet transfers with Supervision. 5.  Patient will perform all aspects of toileting with supervision/set-up. 6.  Patient will utilize energy conservation techniques during functional activities with verbal cues. Prior Level of Function: Pt reports living alone in private residence with family nearby. Pt reports being previously independent with I/ADLs, including driving. Pt has no DME available at home. Outcome: Progressing Towards Goal 
 OCCUPATIONAL THERAPY TREATMENT Patient: Karely Banuelos (89 y.o. male) Date: 11/6/2020 Diagnosis: Atrial fibrillation with rapid ventricular response ([address] [I48.91] Atrial fibrillation with rapid ventricular response ([address] Procedure(s) (LRB): ENDOSCOPY w cautery w injection w bx's (N/A) 3 Days Post-Op Precautions: Fall PLOF: Pt reports living alone in private residence with family nearby. Pt reports being previously independent with I/ADLs, including driving. Pt has no DME available at home. Chart, occupational therapy assessment, plan of care, and goals were reviewed. ASSESSMENT: 
RN cleared pt for OT tx at this time. PT co tx to maximize pt safety and participation. Pt presented sitting upright in reclining chair, on RA, and sitter present. Pt performed STS transfer CGA with RW requiring vc's for proper hand placement.  Pt demonstrated functional room and hallway mobility CGA with RW ~ 120 ft to increase functional activity tolerance needed for self care tasks, pt with 1 notable LOB and able to self correct to mid line. GARRETT assessed LB dressing requiring SBA donning/doffing socks requiring cueing for sequencing. Pt was provided education on EC/WS technique and strategies to increase (I) and safety during all ADL tasks, ongoing education recommended for optimal independence. Pt practiced STS transfers x 5 trials for increased safety utilizing RW. Pt has poor carryover and unable to recall sequencing of simple tasks. Pt left in reclining chair, on RA, chair alarm active, B LE's elevated, sitter present, and all needs left with reach. Progression toward goals: 
[]          Improving appropriately and progressing toward goals [x]          Improving slowly and progressing toward goals 
[]          Not making progress toward goals and plan of care will be adjusted PLAN: 
Patient continues to benefit from skilled intervention to address the above impairments. Continue treatment per established plan of care. Discharge Recommendations:  with 24/7 supervision/assist vs Rehab Further Equipment Recommendations for Discharge: RW, shower chair SUBJECTIVE:  
Patient stated  My daughter's are coming to visit me today. \" OBJECTIVE DATA SUMMARY:  
Cognitive/Behavioral Status: 
Neurologic State: Alert, Confused Orientation Level: Oriented to person, Oriented to place Cognition: Impaired decision making Safety/Judgement: Lack of insight into deficits Functional Mobility and Transfers for ADLs: 
 Bed Mobility: 
  
  
Scooting: Stand-by assistance Transfers: 
Sit to Stand: Contact guard assistance Stand to Sit: Stand-by assistance Balance: 
Sitting: Intact; Without support Standing: Impaired; With support Standing - Static: Fair Standing - Dynamic : Fair;Occasional 
 
ADL Intervention: Lower Body Dressing Assistance Dressing Assistance: Stand-by assistance Socks: Stand-by assistance Leg Crossed Method Used: Yes Position Performed: Seated in chair Cues: Verbal cues provided;Visual cues provided Cognitive Retraining Safety/Judgement: Lack of insight into deficits * Pain: 
Pt reports no pain at this time. Activity Tolerance:   
Fair Please refer to the flowsheet for vital signs taken during this treatment. After treatment:  
[x]  Patient left in no apparent distress sitting up in chair 
[]  Patient left in no apparent distress in bed 
[x]  Call bell left within reach [x]  Nursing notified 
[x]  Caregiver present [x]  Chair alarm activated COMMUNICATION/EDUCATION:  
[] Role of Occupational Therapy in the acute care setting 
[] Home safety education was provided and the patient/caregiver indicated understanding. [] Patient/family have participated as able in working towards goals and plan of care. [x] Patient/family agree to work toward stated goals and plan of care. [] Patient understands intent and goals of therapy, but is neutral about his/her participation. [] Patient is unable to participate in goal setting and plan of care. Thank you for this referral. 
TAMI Cote Time Calculation: 26 mins

## 2020-11-06 NOTE — PROGRESS NOTES
[email or web address] 
[phone] Gastroenterology follow up-Progress note Impression: 1. GI Bleed s/p EGD 11/3/2020 - antritis, duodenal ulcer with adherent clot, cauterized with bicap. H pylori bx pending. No overt bleeding 2. Anemia - improving, hgb 8.5, 
3. A-fib with RVR - lovenox started 11/1/2020 4. Cardiomyopathy - EF 40-45% by echo 10/31/2020 5. New acute systolic HF 6. Encephalopathy - ? Acute vs chronic, toxic or metabolic, ammonia 41, serum ETOH 9 on admission 10/30/2020 7. Hx hallucinations, memory loss 8. Hx recreational drug use Plan: 1. Continue PPI and carafate 2. Await H pylori bx results, treat if positive 3. Monitor h/h, transfuse per protocol 4. Monitor for recurrent bleeding, if active bleed would consider IR embolization 5. Advance diet as tolerated 6. Medical management per primary team 
 
 
Chief Complaint: GI bleed Subjective:  He denies abdominal pain, no complaints today ROS: Denies any fevers, chills, rash. Eyes: conjunctiva normal, EOM normal  
Neck: ROM normal, supple and trachea normal  
Cardiovascular: heart normal, intact distal pulses, normal rate and regular rhythm Pulmonary/Chest Wall: breath sounds normal and effort normal  
Abdominal: appearance normal, bowel sounds normal and soft, non-acute, non-tender Patient Active Problem List  
Diagnosis Code  Atrial fibrillation with rapid ventricular response (HCC) I48.91  
 Acute lower gastrointestinal hemorrhage K92.2 Visit Vitals /72 (BP 1 Location: Left arm, BP Patient Position: Sitting) Pulse (!) 105 Temp 98 °F (36.7 °C) Resp 27 Ht 5' 9\" (1.753 m) Wt 68.6 kg (151 lb 3.2 oz) SpO2 100% BMI 22.33 kg/m² Intake/Output Summary (Last 24 hours) at 11/6/2020 4392 Last data filed at 11/6/2020 4794 Gross per 24 hour Intake 240 ml Output  Net 240 ml CBC w/Diff Lab Results Component Value Date/Time  WBC 8.0 11/06/2020 05:46 AM  
 RBC 2.72 (L) 11/06/2020 05:46 AM  
 HGB 8.5 (L) 11/06/2020 05:46 AM  
 HCT 27.2 (L) 11/06/2020 05:46 AM  
 .0 (H) 11/06/2020 05:46 AM  
 MCH 31.3 11/06/2020 05:46 AM  
 MCHC 31.3 11/06/2020 05:46 AM  
 RDW 13.9 11/06/2020 05:46 AM  
  11/06/2020 05:46 AM  
 Lab Results Component Value Date/Time GRANS 69 11/05/2020 04:02 AM  
 LYMPH 21 11/05/2020 04:02 AM  
 EOS 1 11/05/2020 04:02 AM  
 BASOS 0 11/05/2020 04:02 AM  
  
Basic Metabolic Profile Recent Labs 11/06/20 
0546 11/05/20 
0402 * 143  
K 3.7 3.7 * 112* CO2 25 23 BUN 43* 38* CA 8.7 8.5 MG  --  2.4 PHOS  --  3.7 Hepatic Function Lab Results Component Value Date/Time ALB 3.0 (L) 11/05/2020 04:02 AM  
 TP 6.1 (L) 11/05/2020 04:02 AM  
 AP 53 11/05/2020 04:02 AM  
 No results found for: TBIL Coags No results for input(s): PTP, INR, APTT, INREXT in the last 72 hours. [facility]. [email or web address] Phone: [phone] Pager: [phone]

## 2020-11-06 NOTE — PROGRESS NOTES
Hospitalist Progress Note Patient: Karolina Morelos Age: 79 y.o. : 1940 MR#: [id number] SSN: [id number] Date/Time: 2020 9:26 AM 
 
DOA: 10/29/2020 PCP: None Subjective: He is more easily redirected and re-oriented, however, impulsive. Sitter at beside. Tolerated oral antibiotics for his UTI, tolerated Flomax. US renal without obstruction. Urine culture is pending. Renal function recovered Lasix on hold Afib controlled. No chest pain. No further report of bleeding,  Hgb/Hct has remain stable. Tolerated IV venofer infusion yesterday. Note neurology consult, no further workup needed. MRI brain finding is likely artifact. I called and spoke with his daughter, Alexsander Hannah, [phone] with clinical updates and plan off care. ROS:  No current fever/chills, no headache, no dizziness, no facial pain, no sinus congestion, No swallowing pain, No chest pain, no palpitation, no shortness of breath, no abd pain, No diarrhea, no urinary complaint, no leg pain or swelling Assessment/Plan:  
 
1.  GI bleed due to Duodenal ulcer, s/p cauterize with bicap, s/p EGD 2. Acute anemia due to blood loss, stable Hgb/Hct Iron deficiency anemia 3. Acute encephalopathy with report of visual hallucination, currently unclear etiology No clear evidence of infection, CT head without acute event. TSH normal 
     -improving today compare to yesterday 4. Alcohol positive on admission but family does not confirm him drinking alcohol  
     -patient confirmed that he does not drinks alcohol. 
     -negative alcohol level on repeat 5. Paroxymal atrial fibrillation with RVR, in rate control 6. Acute systolic heart failure, elevated proBNP but no evidence of volume overload 7. Fall, mechanical vs syncope 8. Right upper extremity pain due to fall 9. H/o tobacco abuse 10. Hypernatremia due to lack of fluid intake 11.  Acute renal insufficiency in the setting of lasix use and not maintain oral intake. 12.  Right lower leg chronic wound, no infection ? PAD He remains stable. Neurologically concerning for dementia that might has been acute worsened by his recent medical illness. No further recommendation from neurology. Will follow up on PT/OT evaluation and their recommendation, suspected he might need SNF Continue with cipro, follow up on urine culture Continue flomax. Hold lasix, might be able to resume at lower dosing tomorrow Limited 1.5 liter fluid. Avoid salt. IO monitoring. Cont with BBlocker. Cardiology follows but no invasive workup at this time. Cont to advance diet. Cont oral medications. Close monitor of his Hgb/Hct and renal function. Cont sitter at bedside for safety. Family can visit to assist with sitter if allowed by nursing supervisor Will be benefit if his family can sit at bedside to re-orient him as hospital delirium can persists Avoid Benzo for now Hgb/Hct monitoring, transfuse in if Hgb<7. Cont PPI orally Avoid lovenox Echo reviewed. Nutritional support Full code Disposition planning: spoke with [facility] for placement planning Called and spoke with daughter Juancarlos Rivera, [phone] with clinical updates and care plan. Additional Notes:   
Time spent >35 minutes Case discussed with:  [x]Patient  [x]Family  [x]Nursing  [x]Case Management DVT Prophylaxis:  []Lovenox  []Hep SQ  [x]SCDs  []Coumadin   []On Heparin gtt Signed By: Josh Coyle MD   
 2020 9:26 AM  
  
 
 
 
 
Objective:  
VS:  
Visit Vitals /72 (BP 1 Location: Left arm, BP Patient Position: Sitting) Pulse (!) 105 Temp 98 °F (36.7 °C) Resp 27 Ht 5' 9\" (1.753 m) Wt 68.6 kg (151 lb 3.2 oz) SpO2 100% BMI 22.33 kg/m² Tmax/24hrs: Temp (24hrs), Av.3 °F (36.8 °C), Min:98 °F (36.7 °C), Max:98.6 °F (37 °C) Intake/Output Summary (Last 24 hours) at 2020 0105 Last data filed at 11/6/2020 3207 Gross per 24 hour Intake 240 ml Output  Net 240 ml Tele: afib General:  Cooperative, Not in acute distress, HEENT: PERRL, EOMI, supple neck, no JVD, dry oral mucosa Cardiovascular: S1S2 regular, no rub/gallop Pulmonary: air entry bilaterally, no wheezing, + crackle GI:  Soft, non tender, non distended, +bs, no guarding Extremities:  No pedal edema, +distal pulses appreciated Chronic right leg wound Neuro: AOx2, moving all extremities Additional:  
 
 
Current Facility-Administered Medications Medication Dose Route Frequency  pantoprazole (PROTONIX) tablet 40 mg  40 mg Oral BID  thiamine HCL (B-1) tablet 100 mg  100 mg Oral DAILY  tamsulosin (FLOMAX) capsule 0.4 mg  0.4 mg Oral QHS  ciprofloxacin HCl (CIPRO) tablet 500 mg  500 mg Oral Q12H  
 sucralfate (CARAFATE) tablet 1 g  1 g Oral AC&HS  
 metoprolol tartrate (LOPRESSOR) tablet 50 mg  50 mg Oral Q12H  
 metoprolol (LOPRESSOR) injection 5 mg  5 mg IntraVENous Q4H PRN  
 sodium chloride (NS) flush 5-40 mL  5-40 mL IntraVENous Q8H  
 sodium chloride (NS) flush 5-40 mL  5-40 mL IntraVENous PRN  
 [Held by provider] furosemide (LASIX) tablet 20 mg  20 mg Oral DAILY  hydrALAZINE (APRESOLINE) 20 mg/mL injection 10 mg  10 mg IntraVENous Q6H PRN  
 [Held by provider] aspirin chewable tablet 81 mg  81 mg Oral DAILY  therapeutic multivitamin (THERAGRAN) tablet 1 Tab  1 Tab Oral DAILY  folic acid (FOLVITE) tablet 1 mg  1 mg Oral DAILY  sodium chloride (NS) flush 5-40 mL  5-40 mL IntraVENous Q8H  
 sodium chloride (NS) flush 5-40 mL  5-40 mL IntraVENous PRN  
 acetaminophen (TYLENOL) tablet 650 mg  650 mg Oral Q6H PRN Or  
 acetaminophen (TYLENOL) suppository 650 mg  650 mg Rectal Q6H PRN  polyethylene glycol (MIRALAX) packet 17 g  17 g Oral DAILY PRN  promethazine (PHENERGAN) tablet 12.5 mg  12.5 mg Oral Q6H PRN  Or  
  ondansetron (ZOFRAN) injection 4 mg  4 mg IntraVENous Q6H PRN Lab/Data Review: 
Labs: Results:  
   
Chemistry Recent Labs 11/06/20 
1469 11/05/20 
0402 11/04/20 
8179 * 121* 88  
* 143 147* K 3.7 3.7 4.0  
* 112* 115* CO2 25 23 24 BUN 43* 38* 43* CREA 1.28 1.37* 0.97  
BUCR 34* 28* 44* AGAP 8 8 8 CA 8.7 8.5 8.3* PHOS  --  3.7  --   
 
Recent Labs 11/05/20 
0402 11/04/20 
3163 ALT 20 17  
TP 6.1* 5.6* ALB 3.0* 2.8*  
GLOB 3.1 2.8 AGRAT 1.0 1.0  
  
CBC w/Diff Recent Labs 11/06/20 
0765 11/05/20 
2047 11/05/20 
1719  11/05/20 0402 11/04/20 
6545 WBC 8.0  --   --   --  9.3  --  7.4  
RBC 2.72*  --   --   --  2.84*  --  2.86* HGB 8.5* 8.3* 8.7*   < > 8.7*   < > 8.8* HCT 27.2* 26.3* 27.3*   < > 28.1*   < > 28.0*  
.0*  --   --   --  98.9*  --  97.9*  
MCH 31.3  --   --   --  30.6  --  30.8 MCHC 31.3  --   --   --  31.0  --  31.4  
RDW 13.9  --   --   --  13.4  --  13.1   --   --   --  221  --  202 GRANS  --   --   --   --  69  --  69  
LYMPH  --   --   --   --  21  --  21  
EOS  --   --   --   --  1  --  2  
 < > = values in this interval not displayed. Coagulation No results for input(s): PTP, INR, APTT, INREXT, INREXT in the last 72 hours. Iron/Ferritin Lab Results Component Value Date/Time Iron 48 (L) 11/03/2020 12:55 AM  
 TIBC 232 (L) 11/03/2020 12:55 AM  
 Iron % saturation 21 11/03/2020 12:55 AM  
 Ferritin 63 11/03/2020 12:55 AM  
   
BNP Cardiac Enzymes Lab Results Component Value Date/Time CK 83 10/30/2020 08:14 AM  
 CK - MB 1.2 10/30/2020 08:14 AM  
 CK-MB Index 1.4 10/30/2020 08:14 AM  
 Troponin-I, QT 0.04 10/30/2020 08:14 AM  
 
  
Lactic Acid Thyroid Studies All Micro Results Procedure Component Value Units Date/Time CULTURE, URINE [[id number]] Collected:  11/05/20 1900 Order Status:  Completed Specimen:  Cath Urine Updated:  11/05/20 1952 Images: CT (Most Recent). CT Results (most recent): 
Results from Hospital Encounter encounter on 10/29/20 CT HEAD WO CONT Narrative CT OF THE BRAIN 
 
COMPARISON: None. INDICATIONS: Fall and memory loss. TECHNIQUE: Volumetric data acquisition was performed   through the brain on a 
multislice scanner and reconstructed in the axial plane. FINDINGS:   
 
The ventricles and CSF spaces are enlarged consistent with age associated 
atrophy. There is no midline shift. Vaishali Boop The brain parenchyma is of generally normal attenuation. There is decreased 
attenuation in the periventricular white matter consistent with presumed 
microvascular disease. There is no hemorrhage evident. There are no pathologic fluid collections. There are no pathologic calcifications. . 
. The visible portions of the paranasal sinuses: Are clear. Impression IMPRESSION:  
 
Atrophy and microvascular disease. No acute intracranial process. All CT scans at this facility are performed using dose optimization technique as 
appropriate to the performed exam, to include automated exposure control, 
adjustment of the mA and/or kV according to patient's size (Including 
appropriate matching for site-specific examinations), or use of iterative 
reconstruction technique. MRI Results (most recent): 
Results from Hospital Encounter encounter on 10/29/20 MRI BRAIN WO CONT Narrative Brain MR without contrast 
 
HISTORY: Confusion, memory loss, hallucinations and falls. COMPARISON: CT 10/29/2020 TECHNIQUE: Brain scanned with sagittal and axial T1W scans, axial T2W , axial 
FLAIR, axial diffusion weighted images and SWAN. FINDINGS: Details are limited by mild-to-moderate motion artifact. Cerebral parenchyma: No restricted diffusion abnormalities to suggest acute 
stroke. No evidence of edema, mass effect or mass lesion. No significant T2 or FLAIR hyperintense abnormalities. Prominent symmetric susceptibility hypointense signal in the basal ganglia. Brain volumes and ventricular system: Normal in size and morphology for the 
patient's age. Midline structures: Normal. 
 
Cerebellum: Normal. 
 
Brainstem: Normal. 
 
Vascular system: Expected arterial flow voids are present at the base of brain. Calvarium and skull base: Normal. 
 
Paranasal sinuses and mastoid air cells: Essentially clear. Couple of tiny mucus 
retention cysts in the maxillary sinuses. Visualized orbits: Unremarkable for a nondedicated exam. 
 
Visualized upper cervical spine: Unremarkable for a nondedicated exam. 
  
 Impression IMPRESSION: 
 
1. No acute brain abnormalities. 2.  Symmetric prominent susceptibility artifact in the basal ganglia suggesting 
increased iron/mineral deposition. XRAY (Most Recent) EKG No results found for this or any previous visit. 2D ECHO 10/29/20 ECHO ADULT COMPLETE 10/31/2020 10/31/2020 Narrative · LV: Estimated LVEF is 40 - 45%. Visually measured ejection fraction. Normal cavity size. Mild concentric hypertrophy. · RV: Mildly dilated right ventricle. Mildly reduced systolic function. · PA: Mild pulmonary hypertension. Pulmonary arterial systolic pressure is 46 mmHg. · IVC: Moderately elevated central venous pressure (10-15 mmHg); IVC  
diameter is larger than 21 mm and collapses more than 50% with  
respiration.  
   
  Signed by: Jerome Cortez MD
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
## 2020-11-06 NOTE — PROGRESS NOTES
1900: Bedside shift change report given to Deborah Rendon RN (oncoming nurse) by Anupam Koenig RN (offgoing nurse). Report included the following information SBAR, Kardex, Intake/Output, MAR, Recent Results and Cardiac Rhythm A fib.

## 2020-11-06 NOTE — PROGRESS NOTES
1316-Chart reviewed. PT recommending HH w/ 24/7 assistance vs SNF. CM previously discussed these recs with his daughter Akiko Cool. She states that the family is unable to care for him at home 24/7. They would like for him to go to [address] [place] [address] for rehab. CM explained that pt's insurance would also need to provide authorization for SNF. CM spoke with Jaya Cueva, ptt has been accepted to [address] [place] [address] when medically cleared. Insurance Geeta Pete would also need to be initiated closer to discharge. Current discharge plan is for the pt to go to [facility] at discharge. Per Jaya Cueva, the facility is not requiring another covid test for this patient. Last covid test was on 11/3 (rapid). 1504- CM spoke with Dr Sandy Butts. Pt ready for d/c soon. CM [place] [facility] [place]. Margo Encarnacion RN BSN Care Manager [phone]

## 2020-11-07 PROBLEM — B96.81 HELICOBACTER PYLORI GASTRITIS: Status: ACTIVE | Noted: 2020-01-01

## 2020-11-07 PROBLEM — K26.9 DUODENAL ULCER: Status: ACTIVE | Noted: 2020-01-01

## 2020-11-07 PROBLEM — K26.4 DUODENAL ULCER WITH HEMORRHAGE: Status: ACTIVE | Noted: 2020-01-01

## 2020-11-07 PROBLEM — K29.70 HELICOBACTER PYLORI GASTRITIS: Status: ACTIVE | Noted: 2020-01-01

## 2020-11-07 NOTE — PROGRESS NOTES
Problem: Falls - Risk of 
Goal: *Absence of Falls Description: Document Martell Peoples Fall Risk and appropriate interventions in the flowsheet. Outcome: Progressing Towards Goal 
Note: Fall Risk Interventions: 
Mobility Interventions: Bed/chair exit alarm, Mechanical lift, OT consult for ADLs, Strengthening exercises (ROM-active/passive) Mentation Interventions: Door open when patient unattended Medication Interventions: Assess postural VS orthostatic hypotension, Bed/chair exit alarm, Evaluate medications/consider consulting pharmacy, Patient to call before getting OOB, Teach patient to arise slowly Elimination Interventions: Bed/chair exit alarm, Call light in reach, Elevated toilet seat, Patient to call for help with toileting needs, Stay With Me (per policy), Toilet paper/wipes in reach, Toileting schedule/hourly rounds, Urinal in reach History of Falls Interventions: Bed/chair exit alarm, Door open when patient unattended, Evaluate medications/consider consulting pharmacy, Investigate reason for fall, Room close to nurse's station

## 2020-11-07 NOTE — PROGRESS NOTES
1930 bedside turnover given to me by LU Springer. Pt is in the bed with his daughter Jayne Hale and a staff sitter at bedside. No signs of distress. He is not on the cardiac telemetry monitor, he has removed it again. Pt is interacting well with his daughter, no signs of pain, talking in full sentences. Bed is in the lowest position with the wheels locked and call bell within reach on the bed next to him. 1940 went in and placed back on cardiac telemetry monitor. Pt is in a great mood, talking to his daughter about music. I informed her that he listens to [employer] at night, it is his favorite and she informed me that he plays a guitar. 2010 in bed, vitals reassessed, staff sitter sitting in the room with him, his daughter is getting ready to leave. Pt denies pain and denies shortness of breath, ate approximately 1/3 of his fish dinner, asked if he was finished he replied \"Yes\", removed dinner tray. 2100 out of bed, looking for bathroom, urinated on the floor. Had removed his gown and covers and was moving around trying to get to a corner. After cleaned him up and assisted him back into bed. 
2145 given some orange juice, offered a snack pt stated \"I don't want anything\". 2315 out of bed, removed gown naked trying to walk out into the hallway,  
2200 started to try to give him his night time medications, he swallowed two pills then said \"Ok that is all for now\". I will try for the others soon, he did take the liquid antibiotic without issues and the metoprolol and a cipro. 2300  Confused out of bed, gown off, looking for a corner to urinate in again, held the urinal for him and he urinated in it. Placed underpants on him and a scrub shirt, he will not keep gowns on. Put leads on him under the shirt so he can't easily remove them, assessed vitals and assisted him back into bed. 
0025 Purposeful rounding completed: 
 Side rails up x 2:  YES Bed in low position and wheels locked: YES Call bell within reach: YES 
 Comfort addressed: YES Toileting needs addressed: YES Plan of care reviewed/updated with patient and or family members: William Aschoff Pain assessed and addressed: YES, 0 
 
0200 pt wet, bathed, new gown placed bedding changed, pt given ice water no other needs at this time. Brittany Supervisor obtained a puzzle apron for patient to entertain him 
0700 bedside turnover given to [employer]. Pt is seated in the chair with Janie as sitter at bedside. He is on the cardiac telemetry monitor

## 2020-11-07 NOTE — PROGRESS NOTES
Received call from [address], [address],  regarding [employer] authorization. [employer] would like a PEER to PEER done before 11/9/2020. Please call [phone] Option 5. Dr Mary Stinson notified. Fercho Gray, RN BSN Care Manager [phone]

## 2020-11-07 NOTE — PROGRESS NOTES
Called [employer] to check on SNF authorization. Authorization still pending. Mirta Raymond RN BSN Care Manager [phone]

## 2020-11-07 NOTE — PROGRESS NOTES
Problem: Self Care Deficits Care Plan (Adult) Goal: *Acute Goals and Plan of Care (Insert Text) Description: Occupational Therapy Goals Initiated 11/1/2020 within 7 day(s). Re-evaluated 11/7/20. Goal 1 Met, continue with all remaining goals. 1.  Patient will perform grooming standing at the sink with supervision/set-up for 5 minutes. (Goal met 11/7/20) 2. Patient will perform upper body dressing with supervision/set-up utilizing compensatory strategy. 3.  Patient will perform lower body dressing with supervision/set-up utilizing energy conservation techniques and/or AE as needed. 4.  Patient will perform toilet transfers with Supervision. 5.  Patient will perform all aspects of toileting with supervision/set-up. 6.  Patient will utilize energy conservation techniques during functional activities with verbal cues. Prior Level of Function: Pt reports living alone in private residence with family nearby. Pt reports being previously independent with I/ADLs, including driving. Pt has no DME available at home. Per conversation with pt's daughter pt has shower chair that he uses daily for bathing. Outcome: Progressing Towards Goal 
OCCUPATIONAL THERAPY RE-EVALUATION and TREATMENT Patient: Jabari Fuller (89 y.o. male) Date: 11/7/2020 Primary Diagnosis: Atrial fibrillation with rapid ventricular response ([address] [I48.91] Procedure(s) (LRB): ENDOSCOPY w cautery w injection w bx's (N/A) 4 Days Post-Op Precautions:  Fall, Skin ASSESSMENT : 
Pt cleared to participate in OT evaluation by RN. Upon entering room, pt received in bed, alert, with supportive daughter present, and agreeable to OT re-evaluation/treatment. Based on the objective data described below, the continues to present with confusion and decreased attention to task, benefiting from Supervision during ADLs at all times and SBA/CGA for safety during functional txfr and functional mobility to the BR.  Pt benefiting from short commands for redirection to task as pt is easily distracted. Pt's daughter is present during the session and very engaged and supportive. Per pt's daughter pt is maneuvering around the room w/o AD. However, upon transitioning to standing from EOB pt had 2 LOB, requiring CGA to return to midline. Educated pt and pt's daughter on PT recommendation of FWW. Pt's balance and safety with functional mobility during ADLs much improved when provided with FWW. Pt and pt's daughter educated on mult adaptive strategies for UB ADLs due to persistent pain in RUE, and on recommendation to have supervision at all times for the pt. Per pt's daughter they are trying to arrange 24/7 Supervision from family members when pt is discharged if unable to go to rehab. Patient will benefit from skilled intervention to address the above impairments. Patient's rehabilitation potential is considered to be Good Factors which may influence rehabilitation potential include:  
[]             None noted [x]             Mental ability/status [x]             Medical condition [x]             Home/family situation and support systems [x]             Safety awareness []             Pain tolerance/management 
[]             Other: PLAN : 
Recommendations and Planned Interventions:  
[x]               Self Care Training                  [x]      Therapeutic Activities [x]               Functional Mobility Training   [x]      Cognitive Retraining 
[x]               Therapeutic Exercises           [x]      Endurance Activities [x]               Balance Training                    [x]      Neuromuscular Re-Education [x]               Visual/Perceptual Training     [x]      Home Safety Training 
[x]               Patient Education                   [x]      Family Training/Education []               Other (comment): Frequency/Duration: Patient will be followed by occupational therapy 1-2 times per day/4-7 days per week to address goals. Discharge Recommendations: Rehab vs Home Health with 24/7 Supervision. Further Equipment Recommendations for Discharge: rolling walker SUBJECTIVE:  
Patient stated I have to be careful. That fall got the best of me.  OBJECTIVE DATA SUMMARY:  
Hospital course since last seen and reason for reevaluation: Pt is due for OT re-evaluation. Pt appears to be benefiting from receiving OT services during this hospitalization stay. Pt had mult procedures/testing limiting his participation, and had episodes of increased confusion when he was not able to meaningfully participate. Based on the most recent OT sessions pt is making steady progress toward goals in spite of confusion. OT will continue to follow the patient for further intervention during this hospitalization, in order to maximize ADL performance and overall functional independence. Past Medical History:  
Diagnosis Date Ill-defined condition   
 mild memory loss with hallucinations History reviewed. No pertinent surgical history. Barriers to Learning/Limitations: yes;  cognitive and altered mental status (i.e.Sedation, Confusion) Compensate with: visual, verbal, tactile, kinesthetic cues/model Home Situation:  
Home Situation Home Environment: Private residence # Steps to Enter: 4 Rails to Enter: Yes Hand Rails : Bilateral 
One/Two Story Residence: One story Living Alone: Yes Support Systems: Family member(s), Child(josie) Patient Expects to be Discharged to[de-identified] Private residence(per pt's daughter pt is to live with family) Current DME Used/Available at Home: Shower chair, Grab bars Tub or Shower Type: Tub/Shower combination 
[x]  Right hand dominant   []  Left hand dominant Cognitive/Behavioral Status: 
Neurologic State: Alert;Confused Orientation Level: Oriented to person;Oriented to place Cognition: Follows commands;Decreased attention/concentration; Impaired decision making Safety/Judgement: Decreased insight into deficits; Fall prevention;Home safety;Decreased awareness of environment Skin: visible skin appears intact (dressing on RLE) Edema: none noted Vision/Perceptual:   
   Acuity: Able to read clock/calendar on wall without difficulty Coordination: BUE Coordination: Generally decreased, functional 
Fine Motor Skills-Upper: Left Intact; Right Intact Gross Motor Skills-Upper: Left Intact; Right Intact Balance: 
Sitting: Intact Standing: Impaired; With support Standing - Static: Good Standing - Dynamic : Fair Strength: BUE Strength: Generally decreased, functional 
 Tone & Sensation: BUE Tone: Normal 
Sensation: Intact Range of Motion: BUE 
 
AROM: Generally decreased, functional(Decreased RUE ROM) PROM: Within functional limits Functional Mobility and Transfers for ADLs: 
Bed Mobility: 
Supine to Sit: Supervision Transfers: 
Sit to Stand: Stand-by assistance Stand to Sit: Stand-by assistance Bed to Chair: Stand-by assistance Toilet Transfer : Stand-by assistance(simulated) Bathroom Mobility: Contact guard assistance ADL Assessment:  
Feeding: Modified independent Oral Facial Hygiene/Grooming: Supervision;Setup Bathing: Contact guard assistance Upper Body Dressing: Contact guard assistance Lower Body Dressing: Contact guard assistance Toileting: Minimum assistance ADL Intervention: 
 Grooming Grooming Assistance: Supervision Position Performed: Standing Washing Face: Supervision Brushing Teeth: Supervision(rinsing mouth) Upper Body Dressing Assistance Hospital Gown: Contact guard assistance Shirt simulation with hospital gown: Contact guard assistance Lower Body Dressing Assistance Socks: Contact guard assistance(due to pt's decreased attention to task) Cognitive Retraining Safety/Judgement: Decreased insight into deficits; Fall prevention;Home safety;Decreased awareness of environment Pain: 
Pain level pre-treatment: 0/10 Pain level post-treatment: 0/10 Activity Tolerance:  
Good Please refer to the flowsheet for vital signs taken during this treatment. After treatment:  
[x] Patient left in no apparent distress sitting up in chair 
[] Patient left in no apparent distress in bed 
[x] Call bell left within reach [x] Nursing notified 
[x] Caregiver present [x] Chair alarm activated COMMUNICATION/EDUCATION:  
[x] Role of Occupational Therapy in the acute care setting 
[x] Home safety education was provided and the patient/caregiver indicated understanding. [x] Patient/family have participated as able in goal setting and plan of care. [x] Patient/family agree to work toward stated goals and plan of care. [] Patient understands intent and goals of therapy, but is neutral about his/her participation. [] Patient is unable to participate in goal setting and plan of care. Thank you for this referral. 
Emi Langford, OTR/L Time Calculation: 39 mins

## 2020-11-07 NOTE — PROGRESS NOTES
Hospitalist Progress Note Patient: Eligio Wang Age: 79 y.o. : 1940 MR#: [id number] SSN: [id number] Date/Time: 2020 12:05 PM 
 
DOA: 10/29/2020 PCP: None Subjective: He was able to participate with PT yesterday, able to walk with rolling walker for 120 ft with minimal assistance He has been able to remain without agitation. He requires sitter for his safety but easily re-direct. He tolerated oral antibiotics for UTI, urine culture grew mixture He was also started on Amoxicillin/clarithromycin/PPI for H.pylori positive on biopsy Renal function recovered. His Na increased. Nursing note that he does not maintain oral hydration. Lasix has been on hold Afib controlled. No chest pain. No further report of bleeding,  Hgb/Hct has remain stable. Tolerated IV venofer infusion yesterday. Note neurology consult, no further workup needed. MRI brain finding is likely artifact. Daughter, Rosangela Jimenez at bedside with updates today ROS:  No current fever/chills, no headache, no dizziness, no facial pain, no sinus congestion, No swallowing pain, No chest pain, no palpitation, no shortness of breath, no abd pain, No diarrhea, no urinary complaint, no leg pain or swelling Assessment/Plan:  
 
1.  GI bleed due to Duodenal ulcer, s/p cauterize with bicap, s/p EGD Duodenal ulcer with biopsy showing H.pylori infection 2. Acute anemia due to blood loss, stable Hgb/Hct With Iron deficiency anemia 3. Acute encephalopathy with report of visual hallucination, currently unclear etiology No clear evidence of infection, CT head without acute event. TSH normal 
     MRI brain without stroke 4. Alcohol positive on admission but no evidence of alcohol consumption  
     -patient confirmed that he does not drinks alcohol. 
     -negative alcohol level on repeat 5. Paroxymal atrial fibrillation with RVR, in rate control 6.  Acute systolic heart failure, elevated proBNP but no evidence of volume overload 7. Fall, mechanical vs syncope 8. Right upper extremity pain due to fall 9. H/o tobacco abuse 10. Hypernatremia due to lack of fluid intake 11. Acute renal insufficiency in the setting of lasix use and not maintain oral intake. Hypernatremia, due to decrease oral hydration 12. Right lower leg chronic wound, no infection ? PAD This patient is too high function for SNF according to his PT/OT. He will needs 24 hours supervision for safety with his care. He is currently stable. Neurology concerns for dementia that was acute worsened by his recent medical illness. No further recommendation from neurology. Stop cipro after today, urine without growth. He is on Amoxicillin/clarithromycin/PP for H.pylori Continue flomax. Hold lasix, might be able to resume at lower dosing tomorrow Limited 1.5 liter fluid. Avoid salt. IO monitoring. Cont with BBlocker. Cardiology follows but no invasive workup at this time. Cont to advance diet. Cont oral medications. Close monitor of his Hgb/Hct and renal function. Cont sitter at bedside for safety. Family can visit to assist with sitter if allowed by nursing supervisor Avoid Benzo for now (consider seroquel or haldol for agitation) Hgb/Hct monitoring, transfuse in if Hgb<7. Cont PPI orally Avoid lovenox Echo reviewed. Nutritional support Full code Disposition planning: spoke with [facility] for placement planning Spoke with Keyur Agrawal at bedside with clinical updates and plan of care. Update that he will likely get denied for his SNF authorization, family will plan for different setting for his safe discharge Additional Notes:   
Time spent >35 minutes Case discussed with:  [x]Patient  [x]Family  [x]Nursing  [x]Case Management DVT Prophylaxis:  []Lovenox  []Hep SQ  [x]SCDs  []Coumadin   []On Heparin gtt Signed By: Daniel King MD   
 2020 12:05 PM  
  
 
 
 
 
Objective:  
VS:  
Visit Vitals /66 (BP 1 Location: Right arm, BP Patient Position: At rest) Pulse 87 Temp 97.8 °F (36.6 °C) Resp 16 Ht 5' 9\" (1.753 m) Wt 68.6 kg (151 lb 3.1 oz) SpO2 97% BMI 22.33 kg/m² Tmax/24hrs: Temp (24hrs), Av.6 °F (36.4 °C), Min:97.3 °F (36.3 °C), Max:97.9 °F (36.6 °C) Intake/Output Summary (Last 24 hours) at 2020 1205 Last data filed at 2020 7461 Gross per 24 hour Intake 360 ml Output 360 ml Net 0 ml Tele: afib General:  Cooperative, Not in acute distress, HEENT: PERRL, EOMI, supple neck, no JVD, dry oral mucosa Cardiovascular: S1S2 regular, no rub/gallop Pulmonary: air entry bilaterally, no wheezing, + crackle GI:  Soft, non tender, non distended, +bs, no guarding Extremities:  No pedal edema, +distal pulses appreciated Chronic right leg wound Neuro: AOx2, moving all extremities Additional:  
 
 
Current Facility-Administered Medications Medication Dose Route Frequency  amoxicillin (AMOXIL) capsule 1,000 mg  1,000 mg Oral Q12H  clarithromycin (BIAXIN) 250 mg/5 mL oral suspension 500 mg  500 mg Oral Q12H  pantoprazole (PROTONIX) tablet 40 mg  40 mg Oral BID  thiamine HCL (B-1) tablet 100 mg  100 mg Oral DAILY  ciprofloxacin HCl (CIPRO) tablet 500 mg  500 mg Oral Q12H  
 sucralfate (CARAFATE) tablet 1 g  1 g Oral AC&HS  
 metoprolol tartrate (LOPRESSOR) tablet 50 mg  50 mg Oral Q12H  
 metoprolol (LOPRESSOR) injection 5 mg  5 mg IntraVENous Q4H PRN  
 sodium chloride (NS) flush 5-40 mL  5-40 mL IntraVENous Q8H  
 sodium chloride (NS) flush 5-40 mL  5-40 mL IntraVENous PRN  
 [Held by provider] furosemide (LASIX) tablet 20 mg  20 mg Oral DAILY  hydrALAZINE (APRESOLINE) 20 mg/mL injection 10 mg  10 mg IntraVENous Q6H PRN  
 [Held by provider] aspirin chewable tablet 81 mg  81 mg Oral DAILY  therapeutic multivitamin (THERAGRAN) tablet 1 Tab  1 Tab Oral DAILY  folic acid (FOLVITE) tablet 1 mg  1 mg Oral DAILY  sodium chloride (NS) flush 5-40 mL  5-40 mL IntraVENous Q8H  
 sodium chloride (NS) flush 5-40 mL  5-40 mL IntraVENous PRN  
 acetaminophen (TYLENOL) tablet 650 mg  650 mg Oral Q6H PRN Or  
 acetaminophen (TYLENOL) suppository 650 mg  650 mg Rectal Q6H PRN  polyethylene glycol (MIRALAX) packet 17 g  17 g Oral DAILY PRN  promethazine (PHENERGAN) tablet 12.5 mg  12.5 mg Oral Q6H PRN Or  
 ondansetron (ZOFRAN) injection 4 mg  4 mg IntraVENous Q6H PRN Lab/Data Review: 
Labs: Results:  
   
Chemistry Recent Labs 11/07/20 
1590 11/06/20 
5721 11/05/20 
0402 * 120* 121* * 147* 143  
K 3.9 3.7 3.7 * 114* 112* CO2 27 25 23 BUN 42* 43* 38* CREA 1.21 1.28 1.37* BUCR 35* 34* 28* AGAP 7 8 8 CA 8.8 8.7 8.5 PHOS  --   --  3.7 Recent Labs 11/05/20 
0402 ALT 20  
TP 6.1* ALB 3.0*  
GLOB 3.1 AGRAT 1.0  
  
CBC w/Diff Recent Labs 11/07/20 
8589 11/06/20 
0478 11/05/20 
2047  11/05/20 
0402 WBC 9.1 8.0  --   --  9.3  
RBC 2.79* 2.72*  --   --  2.84* HGB 8.8* 8.5* 8.3*   < > 8.7* HCT 28.5* 27.2* 26.3*   < > 28.1*  
.2* 100.0*  --   --  98.9*  
MCH 31.5 31.3  --   --  30.6 MCHC 30.9* 31.3  --   --  31.0  
RDW 14.4 13.9  --   --  13.4  221  --   --  221 GRANS  --   --   --   --  69  
LYMPH  --   --   --   --  21  
EOS  --   --   --   --  1  
 < > = values in this interval not displayed. Coagulation No results for input(s): PTP, INR, APTT, INREXT, INREXT in the last 72 hours. Iron/Ferritin Lab Results Component Value Date/Time Iron 48 (L) 11/03/2020 12:55 AM  
 TIBC 232 (L) 11/03/2020 12:55 AM  
 Iron % saturation 21 11/03/2020 12:55 AM  
 Ferritin 63 11/03/2020 12:55 AM  
   
BNP Cardiac Enzymes Lab Results Component Value Date/Time  CK 83 10/30/2020 08:14 AM  
 CK - MB 1.2 10/30/2020 08:14 AM  
 CK-MB Index 1.4 10/30/2020 08:14 AM  
 Troponin-I, QT 0.04 10/30/2020 08:14 AM  
 
  
Lactic Acid Thyroid Studies All Micro Results Procedure Component Value Units Date/Time CULTURE, URINE [[id number]] Collected:  11/05/20 1900 Order Status:  Completed Specimen:  Cath Urine Updated:  11/07/20 1034 Special Requests: NO SPECIAL REQUESTS [place] Count --     
  [id number] COLONIES/mL Culture result:    
  MIXED UROGENITAL ENMANUEL ISOLATED Images: 
 
CT (Most Recent). CT Results (most recent): 
Results from Hospital Encounter encounter on 10/29/20 CT HEAD WO CONT Narrative CT OF THE BRAIN 
 
COMPARISON: None. INDICATIONS: Fall and memory loss. TECHNIQUE: Volumetric data acquisition was performed   through the brain on a 
multislice scanner and reconstructed in the axial plane. FINDINGS:   
 
The ventricles and CSF spaces are enlarged consistent with age associated 
atrophy. There is no midline shift. Jose Carlos Brinks The brain parenchyma is of generally normal attenuation. There is decreased 
attenuation in the periventricular white matter consistent with presumed 
microvascular disease. There is no hemorrhage evident. There are no pathologic fluid collections. There are no pathologic calcifications. . 
. The visible portions of the paranasal sinuses: Are clear. Impression IMPRESSION:  
 
Atrophy and microvascular disease. No acute intracranial process. All CT scans at this facility are performed using dose optimization technique as 
appropriate to the performed exam, to include automated exposure control, 
adjustment of the mA and/or kV according to patient's size (Including 
appropriate matching for site-specific examinations), or use of iterative 
reconstruction technique. MRI Results (most recent): 
Results from Hospital Encounter encounter on 10/29/20 MRI BRAIN WO CONT Narrative Brain MR without contrast 
 
HISTORY: Confusion, memory loss, hallucinations and falls. COMPARISON: CT 10/29/2020 TECHNIQUE: Brain scanned with sagittal and axial T1W scans, axial T2W , axial 
FLAIR, axial diffusion weighted images and SWAN. FINDINGS: Details are limited by mild-to-moderate motion artifact. Cerebral parenchyma: No restricted diffusion abnormalities to suggest acute 
stroke. No evidence of edema, mass effect or mass lesion. No significant T2 or FLAIR hyperintense abnormalities. Prominent symmetric susceptibility hypointense 
signal in the basal ganglia. Brain volumes and ventricular system: Normal in size and morphology for the 
patient's age. Midline structures: Normal. 
 
Cerebellum: Normal. 
 
Brainstem: Normal. 
 
Vascular system: Expected arterial flow voids are present at the base of brain. Calvarium and skull base: Normal. 
 
Paranasal sinuses and mastoid air cells: Essentially clear. Couple of tiny mucus 
retention cysts in the maxillary sinuses. Visualized orbits: Unremarkable for a nondedicated exam. 
 
Visualized upper cervical spine: Unremarkable for a nondedicated exam. 
  
 Impression IMPRESSION: 
 
1. No acute brain abnormalities. 2.  Symmetric prominent susceptibility artifact in the basal ganglia suggesting 
increased iron/mineral deposition. XRAY (Most Recent) EKG No results found for this or any previous visit. 2D ECHO 10/29/20 ECHO ADULT COMPLETE 10/31/2020 10/31/2020 Narrative · LV: Estimated LVEF is 40 - 45%. Visually measured ejection fraction. Normal cavity size. Mild concentric hypertrophy. · RV: Mildly dilated right ventricle. Mildly reduced systolic function. · PA: Mild pulmonary hypertension. Pulmonary arterial systolic pressure is 46 mmHg. · IVC: Moderately elevated central venous pressure (10-15 mmHg); IVC  
diameter is larger than 21 mm and collapses more than 50% with  
respiration. Signed by: Urban Ramos MD

## 2020-11-07 NOTE — PROGRESS NOTES
[place] [address] [facility], [address] [facility], [address] [facility] 
[email or web address] Impression/Plan: 1. DU w/ stigmata of hemorrhage H pylori gastritis-on triple Rx 
AMS- much improved Plan:  Continue current Rx Chief Complaint:  
GI bleed Subjective:   
 
H&H stable , no pain, N/V or melena Eyes: conjunctiva normal, EOM normal  
Neck: ROM normal, supple and trachea normal  
Cardiovascular: heart normal, intact distal pulses, normal rate and regular rhythm Pulmonary/Chest Wall: breath sounds normal and effort normal  
Abdominal: appearance normal, bowel sounds normal and soft, non-acute, non-tender Visit Vitals /69 (BP 1 Location: Right arm, BP Patient Position: Sitting) Pulse (!) 111 Temp 97.9 °F (36.6 °C) Resp 16 Ht 5' 9\" (1.753 m) Wt 68.6 kg (151 lb 3.1 oz) SpO2 96% BMI 22.33 kg/m² Intake/Output Summary (Last 24 hours) at 11/7/2020 0900 Last data filed at 11/7/2020 7780 Gross per 24 hour Intake 120 ml Output 360 ml Net -240 ml CBC w/Diff Lab Results Component Value Date/Time WBC 9.1 11/07/2020 02:13 AM  
 RBC 2.79 (L) 11/07/2020 02:13 AM  
 HGB 8.8 (L) 11/07/2020 02:13 AM  
 HCT 28.5 (L) 11/07/2020 02:13 AM  
 .2 (H) 11/07/2020 02:13 AM  
 MCH 31.5 11/07/2020 02:13 AM  
 MCHC 30.9 (L) 11/07/2020 02:13 AM  
 RDW 14.4 11/07/2020 02:13 AM  
  11/07/2020 02:13 AM  
 Lab Results Component Value Date/Time GRANS 69 11/05/2020 04:02 AM  
 LYMPH 21 11/05/2020 04:02 AM  
 EOS 1 11/05/2020 04:02 AM  
 BASOS 0 11/05/2020 04:02 AM  
  
Basic Metabolic Profile Recent Labs 11/07/20 
6410  11/05/20 
0402 *   < > 143 K 3.9   < > 3.7 *   < > 112* CO2 27   < > 23 BUN 42*   < > 38* CA 8.8   < > 8.5 MG  --   --  2.4 PHOS  --   --  3.7  
 < > = values in this interval not displayed. Hepatic Function Lab Results Component Value Date/Time ALB 3.0 (L) 11/05/2020 04:02 AM  
 TP 6.1 (L) 11/05/2020 04:02 AM  
 AP 53 11/05/2020 04:02 AM  
 No results found for: EUGENE White MD, MD 
[facility], [address] [facility] 
[email or web address] [place] [facility]

## 2020-11-07 NOTE — PROGRESS NOTES
0708-Bedside and Verbal shift change report received from  [address] [facility] (off going nurse). Report included the following information SBAR, Kardex, MAR and Recent Results. Assumed care,fall prevention program maintained,call bell and bedside table within reach. Sitter at bedside. Will continue care. 1100-Daughter came and stayed w/ patient the whole day. Patient remains calm the whole day. 1717-Bedside and Verbal shift change report given to LU Reno (oncoming nurse) by Courtney Benites RN (offgoing nurse).  Report included the following information SBAR, Kardex, MAR and Recent Results.

## 2020-11-08 NOTE — PROGRESS NOTES
0700 - dressing changed on right lower extremity. One oil emulsion dressing, one vaselinized gaus, wrapped in curlex. Wound open, pink and yellow granulated tissue, approx 2x2in.

## 2020-11-08 NOTE — PROGRESS NOTES
Hospitalist Progress Note Patient: Rubina Gastelum Age: 79 y.o. : 1940 MR#: [id number] SSN: [id number] Date/Time: 2020 11:49 AM 
 
DOA: 10/29/2020 PCP: None Subjective:  
 
Patient is found walking with rolling walker in hallway, assisted by his daughter. Post walking, he has left foot pain, aching. His daughter observed that he is having shortness of breath with his walking He is not agitated and appropriate during interview, daughter remains at bedside. Per nursing, patient requires more assistance to get him to bathroom yesterday. His other daughter also observed that he needs more assistance to his toilet on Friday. He has been maintain adequate oral intake. No IV line due to him pulling the out. Has been encouraging to maintain oral hydration His lasix has been on hold. He tolerated Amoxicillin/clarithromycin/PPI for H.pylori positive on biopsy He tolerated 3 days of ciprofloxacin but his urine mixed uro-gilda Afib controlled. No chest pain. No further report of bleeding,  Hgb/Hct has remain stable. Na is improving Daughter, Radha Phillips at bedside with updates today. I spoke with his other two daughters over the phone with further clinical upper, family requested that I [place] [facility] [id number] as they don't think that he can be home yet. I [place] [facility] and [place] Peer to Peer with Dr. Moses Lozoya. She will review further updates and will get back with decision on authorization. ROS:  No current fever/chills, no headache, no dizziness, no facial pain, no sinus congestion, No swallowing pain, No chest pain, no palpitation, no shortness of breath, no abd pain, No diarrhea, no urinary complaint, + leg pain or swelling Assessment/Plan:  
 
1.  GI bleed due to Duodenal ulcer, s/p cauterize with bicap, s/p EGD Duodenal ulcer with biopsy showing H.pylori infection 2. Acute anemia due to blood loss, stable Hgb/Hct With Iron deficiency anemia 3. Acute encephalopathy with visual hallucination, unclear etiology,  
     -neurologist suspected underlying vascular dementia that was acutely worsened due to recent CHF and AFIB No clear evidence of infection, CT head without acute event. TSH normal 
     MRI brain without stroke 4. Alcohol positive on admission but no evidence of alcohol consumption  
     -patient confirmed that he does not drinks alcohol. 
     -negative alcohol level on repeat 5. Paroxymal atrial fibrillation with RVR, in rate control 6. Acute systolic heart failure, elevated proBNP but no evidence of volume overload 7. Fall, mechanical vs syncope 8. Right upper extremity pain due to fall 9. H/o tobacco abuse 10. Hypernatremia due to lack of fluid intake 11. Acute renal insufficiency in the setting of lasix use and not maintain oral intake. Hypernatremia, due to decrease oral hydration 12. Right lower leg chronic wound, no infection ? PAD Cont Amoxicillin/clarithromycin/PP for H.pylori Stop ciprofloxacin, Continue flomax. Hold lasix, will resume tomorrow Limited 1.5 liter fluid. Avoid salt. IO monitoring. Cont with BBlocker. Cardiology follows but no invasive workup at this time. Will need to resume ASA in 5 days if bleeding risk remains low He is too high function for SNF according to his PT/OT. He will needs 24 hours supervision for safety with his care. I have reached out to [facility] [employer] [facility] Peer to Peer review, pending final decision He is currently stable. Neurology concerns for dementia that was acute worsened by his recent medical illness. No further recommendation from neurology. Cont to advance diet. Cont oral medications. Close monitor of his Hgb/Hct and renal function. Cont sitter at bedside for safety. Family can visit to assist with sitter if allowed by nursing supervisor Avoid Benzo for now (consider seroquel or haldol for agitation) Hgb/Hct monitoring, transfuse in if Hgb<7. Cont PPI orally Avoid lovenox Echo reviewed. Nutritional support Full code Disposition planning: spoke with [facility] for placement planning Spoke with Radha Jacqueline and other sister with clinical updates and plan of care. Additional Notes:   
Time spent >35 minutes Case discussed with:  [x]Patient  [x]Family  [x]Nursing  [x]Case Management DVT Prophylaxis:  []Lovenox  []Hep SQ  [x]SCDs  []Coumadin   []On Heparin gtt Signed By: Iris Gorman MD   
 2020 11:49 AM  
  
 
 
 
 
Objective:  
VS:  
Visit Vitals /85 (BP 1 Location: Right arm, BP Patient Position: At rest) Pulse (!) 106 Temp 98 °F (36.7 °C) Resp 17 Ht 5' 9\" (1.753 m) Wt 69.9 kg (154 lb) SpO2 96% BMI 22.74 kg/m² Tmax/24hrs: Temp (24hrs), Av.7 °F (36.5 °C), Min:97.2 °F (36.2 °C), Max:98.4 °F (36.9 °C) Intake/Output Summary (Last 24 hours) at 2020 1149 Last data filed at 2020 9890 Gross per 24 hour Intake 1505.75 ml Output 150 ml Net 1355.75 ml Tele: afib General:  Cooperative, Not in acute distress, HEENT: PERRL, EOMI, supple neck, no JVD, dry oral mucosa Cardiovascular: S1S2 regular, no rub/gallop Pulmonary: air entry bilaterally, no wheezing, + crackle GI:  Soft, non tender, non distended, +bs, no guarding Extremities:  No pedal edema, +distal pulses appreciated Chronic right leg wound Neuro: AOx2, moving all extremities Additional:  
 
 
Current Facility-Administered Medications Medication Dose Route Frequency  influenza vaccine  (6 mos+)(PF) (FLUARIX/FLULAVAL/FLUZONE QUAD) injection 0.5 mL  0.5 mL IntraMUSCular PRIOR TO DISCHARGE  amoxicillin (AMOXIL) capsule 1,000 mg  1,000 mg Oral Q12H  clarithromycin (BIAXIN) 250 mg/5 mL oral suspension 500 mg  500 mg Oral Q12H  pantoprazole (PROTONIX) tablet 40 mg  40 mg Oral BID  
  thiamine HCL (B-1) tablet 100 mg  100 mg Oral DAILY  sucralfate (CARAFATE) tablet 1 g  1 g Oral AC&HS  
 metoprolol tartrate (LOPRESSOR) tablet 50 mg  50 mg Oral Q12H  
 metoprolol (LOPRESSOR) injection 5 mg  5 mg IntraVENous Q4H PRN  
 sodium chloride (NS) flush 5-40 mL  5-40 mL IntraVENous Q8H  
 sodium chloride (NS) flush 5-40 mL  5-40 mL IntraVENous PRN  
 [Held by provider] furosemide (LASIX) tablet 20 mg  20 mg Oral DAILY  hydrALAZINE (APRESOLINE) 20 mg/mL injection 10 mg  10 mg IntraVENous Q6H PRN  
 [Held by provider] aspirin chewable tablet 81 mg  81 mg Oral DAILY  therapeutic multivitamin (THERAGRAN) tablet 1 Tab  1 Tab Oral DAILY  folic acid (FOLVITE) tablet 1 mg  1 mg Oral DAILY  sodium chloride (NS) flush 5-40 mL  5-40 mL IntraVENous Q8H  
 sodium chloride (NS) flush 5-40 mL  5-40 mL IntraVENous PRN  
 acetaminophen (TYLENOL) tablet 650 mg  650 mg Oral Q6H PRN Or  
 acetaminophen (TYLENOL) suppository 650 mg  650 mg Rectal Q6H PRN  polyethylene glycol (MIRALAX) packet 17 g  17 g Oral DAILY PRN  promethazine (PHENERGAN) tablet 12.5 mg  12.5 mg Oral Q6H PRN Or  
 ondansetron (ZOFRAN) injection 4 mg  4 mg IntraVENous Q6H PRN Lab/Data Review: 
Labs: Results:  
   
Chemistry Recent Labs 11/08/20 
0600 11/07/20 
0213 11/06/20 
0546 * 113* 120* * 149* 147* K 4.5 3.9 3.7 * 115* 114* CO2 28 27 25 BUN 36* 42* 43* CREA 1.09 1.21 1.28  
BUCR 33* 35* 34* AGAP 7 7 8 CA 8.3* 8.8 8.7 No results for input(s): TBIL, ALT, ALKP, TP, ALB, GLOB, AGRAT in the last 72 hours. No lab exists for component: SGOT  
CBC w/Diff Recent Labs 11/07/20 
9328 11/06/20 
0546  11/05/20 2047 WBC 9.1 8.0  --   --   
RBC 2.79* 2.72*  --   --   
HGB 8.8* 8.5*  --  8.3* HCT 28.5* 27.2*  --  26.3*  
.2* 100.0*   < >  --   
MCH 31.5 31.3   < >  --   
MCHC 30.9* 31.3   < >  --   
RDW 14.4 13.9   < >  --   
 221  --   --   
 < > = values in this interval not displayed. Coagulation No results for input(s): PTP, INR, APTT, INREXT, INREXT in the last 72 hours. Iron/Ferritin Lab Results Component Value Date/Time Iron 48 (L) 11/03/2020 12:55 AM  
 TIBC 232 (L) 11/03/2020 12:55 AM  
 Iron % saturation 21 11/03/2020 12:55 AM  
 Ferritin 63 11/03/2020 12:55 AM  
   
BNP Cardiac Enzymes Lab Results Component Value Date/Time CK 83 10/30/2020 08:14 AM  
 CK - MB 1.2 10/30/2020 08:14 AM  
 CK-MB Index 1.4 10/30/2020 08:14 AM  
 Troponin-I, QT 0.04 10/30/2020 08:14 AM  
 
  
Lactic Acid Thyroid Studies All Micro Results Procedure Component Value Units Date/Time CULTURE, URINE [[id number]] Collected:  11/05/20 1900 Order Status:  Completed Specimen:  Cath Urine Updated:  11/07/20 1039 Special Requests: NO SPECIAL REQUESTS [place] Count --     
  [id number] COLONIES/mL Culture result:    
  MIXED UROGENITAL ENMANUEL ISOLATED Images: 
 
CT (Most Recent). CT Results (most recent): 
Results from Hospital Encounter encounter on 10/29/20 CT HEAD WO CONT Narrative CT OF THE BRAIN 
 
COMPARISON: None. INDICATIONS: Fall and memory loss. TECHNIQUE: Volumetric data acquisition was performed   through the brain on a 
multislice scanner and reconstructed in the axial plane. FINDINGS:   
 
The ventricles and CSF spaces are enlarged consistent with age associated 
atrophy. There is no midline shift. Cathye Fern The brain parenchyma is of generally normal attenuation. There is decreased 
attenuation in the periventricular white matter consistent with presumed 
microvascular disease. There is no hemorrhage evident. There are no pathologic fluid collections. There are no pathologic calcifications. . 
. The visible portions of the paranasal sinuses: Are clear. Impression IMPRESSION:  
 
Atrophy and microvascular disease. No acute intracranial process. All CT scans at this facility are performed using dose optimization technique as 
appropriate to the performed exam, to include automated exposure control, 
adjustment of the mA and/or kV according to patient's size (Including 
appropriate matching for site-specific examinations), or use of iterative 
reconstruction technique. MRI Results (most recent): 
Results from Hospital Encounter encounter on 10/29/20 MRI BRAIN WO CONT Narrative Brain MR without contrast 
 
HISTORY: Confusion, memory loss, hallucinations and falls. COMPARISON: CT 10/29/2020 TECHNIQUE: Brain scanned with sagittal and axial T1W scans, axial T2W , axial 
FLAIR, axial diffusion weighted images and SWAN. FINDINGS: Details are limited by mild-to-moderate motion artifact. Cerebral parenchyma: No restricted diffusion abnormalities to suggest acute 
stroke. No evidence of edema, mass effect or mass lesion. No significant T2 or FLAIR hyperintense abnormalities. Prominent symmetric susceptibility hypointense 
signal in the basal ganglia. Brain volumes and ventricular system: Normal in size and morphology for the 
patient's age. Midline structures: Normal. 
 
Cerebellum: Normal. 
 
Brainstem: Normal. 
 
Vascular system: Expected arterial flow voids are present at the base of brain. Calvarium and skull base: Normal. 
 
Paranasal sinuses and mastoid air cells: Essentially clear. Couple of tiny mucus 
retention cysts in the maxillary sinuses. Visualized orbits: Unremarkable for a nondedicated exam. 
 
Visualized upper cervical spine: Unremarkable for a nondedicated exam. 
  
 Impression IMPRESSION: 
 
1. No acute brain abnormalities. 2.  Symmetric prominent susceptibility artifact in the basal ganglia suggesting 
increased iron/mineral deposition. XRAY (Most Recent) EKG No results found for this or any previous visit. 2D ECHO 10/29/20 ECHO ADULT COMPLETE 10/31/2020 10/31/2020 Narrative · LV: Estimated LVEF is 40 - 45%. Visually measured ejection fraction. Normal cavity size. Mild concentric hypertrophy. · RV: Mildly dilated right ventricle. Mildly reduced systolic function. · PA: Mild pulmonary hypertension. Pulmonary arterial systolic pressure is 46 mmHg. · IVC: Moderately elevated central venous pressure (10-15 mmHg); IVC  
diameter is larger than 21 mm and collapses more than 50% with  
respiration.  
   
  Signed by: Keysha Smith MD

## 2020-11-08 NOTE — PROGRESS NOTES
0709-Bedside and Verbal shift change report received from  Jesus Mcdonnell ,[facility] (off going nurse). Report included the following information SBAR, Kardex, MAR and Recent Results. Assumed care,fall prevention program maintained,call bell and bedside table within reach. Will continue care. 0715-Patient get up from bed. Helped went to bathroom. Patient was able to tolerated walking to bathroom,w/ a very minimal assist. 
 
 
1915-Bedside and Verbal shift change report given to LU Rubi (oncoming nurse) by Bety Manrique RN (offgoing nurse).  Report included the following information SBAR, Kardex, MAR and Recent Results.

## 2020-11-08 NOTE — PROGRESS NOTES
Problem: Falls - Risk of 
Goal: *Absence of Falls Description: Document Gaston Le Fall Risk and appropriate interventions in the flowsheet. Outcome: Progressing Towards Goal 
Note: Fall Risk Interventions: 
Mobility Interventions: Bed/chair exit alarm Mentation Interventions: Door open when patient unattended, Bed/chair exit alarm Medication Interventions: Assess postural VS orthostatic hypotension Elimination Interventions: Bed/chair exit alarm History of Falls Interventions: Bed/chair exit alarm Problem: Patient Education: Go to Patient Education Activity Goal: Patient/Family Education Outcome: Progressing Towards Goal 
  
Problem: Non-Violent Restraints Goal: *No harm/injury to patient while restraints in use Outcome: Progressing Towards Goal

## 2020-11-08 NOTE — PROGRESS NOTES
[address] [facility], [address] [facility] [place] [place] 1946, [address] [facility] 
[email or web address] Impression/Plan: 1. Bleeding DU H pylori gastritis - on Rx Continue current Rx Chief Complaint:  
GI bleed Subjective:   
Pt more alert , no active bleeding Tolerating Abx for H pylori Eyes: conjunctiva normal, EOM normal  
Neck: ROM normal, supple and trachea normal  
Cardiovascular: heart normal, intact distal pulses, normal rate and regular rhythm Pulmonary/Chest Wall: breath sounds normal and effort normal  
Abdominal: appearance normal, bowel sounds normal and soft, non-acute, non-tender Visit Vitals /85 (BP 1 Location: Right arm, BP Patient Position: At rest) Pulse (!) 106 Temp 98 °F (36.7 °C) Resp 17 Ht 5' 9\" (1.753 m) Wt 69.9 kg (154 lb) SpO2 96% BMI 22.74 kg/m² Intake/Output Summary (Last 24 hours) at 11/8/2020 1128 Last data filed at 11/8/2020 0357 Gross per 24 hour Intake 1505.75 ml Output 150 ml Net 1355.75 ml CBC w/Diff Lab Results Component Value Date/Time WBC 9.1 11/07/2020 02:13 AM  
 RBC 2.79 (L) 11/07/2020 02:13 AM  
 HGB 8.8 (L) 11/07/2020 02:13 AM  
 HCT 28.5 (L) 11/07/2020 02:13 AM  
 .2 (H) 11/07/2020 02:13 AM  
 MCH 31.5 11/07/2020 02:13 AM  
 MCHC 30.9 (L) 11/07/2020 02:13 AM  
 RDW 14.4 11/07/2020 02:13 AM  
  11/07/2020 02:13 AM  
 Lab Results Component Value Date/Time GRANS 69 11/05/2020 04:02 AM  
 LYMPH 21 11/05/2020 04:02 AM  
 EOS 1 11/05/2020 04:02 AM  
 BASOS 0 11/05/2020 04:02 AM  
  
Basic Metabolic Profile Recent Labs 11/08/20 
0600 * K 4.5  
* CO2 28 BUN 36*  
CA 8.3* Hepatic Function Lab Results Component Value Date/Time  ALB 3.0 (L) 11/05/2020 04:02 AM  
 TP 6.1 (L) 11/05/2020 04:02 AM  
 AP 53 11/05/2020 04:02 AM  
 No results found for: TBIL  
 Vincenzo Bentley MD, MD 
[facility], [facility] 
[email or web address]

## 2020-11-08 NOTE — PROGRESS NOTES
Problem: Falls - Risk of 
Goal: *Absence of Falls Description: Document Rochele Bill Fall Risk and appropriate interventions in the flowsheet. Outcome: Progressing Towards Goal 
Note: Fall Risk Interventions: 
Mobility Interventions: Bed/chair exit alarm Mentation Interventions: Door open when patient unattended, Bed/chair exit alarm Medication Interventions: Assess postural VS orthostatic hypotension Elimination Interventions: Bed/chair exit alarm History of Falls Interventions: Bed/chair exit alarm Problem: Patient Education: Go to Patient Education Activity Goal: Patient/Family Education Outcome: Progressing Towards Goal 
  
Problem: Non-Violent Restraints Goal: *Removal from restraints as soon as assessed to be safe Outcome: Progressing Towards Goal 
Goal: *No harm/injury to patient while restraints in use Outcome: Progressing Towards Goal 
Goal: *Patient's dignity will be maintained Outcome: Progressing Towards Goal 
Goal: *Patient Specific Goal (EDIT GOAL, INSERT TEXT) Outcome: Progressing Towards Goal 
Goal: Non-violent Restaints:Standard Interventions Outcome: Progressing Towards Goal 
Goal: Non-violent Restraints:Patient Interventions Outcome: Progressing Towards Goal 
Goal: Patient/Family Education Outcome: Progressing Towards Goal 
  
Problem: Nutrition Deficit Goal: *Optimize nutritional status Outcome: Progressing Towards Goal 
  
Problem: Patient Education: Go to Patient Education Activity Goal: Patient/Family Education Outcome: Progressing Towards Goal 
  
Problem: Patient Education: Go to Patient Education Activity Goal: Patient/Family Education Outcome: Progressing Towards Goal 
  
Problem: Pressure Injury - Risk of 
Goal: *Prevention of pressure injury Description: Document Rafa Scale and appropriate interventions in the flowsheet. Outcome: Progressing Towards Goal 
Note: Pressure Injury Interventions: Sensory Interventions: Assess changes in LOC, Assess need for specialty bed Moisture Interventions: Absorbent underpads Activity Interventions: Chair cushion, PT/OT evaluation Mobility Interventions: HOB 30 degrees or less Nutrition Interventions: Discuss nutritional consult with provider Friction and Shear Interventions: Minimize layers Problem: Patient Education: Go to Patient Education Activity Goal: Patient/Family Education Outcome: Progressing Towards Goal

## 2020-11-08 NOTE — PROGRESS NOTES
Problem: Falls - Risk of 
Goal: *Absence of Falls Description: Document Manuel Borjas Fall Risk and appropriate interventions in the flowsheet. Outcome: Progressing Towards Goal 
Note: Fall Risk Interventions: 
Mobility Interventions: Bed/chair exit alarm Mentation Interventions: Door open when patient unattended, Bed/chair exit alarm Medication Interventions: Assess postural VS orthostatic hypotension Elimination Interventions: Bed/chair exit alarm History of Falls Interventions: Bed/chair exit alarm Problem: Patient Education: Go to Patient Education Activity Goal: Patient/Family Education Outcome: Progressing Towards Goal 
  
Problem: Non-Violent Restraints Goal: *Removal from restraints as soon as assessed to be safe Outcome: Progressing Towards Goal 
Goal: *No harm/injury to patient while restraints in use Outcome: Progressing Towards Goal 
Goal: *Patient's dignity will be maintained Outcome: Progressing Towards Goal 
Goal: *Patient Specific Goal (EDIT GOAL, INSERT TEXT) Outcome: Progressing Towards Goal 
Goal: Non-violent Restaints:Standard Interventions Outcome: Progressing Towards Goal 
Goal: Non-violent Restraints:Patient Interventions Outcome: Progressing Towards Goal 
Goal: Patient/Family Education Outcome: Progressing Towards Goal 
  
Problem: Nutrition Deficit Goal: *Optimize nutritional status Outcome: Progressing Towards Goal 
  
Problem: Patient Education: Go to Patient Education Activity Goal: Patient/Family Education Outcome: Progressing Towards Goal 
  
Problem: Patient Education: Go to Patient Education Activity Goal: Patient/Family Education Outcome: Progressing Towards Goal 
  
Problem: Pressure Injury - Risk of 
Goal: *Prevention of pressure injury Description: Document Rafa Scale and appropriate interventions in the flowsheet. Outcome: Progressing Towards Goal 
Note: Pressure Injury Interventions: Sensory Interventions: Assess changes in LOC, Assess need for specialty bed Moisture Interventions: Absorbent underpads Activity Interventions: Chair cushion, PT/OT evaluation Mobility Interventions: HOB 30 degrees or less Nutrition Interventions: Discuss nutritional consult with provider Friction and Shear Interventions: Minimize layers Problem: Patient Education: Go to Patient Education Activity Goal: Patient/Family Education Outcome: Progressing Towards Goal

## 2020-11-09 NOTE — PROGRESS NOTES
0800 Patient attempting to get ut of bed and asking how to get to [address]. Assisted patient to bathroom, patient unable to maneuver walker. 1000 Patient standing up every few minutes after multiple attempts of redirection, CNA sitting at bedside for safety. 1100 Patient refusing to sit back down, redirection not working. Spoke with physician, IM haldol 5 mg one time dose ordered and administered. 1130 Patient still getting up every few minutes and refusing to sit back down, hallucinating and unable to follow commands. Spoke with physician; second 5 mg dose of IM haldol ordered and administered. 1200 Patient still getting up frequently but easier to redirect. 1230 Patient napping, will continue to monitor. 1500 Patient becoming restless again. Constant redirection needed. [place] at bedside for patient safety. 1600 Verbal transfer to telemetry floor orders received, discussed with nursing admin patient safety concerns. Patient needs close monitoring either near nursing station or with a staff member at bedside.

## 2020-11-09 NOTE — PROGRESS NOTES
[email or web address] 
[phone] Gastroenterology follow up-Progress note Impression: 1. GI Bleed s/p EGD 11/3/2020 - antritis, duodenal ulcer with adherent clot, cauterized with bicap. Bx H pylori positive, tolerating abx, no further bleeding 2. Anemia - improving, hgb 8.8 3. A-fib with RVR - lovenox started 11/1/2020 4. Cardiomyopathy - EF 40-45% by echo 10/31/2020 5. New acute systolic HF 6. Encephalopathy - worsening confusion today, ammonia normal 11/5/2020 7. Hx hallucinations, memory loss 8. Hx recreational drug use Plan: 1. Continue abx for H. Pylori 2. Continue PPI 3. Monitor for recurrent bleeding, would consider IR embolization if large volume event. 4. Medical management per primary team 
 
Will sign off-Thank you for this consultation and the opportunity to participate in the care of this patient. Please do not hesitate to call with any questions or concerns, or should event occur that may necessitate additional GI evaluation. Chief Complaint: GI bleed Subjective:  Significant confusion this morning, however denies abdominal pain. No further bleeding per nursing staff. Eyes: conjunctiva normal, EOM normal  
Neck: ROM normal, supple and trachea normal  
Cardiovascular: heart normal, intact distal pulses, normal rate and regular rhythm Pulmonary/Chest Wall: breath sounds normal and effort normal  
Abdominal: appearance normal, bowel sounds normal and soft, non-acute, non-tender Patient Active Problem List  
Diagnosis Code  Atrial fibrillation with rapid ventricular response (HCC) I48.91  
 Acute lower gastrointestinal hemorrhage K92.2  Duodenal ulcer K26.9  Helicobacter pylori gastritis K29.70, B96.81  
 Duodenal ulcer with hemorrhage K26.4 Visit Vitals BP (!) 120/94 Pulse (!) 112 Temp 97.5 °F (36.4 °C) Resp 14 Ht 5' 9\" (1.753 m) Wt 71.5 kg (157 lb 11.2 oz) SpO2 100% BMI 23.29 kg/m² Intake/Output Summary (Last 24 hours) at 11/9/2020 1008 Last data filed at 11/9/2020 7344 Gross per 24 hour Intake 410 ml Output  Net 410 ml CBC w/Diff Lab Results Component Value Date/Time WBC 9.1 11/07/2020 02:13 AM  
 RBC 2.79 (L) 11/07/2020 02:13 AM  
 HGB 8.8 (L) 11/07/2020 02:13 AM  
 HCT 28.5 (L) 11/07/2020 02:13 AM  
 .2 (H) 11/07/2020 02:13 AM  
 MCH 31.5 11/07/2020 02:13 AM  
 MCHC 30.9 (L) 11/07/2020 02:13 AM  
 RDW 14.4 11/07/2020 02:13 AM  
  11/07/2020 02:13 AM  
 Lab Results Component Value Date/Time GRANS 69 11/05/2020 04:02 AM  
 LYMPH 21 11/05/2020 04:02 AM  
 EOS 1 11/05/2020 04:02 AM  
 BASOS 0 11/05/2020 04:02 AM  
  
Basic Metabolic Profile Recent Labs 11/08/20 
0600 * K 4.5  
* CO2 28 BUN 36*  
CA 8.3* Hepatic Function Lab Results Component Value Date/Time ALB 3.0 (L) 11/05/2020 04:02 AM  
 TP 6.1 (L) 11/05/2020 04:02 AM  
 AP 53 11/05/2020 04:02 AM  
 No results found for: TBIL Coags No results for input(s): PTP, INR, APTT, INREXT in the last 72 hours. [facility]. [email or web address] Phone: [phone] Pager: [phone]

## 2020-11-09 NOTE — PROGRESS NOTES
Hospitalist Progress Note Patient: Karolina Morelos Age: 79 y.o. : 1940 MR#: [id number] SSN: [id number] Date/Time: 2020 10:55 AM 
 
DOA: 10/29/2020 PCP: None Subjective: He was found getting out of his chair and tried to ambulate hallway. Not agitated but confused. No vitals change. Nursing at bedside to help with his safety. I called and spoke with Ree Pabon, [phone]) for starting antipsychotic medication at low dose to help with his agitation and impulsiveness Floyd Correa agreed to allow for antipsychotic mediation to help with his behavior. Spoke with her to allow family member to sit with him for his safety. Haria updated that the declined SNF authorization today because he is at level function where they typically discharge patient from SNF.  
 
 
 
ROS:  No current fever/chills, no headache, no dizziness, no facial pain, no sinus congestion, No swallowing pain, No chest pain, no palpitation, no shortness of breath, no abd pain, No diarrhea, no urinary complaint, + leg pain or swelling Assessment/Plan: 1. Acute encephalopathy with visual hallucination, unclear etiology,  
     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 
     -No clear evidence of infection, CT head without acute event. TSH normal 
     -MRI brain without stroke 2. Acute systolic heart failure, elevated proBNP but no evidence of volume overload 3. Paroxymal atrial fibrillation with RVR, in rate control 4. Hypernatremia due to lack of fluid intake 5. Acute renal insufficiency in the setting of lasix use and not maintain oral intake. Hypernatremia, due to decrease oral hydration 6. GI bleed due to Duodenal ulcer, s/p cauterize with bicap, s/p EGD Duodenal ulcer with biopsy showing H.pylori infection 7. Acute anemia due to blood loss, stable Hgb/Hct With Iron deficiency anemia 8.   Alcohol positive on admission but no evidence of alcohol consumption  
      -patient confirmed that he does not drinks alcohol. 
      -negative alcohol level on repeat 9. Fall, mechanical vs syncope 10. Right upper extremity pain due to fall, resolved 11. H/o tobacco abuse 12. Right lower leg chronic wound, no infection ? PAD Spoke with daughter of starting haldol to help with his behavior problem. Will continue to require sitter to help with his safety. Fall precaution in placed. Note adriana declined his SNF authorization, ICM with speak with family for placement option for discharge He will needs 24 hours supervision for safety with his care. He is currently stable. Neurology concerns for dementia that was acute worsened by his recent medical illness. No further recommendation from neurology. Prognosis of his neurological recovery is poor since his symptom has been since prior to June as reported by family Avoid Benzo for now (consider seroquel or haldol for agitation) Cont Amoxicillin/clarithromycin/PP for H.pylori Stop ciprofloxacin, Continue flomax. Hgb/Hct monitoring, transfuse in if Hgb<7. Cont PPI orally Avoid lovenox Hold lasix, will resume tomorrow Limited 1.5 liter fluid. Avoid salt. IO monitoring. Cont with BBlocker. Cardiology follows but no invasive workup at this time. Will need to resume ASA in 5 days if bleeding risk remains low Cont to advance diet. Cont oral medications. Cont sitter at bedside for safety. Family can visit to assist with sitter if allowed by nursing supervisor Nutritional support Full code Disposition planning: spoke with ICM for placement planning Spoke with April Patino with clinical updates and plan of care. Additional Notes:   
Time spent >30 minutes Case discussed with:  [x]Patient  [x]Family  [x]Nursing  [x]Case Management DVT Prophylaxis:  []Lovenox  []Hep SQ  [x]SCDs  []Coumadin   []On Heparin gtt Signed By: Maryjane Corrales MD   
 2020 10:55 AM  
  
 
 
 
 
Objective:  
VS:  
Visit Vitals BP (!) 120/94 Pulse (!) 112 Temp 97.5 °F (36.4 °C) Resp 14 Ht 5' 9\" (1.753 m) Wt 71.5 kg (157 lb 11.2 oz) SpO2 100% BMI 23.29 kg/m² Tmax/24hrs: Temp (24hrs), Av.8 °F (36.6 °C), Min:97.5 °F (36.4 °C), Max:98 °F (36.7 °C) Intake/Output Summary (Last 24 hours) at 2020 1055 Last data filed at 2020 0471 Gross per 24 hour Intake 410 ml Output  Net 410 ml Tele: afib General:  Cooperative, Not in acute distress, HEENT: PERRL, EOMI, supple neck, no JVD, dry oral mucosa Cardiovascular: S1S2 regular, no rub/gallop Pulmonary: air entry bilaterally, no wheezing, + crackle GI:  Soft, non tender, non distended, +bs, no guarding Extremities:  No pedal edema, +distal pulses appreciated Chronic right leg wound Neuro: AOx2, moving all extremities Additional:  
 
 
Current Facility-Administered Medications Medication Dose Route Frequency  haloperidol lactate (HALDOL) injection 5 mg  5 mg IntraMUSCular ONCE  
 influenza vaccine  (6 mos+)(PF) (FLUARIX/FLULAVAL/FLUZONE QUAD) injection 0.5 mL  0.5 mL IntraMUSCular PRIOR TO DISCHARGE  amoxicillin (AMOXIL) capsule 1,000 mg  1,000 mg Oral Q12H  clarithromycin (BIAXIN) 250 mg/5 mL oral suspension 500 mg  500 mg Oral Q12H  pantoprazole (PROTONIX) tablet 40 mg  40 mg Oral BID  thiamine HCL (B-1) tablet 100 mg  100 mg Oral DAILY  sucralfate (CARAFATE) tablet 1 g  1 g Oral AC&HS  
 metoprolol tartrate (LOPRESSOR) tablet 50 mg  50 mg Oral Q12H  
 metoprolol (LOPRESSOR) injection 5 mg  5 mg IntraVENous Q4H PRN  
 sodium chloride (NS) flush 5-40 mL  5-40 mL IntraVENous Q8H  
 sodium chloride (NS) flush 5-40 mL  5-40 mL IntraVENous PRN  
 [Held by provider] furosemide (LASIX) tablet 20 mg  20 mg Oral DAILY  hydrALAZINE (APRESOLINE) 20 mg/mL injection 10 mg  10 mg IntraVENous Q6H PRN  
  [Held by provider] aspirin chewable tablet 81 mg  81 mg Oral DAILY  therapeutic multivitamin (THERAGRAN) tablet 1 Tab  1 Tab Oral DAILY  folic acid (FOLVITE) tablet 1 mg  1 mg Oral DAILY  sodium chloride (NS) flush 5-40 mL  5-40 mL IntraVENous Q8H  
 sodium chloride (NS) flush 5-40 mL  5-40 mL IntraVENous PRN  
 acetaminophen (TYLENOL) tablet 650 mg  650 mg Oral Q6H PRN Or  
 acetaminophen (TYLENOL) suppository 650 mg  650 mg Rectal Q6H PRN  polyethylene glycol (MIRALAX) packet 17 g  17 g Oral DAILY PRN  promethazine (PHENERGAN) tablet 12.5 mg  12.5 mg Oral Q6H PRN Or  
 ondansetron (ZOFRAN) injection 4 mg  4 mg IntraVENous Q6H PRN Lab/Data Review: 
Labs: Results:  
   
Chemistry Recent Labs 11/08/20 
0600 11/07/20 
5680 * 113* * 149*  
K 4.5 3.9 * 115* CO2 28 27 BUN 36* 42* CREA 1.09 1.21  
BUCR 33* 35* AGAP 7 7 CA 8.3* 8.8 No results for input(s): TBIL, ALT, ALKP, TP, ALB, GLOB, AGRAT in the last 72 hours. No lab exists for component: SGOT  
CBC w/Diff Recent Labs 11/07/20 
4266 WBC 9.1  
RBC 2.79* HGB 8.8* HCT 28.5*  
.2*  
MCH 31.5 MCHC 30.9*  
RDW 14.4  Coagulation No results for input(s): PTP, INR, APTT, INREXT, INREXT in the last 72 hours. Iron/Ferritin Lab Results Component Value Date/Time Iron 48 (L) 11/03/2020 12:55 AM  
 TIBC 232 (L) 11/03/2020 12:55 AM  
 Iron % saturation 21 11/03/2020 12:55 AM  
 Ferritin 63 11/03/2020 12:55 AM  
   
BNP Cardiac Enzymes Lab Results Component Value Date/Time CK 83 10/30/2020 08:14 AM  
 CK - MB 1.2 10/30/2020 08:14 AM  
 CK-MB Index 1.4 10/30/2020 08:14 AM  
 Troponin-I, QT 0.04 10/30/2020 08:14 AM  
 
  
Lactic Acid Thyroid Studies All Micro Results Procedure Component Value Units Date/Time CULTURE, URINE [[id number]] Collected:  11/05/20 1900 Order Status:  Completed Specimen:  Cath Urine Updated:  11/07/20 1039 Special Requests: NO SPECIAL REQUESTS [place] Count --     
  10313 COLONIES/mL Culture result:    
  MIXED UROGENITAL ENMANUEL ISOLATED Images: 
 
CT (Most Recent). CT Results (most recent): 
Results from Hospital Encounter encounter on 10/29/20 CT HEAD WO CONT Narrative CT OF THE BRAIN 
 
COMPARISON: None. INDICATIONS: Fall and memory loss. TECHNIQUE: Volumetric data acquisition was performed   through the brain on a 
multislice scanner and reconstructed in the axial plane. FINDINGS:   
 
The ventricles and CSF spaces are enlarged consistent with age associated 
atrophy. There is no midline shift. Carlos Drones The brain parenchyma is of generally normal attenuation. There is decreased 
attenuation in the periventricular white matter consistent with presumed 
microvascular disease. There is no hemorrhage evident. There are no pathologic fluid collections. There are no pathologic calcifications. . 
. The visible portions of the paranasal sinuses: Are clear. Impression IMPRESSION:  
 
Atrophy and microvascular disease. No acute intracranial process. All CT scans at this facility are performed using dose optimization technique as 
appropriate to the performed exam, to include automated exposure control, 
adjustment of the mA and/or kV according to patient's size (Including 
appropriate matching for site-specific examinations), or use of iterative 
reconstruction technique. MRI Results (most recent): 
Results from Hospital Encounter encounter on 10/29/20 MRI BRAIN WO CONT Narrative Brain MR without contrast 
 
HISTORY: Confusion, memory loss, hallucinations and falls. COMPARISON: CT 10/29/2020 TECHNIQUE: Brain scanned with sagittal and axial T1W scans, axial T2W , axial 
FLAIR, axial diffusion weighted images and SWAN. FINDINGS: Details are limited by mild-to-moderate motion artifact. Cerebral parenchyma: No restricted diffusion abnormalities to suggest acute 
stroke. No evidence of edema, mass effect or mass lesion. No significant T2 or FLAIR hyperintense abnormalities. Prominent symmetric susceptibility hypointense 
signal in the basal ganglia. Brain volumes and ventricular system: Normal in size and morphology for the 
patient's age. Midline structures: Normal. 
 
Cerebellum: Normal. 
 
Brainstem: Normal. 
 
Vascular system: Expected arterial flow voids are present at the base of brain. Calvarium and skull base: Normal. 
 
Paranasal sinuses and mastoid air cells: Essentially clear. Couple of tiny mucus 
retention cysts in the maxillary sinuses. Visualized orbits: Unremarkable for a nondedicated exam. 
 
Visualized upper cervical spine: Unremarkable for a nondedicated exam. 
  
 Impression IMPRESSION: 
 
1. No acute brain abnormalities. 2.  Symmetric prominent susceptibility artifact in the basal ganglia suggesting 
increased iron/mineral deposition. XRAY (Most Recent) EKG No results found for this or any previous visit. 2D ECHO 10/29/20 ECHO ADULT COMPLETE 10/31/2020 10/31/2020 Narrative · LV: Estimated LVEF is 40 - 45%. Visually measured ejection fraction. Normal cavity size. Mild concentric hypertrophy. · RV: Mildly dilated right ventricle. Mildly reduced systolic function. · PA: Mild pulmonary hypertension. Pulmonary arterial systolic pressure is 46 mmHg. · IVC: Moderately elevated central venous pressure (10-15 mmHg); IVC  
diameter is larger than 21 mm and collapses more than 50% with  
respiration.  
   
  Signed by: Yareli Mehta MD
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
## 2020-11-09 NOTE — PROGRESS NOTES
Problem: Self Care Deficits Care Plan (Adult) Goal: *Acute Goals and Plan of Care (Insert Text) Description: Occupational Therapy Goals Initiated 11/1/2020 within 7 day(s). Re-evaluated 11/7/20. Goal 1 Met, continue with all remaining goals. 1.  Patient will perform grooming standing at the sink with supervision/set-up for 5 minutes. (Goal met 11/7/20) 2. Patient will perform upper body dressing with supervision/set-up utilizing compensatory strategy. 3.  Patient will perform lower body dressing with supervision/set-up utilizing energy conservation techniques and/or AE as needed. 4.  Patient will perform toilet transfers with Supervision. 5.  Patient will perform all aspects of toileting with supervision/set-up. 6.  Patient will utilize energy conservation techniques during functional activities with verbal cues. Prior Level of Function: Pt reports living alone in private residence with family nearby. Pt reports being previously independent with I/ADLs, including driving. Pt has no DME available at home. Per conversation with pt's daughter pt has shower chair that he uses daily for bathing. Outcome: Not Progressing Towards Goal 
 OCCUPATIONAL THERAPY TREATMENT Patient: Sarkis Shannon (36 y.o. male) Date: 11/9/2020 Diagnosis: Atrial fibrillation with rapid ventricular response ([address] [I48.91] Atrial fibrillation with rapid ventricular response ([address] Procedure(s) (LRB): ENDOSCOPY w cautery w injection w bx's (N/A) 6 Days Post-Op Precautions: Fall, Skin PLOF: Pt reports living alone in private residence with family nearby. Pt reports being previously independent with I/ADLs, including driving. Pt has no DME available at home. Per conversation with pt's daughter pt has shower chair that he uses daily for bathing. Chart, occupational therapy assessment, plan of care, and goals were reviewed. ASSESSMENT: 
Pt cleared by RN for OT tx at this time.  Pt presented sitting upright in reclining chair with no caregiver present. Attempted to assess LB dressing task donning/doffing socks providing verbal and visual cueing. Pt would take the sock and wipe off with cleaning wipe mult times or would try and put over knee. Pt observed to be having some hallucinations, talking to dog in the corner of room. Pt restless throughout tx session wanting to stand up. Pt performed STS transfers CGA with HHA but then would just suddenly sit back down. Pt is unsafe at this time to continue skilled therapy session for today 2/2 increased confusion, decreased command following, and hallucinations, RN made aware. Pt left sitting in reclining chair, chair alarm active, and all needs left within reach. Progression toward goals: 
[]          Improving appropriately and progressing toward goals [x]          Improving slowly and progressing toward goals 
[]          Not making progress toward goals and plan of care will be adjusted PLAN: 
Patient continues to benefit from skilled intervention to address the above impairments. Continue treatment per established plan of care. Discharge Recommendations:  24/7 Supervision from family members when pt is discharged if unable to go to Rehab. Further Equipment Recommendations for Discharge:  RW  
 
SUBJECTIVE:  
Patient stated  Look at that cute dog over there. \" OBJECTIVE DATA SUMMARY:  
Cognitive/Behavioral Status: 
Neurologic State: Confused Orientation Level: Oriented to person, Oriented to place Cognition: Decreased attention/concentration, Decreased command following Safety/Judgement: Decreased insight into deficits, Fall prevention, Home safety, Decreased awareness of environment Functional Mobility and Transfers for ADLs: 
  
 Transfers: 
Sit to Stand: Contact guard assistance Stand to Sit: Contact guard assistance Balance: 
Sitting: Intact Standing: Impaired Standing - Static: Fair Pain: 
Pt reports no pain at this time. Activity Tolerance:   
Poor 2/2 confusion Please refer to the flowsheet for vital signs taken during this treatment. After treatment:  
[x]  Patient left in no apparent distress sitting up in chair 
[]  Patient left in no apparent distress in bed 
[x]  Call bell left within reach [x]  Nursing notified 
[]  Caregiver present [x]  Bed alarm activated COMMUNICATION/EDUCATION:  
[] Role of Occupational Therapy in the acute care setting 
[] Home safety education was provided and the patient/caregiver indicated understanding. [] Patient/family have participated as able in working towards goals and plan of care. [] Patient/family agree to work toward stated goals and plan of care. [] Patient understands intent and goals of therapy, but is neutral about his/her participation. [x] Patient is unable to participate in goal setting and plan of care. Thank you for this referral. 
TAMI Smith Time Calculation: 24 mins

## 2020-11-09 NOTE — PROGRESS NOTES
Spoke with OT, who states patient was very confused, hallucinating. Patient not appropriate at this time for PT. Will follow up at a later time.   
 
Mendoza Mendse PT, DPT

## 2020-11-09 NOTE — PROGRESS NOTES
1050-  called [facility] [phone] and spoke with Vito Wagner. Per Cari pt's Peer to peer has been denied d/t no need for daily skilled nursing. Cari provided  with an appeal number if the family would like to appeal. [phone] 
 
1055-  called admissions at [place]. Randall Pelletier currently not available. CM spoke to [facility], inquiring about out of pocket costs for a skilled bed. Per [facility], Skilled nursing beds are $275/day and $233/day for longterm care. They would need to see 6 months of bank statements to ensure ability to pay. 1107-  called and spoke to pt's daughter Brigido Maurer. CM informed her of the skilled nursing facility [place] [facility]. Brigido Maurer verbalized her frustration, stating \"He can't go home. \" She reports that there \"is no where for him to go. \" She asked questions as to why [facility] denied the request. CM informed her that [facility] did not see a need for daily skilled nursing care and that they provided her with a number for them to appeal. CM asked if she would like the number. Brigido Maurer stated, \"well if the doctor said he can't be alone and he was still denied. I don't see the need. \" CM discussed paying out of pocket for a nursing facility or personal care. Brigido Maurer reports that pt does not have the funds to pay for either. She reports that he has an income of about $1500/mos. She asked if he would qualify for medicaid. CM informed her that she would reach out to [facility] to call her to discuss further. CM informed her that this would not be instant if he did qualify and we would still need to figure out a discharge plan. CM asked if pt could stay with one of his daughters and they could take turns caring for him and pay for personal care when they are not working. Per Brigido Maurer, they all work and do not have the ability for him to stay with them. She states \"she should not have to explain there living arrangement\". Brigido Maurer reports that she is going to discuss this with her other sisters and call CM back later. 1124- [facility] to reach out to [place] to determine Longterm care medicaid eligibility. 1128- informed Dr Areli Werner of the above conversation. 1- Pt's daughter Daily called  back. She asked for the [employer] appeal number.  provided her the number. [place] states she will be calling Mary, Dr Areli Werner and the heart doctor. Stating, \"He's not going anywhere but a healthcare facility. \" 
 
 
Zhou Becerra RN BSN Care Manager [phone]

## 2020-11-09 NOTE — PROGRESS NOTES
Bedside and Verbal shift change report given to Sammy Flood RN (oncoming nurse) by Dannielle Alexandra RN (offgoing nurse). Report included the following information SBAR, Kardex, Intake/Output, MAR, Recent Results and Cardiac Rhythm ST.

## 2020-11-09 NOTE — PROGRESS NOTES
Nutrition Assessment Type and Reason for Visit: Reassess, Consult(Oral Nutrition Supplements) Nutrition Recommendations/Plan: - Add supplements: Magic Cup, once daily in addition to [employer], BID 
- Monitor and encourage po intake as tolerated. Nutrition Assessment:  Fair intake of recent meals. Pt confused requiring help and some assistance/encouragement with meals. 50% of supplement intake noted. Wt of 151# using standing scale 11/6- appears accurate. Malnutrition Assessment: 
Malnutrition Status: No malnutrition Estimated Daily Nutrient Needs: 
Energy (kcal):  6597-1807 Protein (g):  58-72 Fluid (ml/day):  3490-4338 Nutrition Related Findings:  BM 11/6. Folic acid, MVI & thiamine. Confusion. Hypernatremia 11/8/20- not rechecked- encourage po intake Current Nutrition Therapies: DIET CARDIAC Regular DIET NUTRITIONAL SUPPLEMENTS Breakfast, Dinner; Ensure [employer] Anthropometric Measures: 
· Height:  5' 9\" (175.3 cm) · Current Body Wt:  71.2 kg (157 lb) · BMI: 23.2 Nutrition Diagnosis: · Predicted inadequate energy intake related to cognitive or neurological impairment as evidenced by intake 26-50%(fair intake of supplements) Nutrition Intervention: 
Food and/or Nutrient Delivery: Continue current diet, Modify oral nutrition supplement, Vitamin supplement Nutrition Education and Counseling: No recommendations at this time Coordination of Nutrition Care: Continue to monitor while inpatient Goals: 
PO nutrition intake will meet >75% of patient estimated nutritional needs within the next 7 days. Nutrition Monitoring and Evaluation:  
Behavioral-Environmental Outcomes: None identified Food/Nutrient Intake Outcomes: Diet advancement/tolerance, Food and nutrient intake, Supplement intake, Vitamin/mineral intake Physical Signs/Symptoms Outcomes: Biochemical data, Chewing or swallowing, Meal time behavior, Nutrition focused physical findings Discharge Planning:   
Continue oral nutrition supplement, Continue current diet Electronically signed by Sofia Alva RD on 11/9/2020 at 12:16 PM 
 
Contact Number: [phone]

## 2020-11-09 NOTE — PROGRESS NOTES
Bedside and Verbal shift change report given to Wilfred Verma RN (oncoming nurse) by Desi Pierre RN (offgoing nurse). Report included the following information SBAR, Kardex, Intake/Output, MAR, Recent Results and Cardiac Rhythm SR/ST.

## 2020-11-10 NOTE — ROUTINE PROCESS
TRANSFER - IN REPORT: 
 
Verbal report received from cvt rn(name) on tSalin Talavera  being received from cvt stepdown(unit) for routine progression of care Report consisted of patients Situation, Background, Assessment and  
Recommendations(SBAR). Information from the following report(s) MAR and Recent Results was reviewed with the receiving nurse. Opportunity for questions and clarification was provided. Assessment completed upon patients arrival to unit and care assumed. Assumed care of patient with sitter 0036 
8 beats of v tach 
 
0328 
9 beats of v tach Bedside and Verbal shift change report given to aneta benton (oncoming nurse) by Desean Reagan RN (offgoing nurse). Report given with SBAR, Kardex, Intake/Output, MAR, Accordion and Recent Results.

## 2020-11-10 NOTE — PROGRESS NOTES
Sascha Ferraro  Daughter  [phone] Called daughter Ryan Douglass about [employer] appeal.  Kelsi Byrne stated that she called [employer] to do the appeal and only got a recording to do the appeal online. Kelsi Byrne stated she was going to do the online appeal today. Kelsi Byrne understands that a Medicaid application need done and is awaiting for [employer] to call her. Called [employer] and left a message to call patients daughter Kelsi Byrne to start Medicaid application. Informed daughter that patient cannot stay at [facility] during the [facility] application process which takes about 45 days. Daughter stated that patient owns a very old trailer and car and understands it will need to be sold. Daughter not willing to take patient home with family due to everyone working everyday. Melida Kramer RN BSN Care Manager [phone]

## 2020-11-10 NOTE — PROGRESS NOTES
Hospitalist Progress Note Patient: Higinio Harp Age: 79 y.o. : 1940 MR#: [id number] SSN: [id number] Date/Time: 11/10/2020 3:38 PM 
 
DOA: 10/29/2020 PCP: None Subjective:  
 
Patient has been transferred out of Stepdown unit. He is current on [facility]. He is found to be in his room by himself, sitting in his chair. He asked to know how to use the phone. No sitter needed with him today. He participated with PT today, ambulated 30 ft with standby assistance Follow up re-evaluated him around his lunch time, he was found sitting in chair eating his lunch. No report from nursing of agitation or violence behavior This MD attempted to call out to his daughter Beto Glass, [phone]), no answer by phone This MD attempted again to reach his other daughter Luigi Morgan [phone]), no answer by phone This MD spoke with [facility] for discharge plan and placement plan as he will need 24hrs supervision. Mary has declined SNF authorization. ROS: limited but able to answer No current fever/chills, no headache, no dizziness, no facial pain No swallowing pain, No chest pain, no palpitation, no shortness of breath, no abd pain, No diarrhea, no urinary complaint, no leg pain or swelling Assessment/Plan: 1. 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 
     -No clear evidence of infection, CT head without acute event. TSH normal 
     -MRI brain without stroke 2. Acute systolic heart failure, elevated proBNP but no evidence of volume overload With indeterminate troponin elevation on admission 3. Paroxymal atrial fibrillation with RVR, in rate control 4. Hypernatremia due to lack of fluid intake 5. Acute renal insufficiency in the setting of lasix use and not maintain oral intake. Hypernatremia, due to decrease oral hydration 6. GI bleed due to Duodenal ulcer, s/p cauterize with bicap, s/p EGD Duodenal ulcer with biopsy showing H.pylori infection 7. Acute anemia due to blood loss, stable Hgb/Hct With Iron deficiency anemia 8. Alcohol positive on admission but no evidence of alcohol consumption  
      -patient confirmed that he does not drinks alcohol. 
      -negative alcohol level on repeat 9. Fall, mechanical vs syncope 10. Right upper extremity pain due to fall, resolved 11. H/o tobacco abuse 12. Right lower leg chronic wound, no infection ? PAD He was started on low dose of haldol yesterday after discussion with his daughter. Will continue same dosing of haldol for now. Close monitor and re-check EKG Can d/c sitter but continue to monitor for fall precaution Agree with PT/OT assessment, he will likely need 24hrs supervision for safety concern. He is at functional level that Juan Manuel Burnette does not think will need SNF rehab. Though he can continue to have PT/OT at home. He is currently stable. Neurology concerns for dementia that was acute worsened by his recent medical illness. No further recommendation from neurology. Prognosis of his neurological recovery is poor since his symptom has been since prior to June as reported by family Avoid Benzo for now Cont Amoxicillin/clarithromycin/PP for H.pylori Continue flomax. Hgb/Hct monitoring, transfuse in if Hgb<7. Cont PPI orally Avoid lovenox Can resume lasix every other day for now Limited 1.5 liter fluid. Avoid salt. IO monitoring. Cont with BBlocker. Cardiology follows but no invasive workup at this time. Will need to resume ASA in 5 days if bleeding risk remains low Cont to advance diet. Cont oral medications. Nutritional support Full code Disposition planning: spoke with ICM for placement planning Additional Notes:   
Time spent >30 minutes Case discussed with:  [x]Patient  [x]Family  [x]Nursing  [x]Case Management DVT Prophylaxis:  []Lovenox  []Hep SQ  [x]SCDs  []Coumadin   []On Heparin gtt Signed By: Daniel King MD   
 November 10, 2020 3:38 PM  
  
 
 
 
 
Objective:  
VS:  
Visit Vitals /60 Pulse 61 Temp 98.4 °F (36.9 °C) Resp 20 Ht 5' 9\" (1.753 m) Wt 71.5 kg (157 lb 11.2 oz) SpO2 96% BMI 23.29 kg/m² Tmax/24hrs: Temp (24hrs), Av.8 °F (36.6 °C), Min:97.3 °F (36.3 °C), Max:98.4 °F (36.9 °C) Intake/Output Summary (Last 24 hours) at 11/10/2020 1538 Last data filed at 11/10/2020 2870 Gross per 24 hour Intake 240 ml Output  Net 240 ml Tele: afib General:  Cooperative, Not in acute distress, HEENT: PERRL, EOMI, supple neck, no JVD, dry oral mucosa Cardiovascular: S1S2 regular, no rub/gallop Pulmonary: air entry bilaterally, no wheezing, + crackle GI:  Soft, non tender, non distended, +bs, no guarding Extremities:  No pedal edema, +distal pulses appreciated Chronic right leg wound Neuro: AOx2, moving all extremities Additional:  
 
 
Current Facility-Administered Medications Medication Dose Route Frequency  haloperidoL (HALDOL) tablet 5 mg  5 mg Oral BID  influenza vaccine  (6 mos+)(PF) (FLUARIX/FLULAVAL/FLUZONE QUAD) injection 0.5 mL  0.5 mL IntraMUSCular PRIOR TO DISCHARGE  amoxicillin (AMOXIL) capsule 1,000 mg  1,000 mg Oral Q12H  clarithromycin (BIAXIN) 250 mg/5 mL oral suspension 500 mg  500 mg Oral Q12H  pantoprazole (PROTONIX) tablet 40 mg  40 mg Oral BID  thiamine HCL (B-1) tablet 100 mg  100 mg Oral DAILY  sucralfate (CARAFATE) tablet 1 g  1 g Oral AC&HS  
 metoprolol tartrate (LOPRESSOR) tablet 50 mg  50 mg Oral Q12H  
 metoprolol (LOPRESSOR) injection 5 mg  5 mg IntraVENous Q4H PRN  
 sodium chloride (NS) flush 5-40 mL  5-40 mL IntraVENous Q8H  
 sodium chloride (NS) flush 5-40 mL  5-40 mL IntraVENous PRN  
  [Held by provider] furosemide (LASIX) tablet 20 mg  20 mg Oral DAILY  hydrALAZINE (APRESOLINE) 20 mg/mL injection 10 mg  10 mg IntraVENous Q6H PRN  
 [Held by provider] aspirin chewable tablet 81 mg  81 mg Oral DAILY  therapeutic multivitamin (THERAGRAN) tablet 1 Tab  1 Tab Oral DAILY  folic acid (FOLVITE) tablet 1 mg  1 mg Oral DAILY  sodium chloride (NS) flush 5-40 mL  5-40 mL IntraVENous Q8H  
 sodium chloride (NS) flush 5-40 mL  5-40 mL IntraVENous PRN  
 acetaminophen (TYLENOL) tablet 650 mg  650 mg Oral Q6H PRN Or  
 acetaminophen (TYLENOL) suppository 650 mg  650 mg Rectal Q6H PRN  polyethylene glycol (MIRALAX) packet 17 g  17 g Oral DAILY PRN  promethazine (PHENERGAN) tablet 12.5 mg  12.5 mg Oral Q6H PRN Or  
 ondansetron (ZOFRAN) injection 4 mg  4 mg IntraVENous Q6H PRN Lab/Data Review: 
Labs: Results:  
   
Chemistry Recent Labs 11/08/20 
0600 * * K 4.5  
* CO2 28 BUN 36* CREA 1.09  
BUCR 33* AGAP 7  
CA 8.3* No results for input(s): TBIL, ALT, ALKP, TP, ALB, GLOB, AGRAT in the last 72 hours. No lab exists for component: SGOT  
CBC w/Diff No results for input(s): WBC, RBC, HGB, HCT, MCV, MCH, MCHC, RDW, PLT, BANDS, GRANS, LYMPH, EOS, HGBEXT, HCTEXT, PLTEXT, HGBEXT, HCTEXT, PLTEXT in the last 72 hours. Coagulation No results for input(s): PTP, INR, APTT, INREXT, INREXT in the last 72 hours. Iron/Ferritin Lab Results Component Value Date/Time Iron 48 (L) 11/03/2020 12:55 AM  
 TIBC 232 (L) 11/03/2020 12:55 AM  
 Iron % saturation 21 11/03/2020 12:55 AM  
 Ferritin 63 11/03/2020 12:55 AM  
   
BNP Cardiac Enzymes Lab Results Component Value Date/Time CK 83 10/30/2020 08:14 AM  
 CK - MB 1.2 10/30/2020 08:14 AM  
 CK-MB Index 1.4 10/30/2020 08:14 AM  
 Troponin-I, QT 0.04 10/30/2020 08:14 AM  
 
  
Lactic Acid Thyroid Studies All Micro Results Procedure Component Value Units Date/Time CULTURE, URINE [[id number]] Collected:  11/05/20 1900 Order Status:  Completed Specimen:  Cath Urine Updated:  11/07/20 1039 Special Requests: NO SPECIAL REQUESTS Bumpass Count --     
  [id number] COLONIES/mL Culture result:    
  MIXED UROGENITAL ENMANUEL ISOLATED Images: 
 
CT (Most Recent). CT Results (most recent): 
Results from Hospital Encounter encounter on 10/29/20 CT HEAD WO CONT Narrative CT OF THE BRAIN 
 
COMPARISON: None. INDICATIONS: Fall and memory loss. TECHNIQUE: Volumetric data acquisition was performed   through the brain on a 
multislice scanner and reconstructed in the axial plane. FINDINGS:   
 
The ventricles and CSF spaces are enlarged consistent with age associated 
atrophy. There is no midline shift. Thomasena Dilling The brain parenchyma is of generally normal attenuation. There is decreased 
attenuation in the periventricular white matter consistent with presumed 
microvascular disease. There is no hemorrhage evident. There are no pathologic fluid collections. There are no pathologic calcifications. . 
. The visible portions of the paranasal sinuses: Are clear. Impression IMPRESSION:  
 
Atrophy and microvascular disease. No acute intracranial process. All CT scans at this facility are performed using dose optimization technique as 
appropriate to the performed exam, to include automated exposure control, 
adjustment of the mA and/or kV according to patient's size (Including 
appropriate matching for site-specific examinations), or use of iterative 
reconstruction technique. MRI Results (most recent): 
Results from Hospital Encounter encounter on 10/29/20 MRI BRAIN WO CONT Narrative Brain MR without contrast 
 
HISTORY: Confusion, memory loss, hallucinations and falls. COMPARISON: CT 10/29/2020 TECHNIQUE: Brain scanned with sagittal and axial T1W scans, axial T2W , axial 
FLAIR, axial diffusion weighted images and SWAN. FINDINGS: Details are limited by mild-to-moderate motion artifact. Cerebral parenchyma: No restricted diffusion abnormalities to suggest acute 
stroke. No evidence of edema, mass effect or mass lesion. No significant T2 or FLAIR hyperintense abnormalities. Prominent symmetric susceptibility hypointense 
signal in the basal ganglia. Brain volumes and ventricular system: Normal in size and morphology for the 
patient's age. Midline structures: Normal. 
 
Cerebellum: Normal. 
 
Brainstem: Normal. 
 
Vascular system: Expected arterial flow voids are present at the base of brain. Calvarium and skull base: Normal. 
 
Paranasal sinuses and mastoid air cells: Essentially clear. Couple of tiny mucus 
retention cysts in the maxillary sinuses. Visualized orbits: Unremarkable for a nondedicated exam. 
 
Visualized upper cervical spine: Unremarkable for a nondedicated exam. 
  
 Impression IMPRESSION: 
 
1. No acute brain abnormalities. 2.  Symmetric prominent susceptibility artifact in the basal ganglia suggesting 
increased iron/mineral deposition. XRAY (Most Recent) EKG No results found for this or any previous visit. 2D ECHO 10/29/20 ECHO ADULT COMPLETE 10/31/2020 10/31/2020 Narrative · LV: Estimated LVEF is 40 - 45%. Visually measured ejection fraction. Normal cavity size. Mild concentric hypertrophy. · RV: Mildly dilated right ventricle. Mildly reduced systolic function. · PA: Mild pulmonary hypertension. Pulmonary arterial systolic pressure is 46 mmHg. · IVC: Moderately elevated central venous pressure (10-15 mmHg); IVC  
diameter is larger than 21 mm and collapses more than 50% with  
respiration.  
   
  Signed by: Elissa Randall MD

## 2020-11-10 NOTE — ROUTINE PROCESS
1015 Bedside and Verbal shift change report given to TEJA John RN  by Erendira Wang RN . Report included the following information SBAR, Kardex, Intake/Output, MAR and Recent Results.

## 2020-11-10 NOTE — PROGRESS NOTES
Problem: Self Care Deficits Care Plan (Adult) Goal: *Acute Goals and Plan of Care (Insert Text) Description: Occupational Therapy Goals Initiated 11/1/2020 within 7 day(s). Re-evaluated 11/7/20. Goal 1 Met, continue with all remaining goals. 1.  Patient will perform grooming standing at the sink with supervision/set-up for 5 minutes. (Goal met 11/7/20) 2. Patient will perform upper body dressing with supervision/set-up utilizing compensatory strategy. 3.  Patient will perform lower body dressing with supervision/set-up utilizing energy conservation techniques and/or AE as needed. 4.  Patient will perform toilet transfers with Supervision. 5.  Patient will perform all aspects of toileting with supervision/set-up. 6.  Patient will utilize energy conservation techniques during functional activities with verbal cues. Prior Level of Function: Pt reports living alone in private residence with family nearby. Pt reports being previously independent with I/ADLs, including driving. Pt has no DME available at home. Per conversation with pt's daughter pt has shower chair that he uses daily for bathing. Outcome: Progressing Towards Goal 
 OCCUPATIONAL THERAPY TREATMENT Patient: Amanda Sarkar (49 y.o. male) Date: 11/10/2020 Diagnosis: Atrial fibrillation with rapid ventricular response ([address] [I48.91] Atrial fibrillation with rapid ventricular response ([address] Procedure(s) (LRB): ENDOSCOPY w cautery w injection w bx's (N/A) 7 Days Post-Op Precautions: Fall, Skin Chart, occupational therapy assessment, plan of care, and goals were reviewed. ASSESSMENT: 
Pt standing upon entry. Oriented to self only. Decrease command following/attention. Pt demonstrates good balance w/functional mobility and item retrieval in multiple planes. Pt requires Max Assist w/UB dressing task 2/2 decrease command following. Pt not following commands for return to chair. Alerted Tab CARDENAS and Arleth Moore.  Pt may perform better in familiar environment w/family members. Progression toward goals: 
[]          Improving appropriately and progressing toward goals [x]          Improving slowly and progressing toward goals 
[]          Not making progress toward goals and plan of care will be adjusted PLAN: 
Patient continues to benefit from skilled intervention to address the above impairments. Continue treatment per established plan of care. Discharge Recommendations:  Home Health w/24 hr assist/supervision vs Skilled Nursing Facility/LTC Further Equipment Recommendations for Discharge:  shower chair SUBJECTIVE:  
Patient stated Ok, this order is ready.  OBJECTIVE DATA SUMMARY:  
Cognitive/Behavioral Status: 
Neurologic State: Alert, Confused Orientation Level: Oriented to person Cognition: Decreased attention/concentration, Decreased command following Safety/Judgement: Fall prevention Functional Mobility and Transfers for ADLs: 
 Transfers: 
Sit to Stand: Modified independent Balance: 
Sitting: Intact Standing: Intact; Without support Standing - Static: Good Standing - Dynamic : Fair(fair plus) ADL Intervention: 
Grooming Position Performed: Standing Washing Face: Minimum assistance Upper Body Dressing Assistance Hospital Gown: Maximum assistance Shirt simulation with hospital gown: Maximum assistance Cognitive Retraining Safety/Judgement: Fall prevention Pain: 
Pain level pre-treatment: 0/10 Pain level post-treatment: 0/10 Activity Tolerance:   
Fair Please refer to the flowsheet for vital signs taken during this treatment. After treatment:  
[]  Patient left in no apparent distress sitting up in chair 
[x]  Patient left in no apparent distress standing at window 
[]  Call bell left within reach [x]  Nursing notified 
[]  Caregiver present 
[]  Bed alarm activated COMMUNICATION/EDUCATION:  
[] Role of Occupational Therapy in the acute care setting [] Home safety education was provided and the patient/caregiver indicated understanding. [] Patient/family have participated as able in working towards goals and plan of care. [] Patient/family agree to work toward stated goals and plan of care. [] Patient understands intent and goals of therapy, but is neutral about his/her participation. [x] Patient is unable to participate in goal setting and plan of care 2/2 increase confusion. Thank you for this referral. 
TAMI Lira Time Calculation: 19 mins

## 2020-11-10 NOTE — PROGRESS NOTES
Problem: Mobility Impaired (Adult and Pediatric) Goal: *Acute Goals and Plan of Care (Insert Text) Description: Physical Therapy Goals Initiated 11/1/2020, reevaluated 11/10/2020 and to be accomplished within 7 day(s) 1. Patient will move from supine to sit and sit to supine  in bed with modified independence/supervision. 2.  Patient will transfer from bed to chair and chair to bed with modified independence/supervision using the least restrictive device. 3.  Patient will perform sit to stand with modified independence/supervision. 4.  Patient will ambulate with modified independence/supervision for 200 feet with the least restrictive device. 5.  Patient will ascend/descend 4 stairs with  handrail(s) with supervision. PLOF: Patient reports he was independent with self care and functional mobility. He lives alone in single story home with 4-5 MAGRUERITE and B HR. Outcome: Progressing Towards Goal 
 PHYSICAL THERAPY RE-EVALUATION and TREATMENT Patient: Lennox Brush (35 y.o. male) Date: 11/10/2020 Primary Diagnosis: Atrial fibrillation with rapid ventricular response ([address] [I48.91] Procedure(s) (LRB): ENDOSCOPY w cautery w injection w bx's (N/A) 7 Days Post-Op Precautions:   Fall, Skin ASSESSMENT : 
Patient is cleared by nursing for PT reevaluation, and patient consents to therapy. Pt only oriented to himself today. He is impulsive with mobility and has no safety awareness. Pt having difficulty following simple commands. Pt was ambulating in his room and performing transfers by himself. He is unsteady and unsafe. Added chair alarm for increased safety and informed nursing. Pt performing sit to stands with therapist standby assistance for safety and gait in room 30 feet with standby assistance for safety. Pt reaches down to  items off the floor standby assistance/contact guard assistance for safety.   
 
Pt has been seen over the past week with pt's confusion being worse recently. He is having difficulty following commands and has no carry over from sessions. Recommend following over next week for any increase in command following and carry over to benefit fully from skilled therapy. Pt ended therapy sitting in recliner with all needs met and alarm donned. Patient will benefit from skilled intervention to address the above impairments. Patient's rehabilitation potential is considered to be Guarded Factors which may influence rehabilitation potential include:   
[]         None noted 
[x]         Mental ability/status []         Medical condition 
[]         Home/family situation and support systems 
[]         Safety awareness 
[]         Pain tolerance/management 
[]         Other: PLAN : 
Recommendations and Planned Interventions:   
[x]           Bed Mobility Training             [x]    Neuromuscular Re-Education 
[x]           Transfer Training                   []    Orthotic/Prosthetic Training 
[x]           Gait Training                          [x]    Modalities [x]           Therapeutic Exercises           [x]    Edema Management/Control 
[x]           Therapeutic Activities            [x]    Family Training/Education 
[x]           Patient Education 
[]           Other (comment): Frequency/Duration: Patient will be followed by physical therapy 1-2 times per day/4-7 days per week to address goals. Discharge Recommendations: SNF vs LTC vs home health with 24/7 assist 
Further Equipment Recommendations for Discharge: N/A  
 
SUBJECTIVE:  
Patient stated John Lynn.  OBJECTIVE DATA SUMMARY:  
Hospital course since last seen and reason for re-evaluation: Pt has been seen over the past week with pt's confusion being worse recently. He is having difficulty following commands and has no carry over from sessions. Recommend following over next week for any increase in command following and carry over to benefit fully from skilled therapy. Updated PT goals. Past Medical History:  
Diagnosis Date Ill-defined condition   
 mild memory loss with hallucinations History reviewed. No pertinent surgical history. Barriers to Learning/Limitations: yes;  altered mental status (i.e.Sedation, Confusion) Compensate with: Visual Cues, Verbal Cues, and Tactile Cues Home Situation:  
Home Situation Home Environment: Private residence # Steps to Enter: 4 Rails to Enter: Yes Hand Rails : Bilateral 
One/Two Story Residence: One story Living Alone: Yes Support Systems: Family member(s), Child(josie) Patient Expects to be Discharged to[de-identified] Private residence(per pt's daughter pt is to live with family) Current DME Used/Available at Home: Shower chair, Grab bars Tub or Shower Type: Tub/Shower combination Critical Behavior: 
Neurologic State: Confused Orientation Level: Oriented to person;Disoriented to place; Disoriented to situation;Disoriented to time Cognition: Decreased command following;Decreased attention/concentration;Poor safety awareness Safety/Judgement: Fall prevention Psychosocial 
Patient Behaviors: Calm;Confused Pt Identified Daily Priority: Clinical issues (comment) [employer] Process: Establish trust 
Caring Interventions: Reassure Reassure: Caring rounds Therapeutic Modalities: Humor Skin Integrity: Abrasion(L elbow) Skin Integumentary Skin Integrity: Abrasion(L elbow) B LE Strength:   
Strength: Generally decreased, functional             
B LE Tone & Sensation:  
Tone: Normal         
Sensation: Intact B LE Range Of Motion: 
AROM: Within functional limits Posture: 
  
  
Functional Mobility: 
 
Transfers: 
Sit to Stand: Stand-by assistance Stand to Sit: Stand-by assistance Balance:  
Sitting: Intact Standing: Impaired Standing - Static: Good Standing - Dynamic : Fair Ambulation/Gait Training: 
Distance (ft): (30) Assistive Device: (none) Ambulation - Level of Assistance: Contact guard assistance;Stand-by assistance Gait Abnormalities: Decreased step clearance Base of Support: Narrowed Speed/Lilia: Pace decreased (<100 feet/min) Step Length: Right shortened;Left shortened Therapeutic Exercises:  
Reviewed and performed ankle pumps to increase blood flow and circulation. Pain: No pain noted before, during, or at end of session. Activity Tolerance:  
fair Please refer to the flowsheet for vital signs taken during this treatment. After treatment:  
[x]         Patient left in no apparent distress sitting up in chair 
[]         Patient left in no apparent distress in bed 
[x]         Call bell left within reach [x]   Personal items in reach [x]         Nursing notified Gloria Wilson 
[]         Caregiver present [x]         Bed/chair alarm activated 
[]         SCDs applied COMMUNICATION/EDUCATION:  
[x]         Role of Physical Therapy in the acute care setting. [x]         Fall prevention education was provided and the patient/caregiver indicated understanding. [x]         Patient/family have participated as able in goal setting and plan of care. [x]         Patient/family agree to work toward stated goals and plan of care. []         Patient understands intent and goals of therapy, but is neutral about his/her participation. []         Patient is unable to participate in goal setting/plan of care: ongoing with therapy staff. [x]         Out of bed at least 3-5 times a day with nursing assistance. []         Other: Thank you for this referral. 
Keira Carrillo, PT, DPT Time Calculation: 16 mins

## 2020-11-11 NOTE — ROUTINE PROCESS
Bedside shift change report given to Popeye Jimenez RN (oncoming nurse) by [address] [place], RN (offgoing nurse). Report included the following information SBAR, Kardex, MAR and Cardiac Rhythm Afib.

## 2020-11-11 NOTE — PROGRESS NOTES
Problem: Falls - Risk of 
Goal: *Absence of Falls Description: Document Gaston Abi Fall Risk and appropriate interventions in the flowsheet. Outcome: Progressing Towards Goal 
Note: Fall Risk Interventions: 
Mobility Interventions: Bed/chair exit alarm, Patient to call before getting OOB Mentation Interventions: Bed/chair exit alarm, Door open when patient unattended, Family/sitter at bedside, Toileting rounds, Room close to nurse's station, More frequent rounding Medication Interventions: Teach patient to arise slowly, Patient to call before getting OOB, Evaluate medications/consider consulting pharmacy Elimination Interventions: Bed/chair exit alarm, Call light in reach, Toileting schedule/hourly rounds History of Falls Interventions: Investigate reason for fall, Door open when patient unattended Problem: Non-Violent Restraints Goal: *Removal from restraints as soon as assessed to be safe Outcome: Progressing Towards Goal 
Goal: *No harm/injury to patient while restraints in use Outcome: Progressing Towards Goal 
Goal: *Patient's dignity will be maintained Outcome: Progressing Towards Goal 
Goal: *Patient Specific Goal (EDIT GOAL, INSERT TEXT) Outcome: Progressing Towards Goal 
Goal: Non-violent Restaints:Standard Interventions Outcome: Progressing Towards Goal 
Goal: Non-violent Restraints:Patient Interventions Outcome: Progressing Towards Goal 
Goal: Patient/Family Education Outcome: Progressing Towards Goal 
  
Problem: Nutrition Deficit Goal: *Optimize nutritional status Outcome: Progressing Towards Goal 
  
Problem: Pressure Injury - Risk of 
Goal: *Prevention of pressure injury Description: Document Rafa Scale and appropriate interventions in the flowsheet.  
Outcome: Progressing Towards Goal 
Note: Pressure Injury Interventions: 
Sensory Interventions: Assess changes in LOC, Assess need for specialty bed, Discuss PT/OT consult with provider, Maintain/enhance activity level, Minimize linen layers Moisture Interventions: Absorbent underpads, Check for incontinence Q2 hours and as needed Activity Interventions: PT/OT evaluation Mobility Interventions: Turn and reposition approx. every two hours(pillow and wedges), Pressure redistribution bed/mattress (bed type) Nutrition Interventions: Document food/fluid/supplement intake, Offer support with meals,snacks and hydration Friction and Shear Interventions: Minimize layers

## 2020-11-11 NOTE — PROGRESS NOTES
Hospitalist Progress Note Patient: Shannan Alcazar Age: 79 y.o. : 1940 MR#: [id number] SSN: [id number] Date/Time: 2020 10:26 AM 
 
DOA: 10/29/2020 PCP: None Subjective: He is found sitting in chair with sitter to help re-direct him and for safety. He is able to feed himself. He expressed that he has egg and it takes go. Per nursing, he remains confuse and has been up walking the hallway, got tire and slept. Haldol has been given. EKG with normal QTc I spoke with Enrrique Tompkins, his daughter Letitia Zendejasws [phone]) and updated on plan of care. He is currently on oral antibiotics for his H.pylori. He has finished 3 days of ciprofloxacin for suspected UTI, his urine grew mix gilda. I update that he remains confused and will require LTC or lockdown unit for his safety He remains on metoprolol for his HTN and AFIB, he cannot be on anticoagulation at this time to due recent GI bleed Otherwise, she is update on [employer] decline. She is working on medicaid application. I updated that since he is no longer requiring inpatient care, he can be discharged to home or to LTC facility ROS: limited but able to answer No current fever/chills, no headache, no dizziness, no facial pain No swallowing pain, No chest pain, no palpitation, no shortness of breath, no abd pain, No diarrhea, no urinary complaint, no leg pain or swelling Assessment/Plan: 1. 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 
     -No clear evidence of infection, CT head without acute event. TSH normal 
     -MRI brain without stroke 2. Acute systolic heart failure, elevated proBNP but no evidence of volume overload With indeterminate troponin elevation on admission 3. Paroxymal atrial fibrillation with RVR, in rate control Unable to anticoagulate due to recent GI bleed 4. Hypernatremia due to lack of fluid intake, resolved 5. Acute renal insufficiency in the setting of lasix use and not maintain oral intake. Hypernatremia, due to decrease oral hydration 6. GI bleed due to Duodenal ulcer, s/p cauterize with bicap, s/p EGD Duodenal ulcer with biopsy showing H.pylori infection 7. Acute anemia due to blood loss, stable Hgb/Hct With Iron deficiency anemia 8. Alcohol positive on admission but no evidence of alcohol consumption  
      -patient confirmed that he does not drinks alcohol. 
      -negative alcohol level on repeat 9. Fall, mechanical vs syncope 10. Right upper extremity pain due to fall, resolved 11. H/o tobacco abuse 12. Right lower leg chronic wound, no infection ? PAD He is medically stable for discharge to LTC or home with 24hrs supervision I spoke with his family for plan of care. Currently, family is working with [employer] application for placement Continue sitter for safety Continue low dose haldol BID to help with agitation and confusion Currently, he has no infection and not likely the etiology of his confusion. Neurology concerns for dementia that was acute worsened by his recent medical illness. No further recommendation from neurology. Prognosis of his neurological recovery is poor since his symptom has been since prior to June as reported by family Avoid Benzo for now Cont Amoxicillin/clarithromycin/PP for H.pylori Cont flomax Avoid lovenox. Continue Metoprolol. Cardiology has no further invasive workup. AVoid [address] for now Can consider ASA when GI bleeding risk is low. Nutritional support Cont oral medications. Agree with PT/OT assessment, he will likely need 24hrs supervision for safety concern. Can resume lasix every other day for now Limited 1.5 liter fluid. Avoid salt. IO monitoring. Cont to advance diet. Full code Disposition planning: spoke with [facility] for placement planning Spoke with Tasha Bourne and updated on clinical status and plan of care. Her other called and asked this MD to call back for second update, I called back to [phone] but no answer, twice. Additional Notes:   
Time spent >35 minutes Case discussed with:  [x]Patient  [x]Family  [x]Nursing  [x]Case Management DVT Prophylaxis:  []Lovenox  []Hep SQ  [x]SCDs  []Coumadin   []On Heparin gtt Signed By: Franklin Resendiz MD   
 2020 10:26 AM  
  
 
 
 
 
Objective:  
VS:  
Visit Vitals /81 (BP 1 Location: Right arm, BP Patient Position: Sitting) Pulse (!) 108 Temp 97.3 °F (36.3 °C) Resp 19 Ht 5' 9\" (1.753 m) Wt 71.5 kg (157 lb 10.1 oz) SpO2 95% BMI 23.28 kg/m² Tmax/24hrs: Temp (24hrs), Av.8 °F (36.6 °C), Min:97.3 °F (36.3 °C), Max:98.4 °F (36.9 °C) No intake or output data in the 24 hours ending 20 1026 Tele: afib General:  Cooperative, Not in acute distress, HEENT: PERRL, EOMI, supple neck, no JVD, dry oral mucosa Cardiovascular: S1S2 regular, no rub/gallop Pulmonary: air entry bilaterally, no wheezing, + crackle GI:  Soft, non tender, non distended, +bs, no guarding Extremities:  No pedal edema, +distal pulses appreciated Chronic right leg wound Neuro: AOx2, moving all extremities Additional:  
 
 
Current Facility-Administered Medications Medication Dose Route Frequency  haloperidoL (HALDOL) tablet 5 mg  5 mg Oral BID  influenza vaccine  (6 mos+)(PF) (FLUARIX/FLULAVAL/FLUZONE QUAD) injection 0.5 mL  0.5 mL IntraMUSCular PRIOR TO DISCHARGE  amoxicillin (AMOXIL) capsule 1,000 mg  1,000 mg Oral Q12H  clarithromycin (BIAXIN) 250 mg/5 mL oral suspension 500 mg  500 mg Oral Q12H  pantoprazole (PROTONIX) tablet 40 mg  40 mg Oral BID  thiamine HCL (B-1) tablet 100 mg  100 mg Oral DAILY  sucralfate (CARAFATE) tablet 1 g  1 g Oral AC&HS  
  metoprolol tartrate (LOPRESSOR) tablet 50 mg  50 mg Oral Q12H  
 metoprolol (LOPRESSOR) injection 5 mg  5 mg IntraVENous Q4H PRN  
 sodium chloride (NS) flush 5-40 mL  5-40 mL IntraVENous Q8H  
 sodium chloride (NS) flush 5-40 mL  5-40 mL IntraVENous PRN  
 furosemide (LASIX) tablet 20 mg  20 mg Oral DAILY  hydrALAZINE (APRESOLINE) 20 mg/mL injection 10 mg  10 mg IntraVENous Q6H PRN  
 [Held by provider] aspirin chewable tablet 81 mg  81 mg Oral DAILY  therapeutic multivitamin (THERAGRAN) tablet 1 Tab  1 Tab Oral DAILY  folic acid (FOLVITE) tablet 1 mg  1 mg Oral DAILY  acetaminophen (TYLENOL) tablet 650 mg  650 mg Oral Q6H PRN Or  
 acetaminophen (TYLENOL) suppository 650 mg  650 mg Rectal Q6H PRN  polyethylene glycol (MIRALAX) packet 17 g  17 g Oral DAILY PRN  promethazine (PHENERGAN) tablet 12.5 mg  12.5 mg Oral Q6H PRN Or  
 ondansetron (ZOFRAN) injection 4 mg  4 mg IntraVENous Q6H PRN Lab/Data Review: 
Labs: Results:  
   
Chemistry Recent Labs 11/11/20 
4680 GLU 95   
K 3.8  CO2 27 BUN 32* CREA 1.11  
BUCR 29* AGAP 6  
CA 8.2* No results for input(s): TBIL, ALT, ALKP, TP, ALB, GLOB, AGRAT in the last 72 hours. No lab exists for component: SGOT  
CBC w/Diff Recent Labs 11/11/20 
5706 WBC 8.5  
RBC 2.81* HGB 8.9* HCT 28.8*  
.5*  
MCH 31.7 MCHC 30.9*  
RDW 15.3*  
 Coagulation No results for input(s): PTP, INR, APTT, INREXT, INREXT in the last 72 hours. Iron/Ferritin Lab Results Component Value Date/Time Iron 48 (L) 11/03/2020 12:55 AM  
 TIBC 232 (L) 11/03/2020 12:55 AM  
 Iron % saturation 21 11/03/2020 12:55 AM  
 Ferritin 63 11/03/2020 12:55 AM  
   
BNP Cardiac Enzymes Lab Results Component Value Date/Time CK 83 10/30/2020 08:14 AM  
 CK - MB 1.2 10/30/2020 08:14 AM  
 CK-MB Index 1.4 10/30/2020 08:14 AM  
 Troponin-I, QT 0.04 10/30/2020 08:14 AM  
 
  
Lactic Acid Thyroid Studies All Micro Results Procedure Component Value Units Date/Time CULTURE, URINE [[id number]] Collected:  11/05/20 1900 Order Status:  Completed Specimen:  Cath Urine Updated:  11/07/20 1039 Special Requests: NO SPECIAL REQUESTS [place] Count --     
  [id number] COLONIES/mL Culture result:    
  MIXED UROGENITAL ENMANUEL ISOLATED Images: 
 
CT (Most Recent). CT Results (most recent): 
Results from Hospital Encounter encounter on 10/29/20 CT HEAD WO CONT Narrative CT OF THE BRAIN 
 
COMPARISON: None. INDICATIONS: Fall and memory loss. TECHNIQUE: Volumetric data acquisition was performed   through the brain on a 
multislice scanner and reconstructed in the axial plane. FINDINGS:   
 
The ventricles and CSF spaces are enlarged consistent with age associated 
atrophy. There is no midline shift. Mateo Shirley The brain parenchyma is of generally normal attenuation. There is decreased 
attenuation in the periventricular white matter consistent with presumed 
microvascular disease. There is no hemorrhage evident. There are no pathologic fluid collections. There are no pathologic calcifications. . 
. The visible portions of the paranasal sinuses: Are clear. Impression IMPRESSION:  
 
Atrophy and microvascular disease. No acute intracranial process. All CT scans at this facility are performed using dose optimization technique as 
appropriate to the performed exam, to include automated exposure control, 
adjustment of the mA and/or kV according to patient's size (Including 
appropriate matching for site-specific examinations), or use of iterative 
reconstruction technique. MRI Results (most recent): 
Results from Hospital Encounter encounter on 10/29/20 MRI BRAIN WO CONT Narrative Brain MR without contrast 
 
HISTORY: Confusion, memory loss, hallucinations and falls. COMPARISON: CT 10/29/2020 TECHNIQUE: Brain scanned with sagittal and axial T1W scans, axial T2W , axial 
FLAIR, axial diffusion weighted images and SWAN. FINDINGS: Details are limited by mild-to-moderate motion artifact. Cerebral parenchyma: No restricted diffusion abnormalities to suggest acute 
stroke. No evidence of edema, mass effect or mass lesion. No significant T2 or FLAIR hyperintense abnormalities. Prominent symmetric susceptibility hypointense 
signal in the basal ganglia. Brain volumes and ventricular system: Normal in size and morphology for the 
patient's age. Midline structures: Normal. 
 
Cerebellum: Normal. 
 
Brainstem: Normal. 
 
Vascular system: Expected arterial flow voids are present at the base of brain. Calvarium and skull base: Normal. 
 
Paranasal sinuses and mastoid air cells: Essentially clear. Couple of tiny mucus 
retention cysts in the maxillary sinuses. Visualized orbits: Unremarkable for a nondedicated exam. 
 
Visualized upper cervical spine: Unremarkable for a nondedicated exam. 
  
 Impression IMPRESSION: 
 
1. No acute brain abnormalities. 2.  Symmetric prominent susceptibility artifact in the basal ganglia suggesting 
increased iron/mineral deposition. XRAY (Most Recent) EKG No results found for this or any previous visit. 2D ECHO 10/29/20 ECHO ADULT COMPLETE 10/31/2020 10/31/2020 Narrative · LV: Estimated LVEF is 40 - 45%. Visually measured ejection fraction. Normal cavity size. Mild concentric hypertrophy. · RV: Mildly dilated right ventricle. Mildly reduced systolic function. · PA: Mild pulmonary hypertension. Pulmonary arterial systolic pressure is 46 mmHg. · IVC: Moderately elevated central venous pressure (10-15 mmHg); IVC  
diameter is larger than 21 mm and collapses more than 50% with  
respiration.  
   
  Signed by: Elissa Randall MD

## 2020-11-11 NOTE — PROGRESS NOTES
Followed up with Daughter Carlotta Omalley who stated that her sister called the [facility] [place] [facility] appeal number and left a message. Fercho Gray RN BSN Care Manager [phone]

## 2020-11-11 NOTE — ROUTINE PROCESS
Bedside shift change report given to [address] (oncoming nurse) by Claudia Espinal (offgoing nurse). Report included the following information SBAR, Procedure Summary, MAR and Recent Results.

## 2020-11-11 NOTE — PROGRESS NOTES
Found patient urinating on the wall in front of the board. Patient claims he was in the bathroom using the toilet. After much redirection, was able to get patient back to bed. Patient remains confused and agitated. Contacted the on call Hospitalist. Hospitalist ordered 2 mg of Haldol once.

## 2020-11-11 NOTE — PROGRESS NOTES
Called Brigitte Lopez to inform of discharge. Mary Lopez stated she is unable to bring him home, stating that Dr Dutch Topete wants patient to go to a locked unit LTC. Patient has been denied SNF after a Peer to Peer with [employer]. Patient and family unable to pay for LTC. [employer] has been in contact with daughter to start the Medicaid application, but unable to provide all documents. Daughter or any family member does not have POA. Mary Lopez stated that her sisters are looking for a locked unit. Spoke with daughter about the appeal process and gave brigitte Lopez the [facility] number [phone] and instructed her to call. Important Message from [employer]" reviewed and explained with the patient and/or representative Augusta Yosvany, verbally over the phone and verbal signature was obtained. A signed copy provided to patient/representative. Original signed document placed in patient's chart. Detailed notice of discharge verbally signed with brigitte Lopez and given to [address] specialist.   
 
Moncho Shipley RN BSN Care Manager [phone]

## 2020-11-11 NOTE — PROGRESS NOTES
PT treatment attempted at [phone], pt awake in bed but not following commands, incoherent mumbling. Independent rolling in bed but did not attempt to transition to sitting. RN reporting he was walking around unit earlier today. Will follow up as schedule allows.   
 
Cleavon Camp PT DPT

## 2020-11-12 NOTE — PROGRESS NOTES
1925: Assumed patient care from [facility]. Patient is alert and oriented to person, but disoriented to place, time and situation. Respiratory status is stable on room air. Vital signs are stable. MEWS score is a one. Patient denies any pain, discomfort, nausea vomiting dizziness or anxiety. White board and fall card is updated. Bed is locked and in lowest position. Call bell, water and personal belongings are within reach. Patient has no questions, comments or concerns after bedside shift report. 0700: Patient had an uneventful shift. Respiratory status, vital signs and MEWS score remained stable. Patient was resting quietly with no signs of distress noted. Bed locked and in lowest position. Call bell water and personal belongings were within reach. Patient had no questions, comments or concerns after bedside shift report.  Bedside report given to [facility].

## 2020-11-12 NOTE — PROGRESS NOTES
No acceptances thus far for LTC with medicaid pending, especially since patient walks well with concerns for wondering. Called [facility] to inquire about bed availavility for Jenn. Rabia [facility] stated that [facility] which is not open to admissions yet would be a good option since they have a memory care unit. Rabia Figures stated she would call this writer back with more info of when the facility would be opening. Called family to inform of info, left message on voicemail. Marylu Schulz RN BSN Care Manager [phone]

## 2020-11-12 NOTE — PROGRESS NOTES
Problem: Mobility Impaired (Adult and Pediatric) Goal: *Acute Goals and Plan of Care (Insert Text) Description: Physical Therapy Goals Initiated 11/1/2020, reevaluated 11/10/2020 and to be accomplished within 7 day(s) 1. Patient will move from supine to sit and sit to supine  in bed with modified independence/supervision. 2.  Patient will transfer from bed to chair and chair to bed with modified independence/supervision using the least restrictive device. 3.  Patient will perform sit to stand with modified independence/supervision. 4.  Patient will ambulate with modified independence/supervision for 200 feet with the least restrictive device. 5.  Patient will ascend/descend 4 stairs with  handrail(s) with supervision. PLOF: Patient reports he was independent with self care and functional mobility. He lives alone in single story home with 4-5 MARGUERITE and B HR. Outcome: Progressing Towards Goal 
 
PHYSICAL THERAPY TREATMENT Patient: Cristhian Dillon (14 y.o. male) Date: 11/12/2020 Diagnosis: Atrial fibrillation with rapid ventricular response ([address] [I48.91] Atrial fibrillation with rapid ventricular response ([address] Procedure(s) (LRB): ENDOSCOPY w cautery w injection w bx's (N/A) 9 Days Post-Op Precautions: Fall, Skin ASSESSMENT: 
Nurse cleared patient for PT. Patient received sitting up in recliner, beginning to attempt to get up from the chair. Patient mumbles and speech in comprehensible. He performs sit to stand with minimal assistance. He ambulates inside room with arm hold assistance and presents with unsteady gait. Appreciate RN [address] [place] [address] for assistance, gave pt RW to increase stability. Patient with decreased command following, decreased attention to task, and hallucinating. Pt unsafe to continue ambulating in hallway and assisted into transport chair. Wheeled patient back to his room and assisted back to bed. Patient left with RN presents at bedside and bed alarm activated. Progression toward goals:  
[]      Improving appropriately and progressing toward goals [x]      Improving slowly and progressing toward goals 
[]      Not making progress toward goals and plan of care will be adjusted PLAN: 
Patient continues to benefit from skilled intervention to address the above impairments. Continue treatment per established plan of care. Discharge Recommendations:  Home Health with 24/7 assist/supervision vs SNF/LTC Further Equipment Recommendations for Discharge:  rolling walker SUBJECTIVE:  
Patient stated okay.  OBJECTIVE DATA SUMMARY:  
Critical Behavior: 
Neurologic State: Alert, Confused Orientation Level: Disoriented to place, Disoriented to situation, Disoriented to time Cognition: Decreased command following, Decreased attention/concentration Safety/Judgement: Fall prevention, Decreased awareness of environment, Decreased awareness of need for safety Functional Mobility Training: 
 
Transfers: 
Sit to Stand: Minimum assistance Stand to Sit: Contact guard assistance;Assist x2 Ambulation/Gait Training: 
Distance (ft): 40 Feet (ft) Assistive Device: Walker, rolling Ambulation - Level of Assistance: Contact guard assistance;Minimal assistance x1-2 Gait Abnormalities: Decreased step clearance; Path deviations Base of Support: Center of gravity altered Speed/Lilia: Slow Pain: 
Pain level pre-treatment: -/10 Pain level post-treatment: -/10 Pain Intervention(s): Medication (see MAR); Rest, Ice, Repositioning Response to intervention: Nurse notified, See doc flow Activity Tolerance:  
Fair Please refer to the flowsheet for vital signs taken during this treatment. After treatment:  
[] Patient left in no apparent distress sitting up in chair 
[x] Patient left in no apparent distress in bed 
[x] Call bell left within reach [x] Nursing notified 
[] Caregiver present [x] Bed alarm activated 
[] SCDs applied COMMUNICATION/EDUCATION:  
 [x]         Role of Physical Therapy in the acute care setting. [x]         Fall prevention education was provided and the patient/caregiver indicated understanding. []         Patient/family have participated as able in working toward goals and plan of care. []         Patient/family agree to work toward stated goals and plan of care. []         Patient understands intent and goals of therapy, but is neutral about his/her participation. []         Patient is unable to participate in stated goals/plan of care: ongoing with therapy staff. 
[]         Other: 
 
   
Taisha Putnam PT Time Calculation: 17 mins

## 2020-11-12 NOTE — PROGRESS NOTES
[facility] Appeal Letter, Medicare Important Message Letter and Detailed Notice of Discharge Letter into patient's chart. Lilia Body in HIM to [facility] the above information [facility] the [facility]

## 2020-11-12 NOTE — PROGRESS NOTES
Hospitalist Progress Note Patient: Gagandeep Brantley Age: 79 y.o. : 1940 MR#: [id number] SSN: [id number] Date/Time: 2020 10:26 AM 
 
DOA: 10/29/2020 PCP: None Subjective:  
 
Patient seen and evaluated, sitting in chair, no acute distress, trying to get out of chair and move around, he is at high risk of fall and patient has been advised to sit back in his chair. He is able to feed himself. I spoke with Pastor Hewitt, his daughter Devika Mckeon [phone]) and updated on plan of care. She mentioned that his Medicaid application had been completed and has been filed. She is waiting to hear from them sometime soon. She also mentioned that she will be here later during the day to see him. He is currently on oral antibiotics for his H.pylori. He has finished 3 days of ciprofloxacin for suspected UTI, his urine grew mix gilda. I update that he remains confused and will require LTC or lockdown unit for his safety He remains on metoprolol for his HTN and AFIB, he cannot be on anticoagulation at this time to due recent GI bleed Otherwise, she is update on [employer] decline. She is working on medicaid application. I updated that since he is no longer requiring inpatient care, he can be discharged to home or to LTC facility Chart and  note reviewed. ROS: limited but able to answer No current fever/chills, no headache, no dizziness, no facial pain No swallowing pain, No chest pain, no palpitation, no shortness of breath, no abd pain, No diarrhea, no urinary complaint, no leg pain or swelling Assessment/Plan: 1. 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 
     -No clear evidence of infection, CT head without acute event. TSH normal 
     -MRI brain without stroke 2.   Acute systolic heart failure, elevated proBNP but no evidence of volume overload With indeterminate troponin elevation on admission 3. Paroxymal atrial fibrillation with RVR, in rate control Unable to anticoagulate due to recent GI bleed 4. Hypernatremia due to lack of fluid intake, resolved 5. Acute renal insufficiency in the setting of lasix use and not maintain oral intake. Hypernatremia, due to decrease oral hydration 6. GI bleed due to Duodenal ulcer, s/p cauterize with bicap, s/p EGD Duodenal ulcer with biopsy showing H.pylori infection 7. Acute anemia due to blood loss, stable Hgb/Hct With Iron deficiency anemia 8. Alcohol positive on admission but no evidence of alcohol consumption  
      -patient confirmed that he does not drinks alcohol. 
      -negative alcohol level on repeat 9. Fall, mechanical vs syncope 10. Right upper extremity pain due to fall, resolved 11. H/o tobacco abuse 12. Right lower leg chronic wound, no infection ? PAD He is medically stable for discharge to LTC or home with 24hrs supervision I spoke with his family for plan of care. Currently, family is working with medicaid application for placementdaughter mentions that application has been submitted and she is waiting to hear back from them. Continue sitter for safety Continue low dose haldol BID to help with agitation and confusion Currently, he has no infection and not likely the etiology of his confusion. Neurology concerns for dementia that was acute worsened by his recent medical illness. No further recommendation from neurology. Prognosis of his neurological recovery is poor since his symptom has been since prior to June as reported by family Avoid Benzo for now Cont Amoxicillin/clarithromycin/PP for H.pylori Cont flomax Avoid lovenox. Continue Metoprolol. Cardiology has no further invasive workup. AVoid [facility] for now Can consider ASA when GI bleeding risk is low. Nutritional support Cont oral medications. Agree with PT/OT assessment, he will likely need 24hrs supervision for safety concern. Can resume lasix every other day for now Limited 1.5 liter fluid. Avoid salt. IO monitoring. Cont to advance diet. Full code Disposition planning: spoke with ICM for placement planning Spoke with Lillie Jose and updated on clinical status and plan of care. One of his daughters was in the room later to see him. Additional Notes:   
Time spent >35 minutes Case discussed with:  [x]Patient  [x]Family  [x]Nursing  [x]Case Management DVT Prophylaxis:  []Lovenox  []Hep SQ  [x]SCDs  []Coumadin   []On Heparin gtt Signed By: Batsheva High MD   
 2020 Objective:  
VS:  
Visit Vitals BP (!) 167/99 Pulse 60 Temp 97.1 °F (36.2 °C) Resp 18 Ht 5' 9\" (1.753 m) Wt 71.2 kg (157 lb) SpO2 98% BMI 23.18 kg/m² Tmax/24hrs: Temp (24hrs), Av.6 °F (36.4 °C), Min:97.1 °F (36.2 °C), Max:98.2 °F (36.8 °C) Intake/Output Summary (Last 24 hours) at 2020 1601 Last data filed at 2020 8367 Gross per 24 hour Intake 360 ml Output 0 ml Net 360 ml Tele: afib General:  Cooperative, Not in acute distress, HEENT: PERRL, EOMI, supple neck, no JVD, dry oral mucosa Cardiovascular: S1S2 regular, no rub/gallop Pulmonary: air entry bilaterally, no wheezing, + crackle GI:  Soft, non tender, non distended, +bs, no guarding Extremities:  No pedal edema, +distal pulses appreciated Chronic right leg wound Neuro: AOx2, moving all extremities Additional:  
 
 
Current Facility-Administered Medications Medication Dose Route Frequency  melatonin tablet 6 mg  6 mg Oral QHS  haloperidoL (HALDOL) tablet 5 mg  5 mg Oral BID  influenza vaccine  (6 mos+)(PF) (FLUARIX/FLULAVAL/FLUZONE QUAD) injection 0.5 mL  0.5 mL IntraMUSCular PRIOR TO DISCHARGE  
  amoxicillin (AMOXIL) capsule 1,000 mg  1,000 mg Oral Q12H  clarithromycin (BIAXIN) 250 mg/5 mL oral suspension 500 mg  500 mg Oral Q12H  pantoprazole (PROTONIX) tablet 40 mg  40 mg Oral BID  thiamine HCL (B-1) tablet 100 mg  100 mg Oral DAILY  sucralfate (CARAFATE) tablet 1 g  1 g Oral AC&HS  
 metoprolol tartrate (LOPRESSOR) tablet 50 mg  50 mg Oral Q12H  
 metoprolol (LOPRESSOR) injection 5 mg  5 mg IntraVENous Q4H PRN  
 sodium chloride (NS) flush 5-40 mL  5-40 mL IntraVENous Q8H  
 sodium chloride (NS) flush 5-40 mL  5-40 mL IntraVENous PRN  
 furosemide (LASIX) tablet 20 mg  20 mg Oral DAILY  hydrALAZINE (APRESOLINE) 20 mg/mL injection 10 mg  10 mg IntraVENous Q6H PRN  
 [Held by provider] aspirin chewable tablet 81 mg  81 mg Oral DAILY  therapeutic multivitamin (THERAGRAN) tablet 1 Tab  1 Tab Oral DAILY  folic acid (FOLVITE) tablet 1 mg  1 mg Oral DAILY  acetaminophen (TYLENOL) tablet 650 mg  650 mg Oral Q6H PRN Or  
 acetaminophen (TYLENOL) suppository 650 mg  650 mg Rectal Q6H PRN  polyethylene glycol (MIRALAX) packet 17 g  17 g Oral DAILY PRN  promethazine (PHENERGAN) tablet 12.5 mg  12.5 mg Oral Q6H PRN Or  
 ondansetron (ZOFRAN) injection 4 mg  4 mg IntraVENous Q6H PRN Lab/Data Review: 
Labs: Results:  
   
Chemistry Recent Labs 11/11/20 
9555 GLU 95   
K 3.8  CO2 27 BUN 32* CREA 1.11  
BUCR 29* AGAP 6  
CA 8.2* No results for input(s): TBIL, ALT, ALKP, TP, ALB, GLOB, AGRAT in the last 72 hours. No lab exists for component: SGOT  
CBC w/Diff Recent Labs 11/11/20 
8887 WBC 8.5  
RBC 2.81* HGB 8.9* HCT 28.8*  
.5*  
MCH 31.7 MCHC 30.9*  
RDW 15.3*  
 Coagulation No results for input(s): PTP, INR, APTT, INREXT, INREXT in the last 72 hours. Iron/Ferritin Lab Results Component Value Date/Time  Iron 48 (L) 11/03/2020 12:55 AM  
 TIBC 232 (L) 11/03/2020 12:55 AM  
 Iron % saturation 21 11/03/2020 12:55 AM  
 Ferritin 63 11/03/2020 12:55 AM  
   
BNP Cardiac Enzymes Lab Results Component Value Date/Time CK 83 10/30/2020 08:14 AM  
 CK - MB 1.2 10/30/2020 08:14 AM  
 CK-MB Index 1.4 10/30/2020 08:14 AM  
 Troponin-I, QT 0.04 10/30/2020 08:14 AM  
 
  
Lactic Acid Thyroid Studies All Micro Results Procedure Component Value Units Date/Time CULTURE, URINE [[id number]] Collected:  11/05/20 1900 Order Status:  Completed Specimen:  Cath Urine Updated:  11/07/20 1039 Special Requests: NO SPECIAL REQUESTS [place] Count --     
  [id number] COLONIES/mL Culture result:    
  MIXED UROGENITAL ENMANUEL ISOLATED Images: 
 
CT (Most Recent). CT Results (most recent): 
Results from Hospital Encounter encounter on 10/29/20 CT HEAD WO CONT Narrative CT OF THE BRAIN 
 
COMPARISON: None. INDICATIONS: Fall and memory loss. TECHNIQUE: Volumetric data acquisition was performed   through the brain on a 
multislice scanner and reconstructed in the axial plane. FINDINGS:   
 
The ventricles and CSF spaces are enlarged consistent with age associated 
atrophy. There is no midline shift. Mcnamara Quince The brain parenchyma is of generally normal attenuation. There is decreased 
attenuation in the periventricular white matter consistent with presumed 
microvascular disease. There is no hemorrhage evident. There are no pathologic fluid collections. There are no pathologic calcifications. . 
. The visible portions of the paranasal sinuses: Are clear. Impression IMPRESSION:  
 
Atrophy and microvascular disease. No acute intracranial process. All CT scans at this facility are performed using dose optimization technique as 
appropriate to the performed exam, to include automated exposure control, 
adjustment of the mA and/or kV according to patient's size (Including appropriate matching for site-specific examinations), or use of iterative 
reconstruction technique. MRI Results (most recent): 
Results from Hospital Encounter encounter on 10/29/20 MRI BRAIN WO CONT Narrative Brain MR without contrast 
 
HISTORY: Confusion, memory loss, hallucinations and falls. COMPARISON: CT 10/29/2020 TECHNIQUE: Brain scanned with sagittal and axial T1W scans, axial T2W , axial 
FLAIR, axial diffusion weighted images and SWAN. FINDINGS: Details are limited by mild-to-moderate motion artifact. Cerebral parenchyma: No restricted diffusion abnormalities to suggest acute 
stroke. No evidence of edema, mass effect or mass lesion. No significant T2 or FLAIR hyperintense abnormalities. Prominent symmetric susceptibility hypointense 
signal in the basal ganglia. Brain volumes and ventricular system: Normal in size and morphology for the 
patient's age. Midline structures: Normal. 
 
Cerebellum: Normal. 
 
Brainstem: Normal. 
 
Vascular system: Expected arterial flow voids are present at the base of brain. Calvarium and skull base: Normal. 
 
Paranasal sinuses and mastoid air cells: Essentially clear. Couple of tiny mucus 
retention cysts in the maxillary sinuses. Visualized orbits: Unremarkable for a nondedicated exam. 
 
Visualized upper cervical spine: Unremarkable for a nondedicated exam. 
  
 Impression IMPRESSION: 
 
1. No acute brain abnormalities. 2.  Symmetric prominent susceptibility artifact in the basal ganglia suggesting 
increased iron/mineral deposition. XRAY (Most Recent) EKG No results found for this or any previous visit. 2D ECHO 10/29/20 ECHO ADULT COMPLETE 10/31/2020 10/31/2020 Narrative · LV: Estimated LVEF is 40 - 45%. Visually measured ejection fraction. Normal cavity size. Mild concentric hypertrophy. · RV: Mildly dilated right ventricle. Mildly reduced systolic function. · PA: Mild pulmonary hypertension. Pulmonary arterial systolic pressure is 46 mmHg. · IVC: Moderately elevated central venous pressure (10-15 mmHg); IVC  
diameter is larger than 21 mm and collapses more than 50% with  
respiration.  
   
  Signed by: Elissa Randall MD

## 2020-11-12 NOTE — PALLIATIVE CARE
Hany Watt  Daughter  [phone] Called Daughter to check on appeal.  Katie Ocampo stated the number [phone] was called at lunch time and 5pm on 11/11/2020. Katie Ocampo stated she would get her sister to call again today since Katie Damaris is at work. Jaylen Flynn RN BSN Care Manager [phone]

## 2020-11-12 NOTE — ROUTINE PROCESS
Bedside shift change report given to Trudy Bonds RN (oncoming nurse) by Ryan Matta RN (offgoing nurse). Report included the following information SBAR, Kardex, MAR and Cardiac Rhythm Afib.

## 2020-11-13 NOTE — PROGRESS NOTES
Problem: Falls - Risk of 
Goal: *Absence of Falls Description: Document Vicente Mckeon Fall Risk and appropriate interventions in the flowsheet. Outcome: Progressing Towards Goal 
Note: Fall Risk Interventions: 
Mobility Interventions: Bed/chair exit alarm Mentation Interventions: Reorient patient, Door open when patient unattended, Bed/chair exit alarm, Toileting rounds Medication Interventions: Teach patient to arise slowly, Patient to call before getting OOB, Evaluate medications/consider consulting pharmacy Elimination Interventions: Toileting schedule/hourly rounds, Toilet paper/wipes in reach, Patient to call for help with toileting needs History of Falls Interventions: Bed/chair exit alarm, Door open when patient unattended, Room close to nurse's station Problem: Nutrition Deficit Goal: *Optimize nutritional status Outcome: Progressing Towards Goal 
  
Problem: Pressure Injury - Risk of 
Goal: *Prevention of pressure injury Description: Document Rafa Scale and appropriate interventions in the flowsheet. Outcome: Progressing Towards Goal 
Note: Pressure Injury Interventions: 
Sensory Interventions: Assess changes in LOC, Minimize linen layers Moisture Interventions: Absorbent underpads, Minimize layers Activity Interventions: Pressure redistribution bed/mattress(bed type) Mobility Interventions: Pressure redistribution bed/mattress (bed type) Nutrition Interventions: Document food/fluid/supplement intake, Offer support with meals,snacks and hydration Friction and Shear Interventions: Apply protective barrier, creams and emollients, HOB 30 degrees or less

## 2020-11-13 NOTE — PROGRESS NOTES
Problem: Self Care Deficits Care Plan (Adult) Goal: *Acute Goals and Plan of Care (Insert Text) Description: Occupational Therapy Goals Initiated 11/1/2020 within 7 day(s). Re-evaluated 11/7/20. Goal 1 Met, continue with all remaining goals. 1.  Patient will perform grooming standing at the sink with supervision/set-up for 5 minutes. (Goal met 11/7/20) 2. Patient will perform upper body dressing with supervision/set-up utilizing compensatory strategy. 3.  Patient will perform lower body dressing with supervision/set-up utilizing energy conservation techniques and/or AE as needed. 4.  Patient will perform toilet transfers with Supervision. 5.  Patient will perform all aspects of toileting with supervision/set-up. 6.  Patient will utilize energy conservation techniques during functional activities with verbal cues. Prior Level of Function: Pt reports living alone in private residence with family nearby. Pt reports being previously independent with I/ADLs, including driving. Pt has no DME available at home. Per conversation with pt's daughter pt has shower chair that he uses daily for bathing. Outcome: Progressing Towards Goal 
 OCCUPATIONAL THERAPY TREATMENT Patient: Ninfa Anderson (17 y.o. male) Date: 11/13/2020 Diagnosis: Atrial fibrillation with rapid ventricular response ([address] [I48.91] Atrial fibrillation with rapid ventricular response ([address] Procedure(s) (LRB): ENDOSCOPY w cautery w injection w bx's (N/A) 10 Days Post-Op Precautions: Fall, Skin Chart, occupational therapy assessment, plan of care, and goals were reviewed. ASSESSMENT: 
Pt co-treated w/PT to maximize safety w/OOB activity. Pt oriented to self only and requires 2 person assist w/functional mobility to bathroom. Pt w/minimal command following for ADL grooming tasks, standing sinkside. Pt requires hand over hand assist to initiate facial hygiene.  Pt tolerates standing ~ 7 minutes w/Min Assist. Pt medicated w/Haldol at 9:30 am, now w/narrow ERNESTINE and festinating gait w/functional  mobility w/return to EOB. Pt requires 2 person assist w/return to supine. Progression toward goals: 
[]          Improving appropriately and progressing toward goals [x]          Improving slowly and progressing toward goals 
[]          Not making progress toward goals and plan of care will be adjusted PLAN: 
Patient continues to benefit from skilled intervention to address the above impairments. Continue treatment per established plan of care. Discharge Recommendations:  Skilled Nursing Facility/LTC Further Equipment Recommendations for Discharge:  TBD by next level of care SUBJECTIVE:  
Patient stated . OBJECTIVE DATA SUMMARY:  
Cognitive/Behavioral Status: 
Neurologic State: Confused Orientation Level: Oriented to person Cognition: Decreased attention/concentration, Decreased command following Safety/Judgement: Fall prevention, Decreased awareness of environment, Decreased awareness of need for safety Functional Mobility and Transfers for ADLs: 
 Bed Mobility: 
Sit to Supine: Assist x2;Maximum assistance Transfers: 
Sit to Stand: Moderate assistance Chair to bed: Moderate assistance;Assist x2 Bathroom Mobility: Maximum assistance(Mod A x 2) Balance: 
Sitting: Impaired Standing: Impaired; With support ADL Intervention: 
Grooming Position Performed: Standing Washing Face: Moderate assistance Pain: 
Pain level pre-treatment: 0/10 Pain level post-treatment: 0/10 Activity Tolerance:   
Poor Please refer to the flowsheet for vital signs taken during this treatment. After treatment:  
[]  Patient left in no apparent distress sitting up in chair 
[x]  Patient left in no apparent distress in bed 
[x]  Call bell left within reach 
[]  Nursing notified 
[]  Caregiver present 
[]  Bed alarm activated COMMUNICATION/EDUCATION:  
 [] Role of Occupational Therapy in the acute care setting 
[] Home safety education was provided and the patient/caregiver indicated understanding. [] Patient/family have participated as able in working towards goals and plan of care. [] Patient/family agree to work toward stated goals and plan of care. [] Patient understands intent and goals of therapy, but is neutral about his/her participation. [x] Patient is unable to participate in goal setting and plan of care 2/2 cognitive deficits Thank you for this referral. 
TAMI Yoon Time Calculation: 23 mins

## 2020-11-13 NOTE — PROGRESS NOTES
Problem: Mobility Impaired (Adult and Pediatric) Goal: *Acute Goals and Plan of Care (Insert Text) Description: Physical Therapy Goals Initiated 11/1/2020, reevaluated 11/10/2020 and to be accomplished within 7 day(s) 1. Patient will move from supine to sit and sit to supine  in bed with modified independence/supervision. 2.  Patient will transfer from bed to chair and chair to bed with modified independence/supervision using the least restrictive device. 3.  Patient will perform sit to stand with modified independence/supervision. 4.  Patient will ambulate with modified independence/supervision for 200 feet with the least restrictive device. 5.  Patient will ascend/descend 4 stairs with  handrail(s) with supervision. PLOF: Patient reports he was independent with self care and functional mobility. He lives alone in single story home with 4-5 MARGUERITE and B HR. Outcome: Progressing Towards Goal 
  
 
PHYSICAL THERAPY TREATMENT Patient: Sonu Hill (79 y.o. male) Date: 11/13/2020 Diagnosis: Atrial fibrillation with rapid ventricular response ([address] [I48.91] Atrial fibrillation with rapid ventricular response ([address] Procedure(s) (LRB): ENDOSCOPY w cautery w injection w bx's (N/A) 10 Days Post-Op Precautions: Fall, Skin PLOF: see above ASSESSMENT: 
Pt cleared to participate by RN. Pt received sitting in bedside chair, lethargic and drowsy, decreased eye opening but able to incoherently respond to questions from PT. Completing with OT to maximize safety and mobility. Sit to stand from bedside recliner with modA. Ambulating with min-modA x7 feet to bathroom. Standing >7 minutes, attempting to perform hygiene activities with OT, min-modA for standing for safety. Ambulating back to bed with modA, shuffling gait pattern with 2-3 inch step length bilaterally. Sitting on EOB, BP of 105/67. Pt requiring mod-maxAx2 for sit to supine, unable to follow commands to lie down.  Pt left with all needs met and call bell in reach with bed alarm activated. Progression toward goals:  
[]      Improving appropriately and progressing toward goals [x]      Improving slowly and progressing toward goals 
[]      Not making progress toward goals and plan of care will be adjusted PLAN: 
Patient continues to benefit from skilled intervention to address the above impairments. Continue treatment per established plan of care. Discharge Recommendations:  [facility] Further Equipment Recommendations for Discharge:  would benefit from walker for stability SUBJECTIVE:  
Patient stated -incoherent speech throughout session.  OBJECTIVE DATA SUMMARY:  
Critical Behavior: 
Neurologic State: Confused, Lethargic Orientation Level: Oriented to person Cognition: Decreased command following, Decreased attention/concentration, Impaired decision making Safety/Judgement: Fall prevention, Decreased awareness of environment, Lack of insight into deficits Functional Mobility Training: 
Bed Mobility: 
Sit to Supine: Moderate assistance;Maximum assistance;Assist x2 Transfers: 
Sit to Stand: Moderate assistance Stand to Sit: Minimum assistance Bed to Chair: Moderate assistance;Assist x2 Balance: 
Sitting: Impaired Sitting - Static: Good (unsupported); Fair (occasional) Sitting - Dynamic: Fair (occasional) Standing: Impaired; With support Standing - Static: Fair Standing - Dynamic : Fair Posture: 
 Posture (WDL): Exceptions to [facility] Posture Assessment: Forward head;Trunk flexion Ambulation/Gait Training: 
Distance (ft): 7 Feet (ft)(x2) 
Assistive Device: (handhold ) Ambulation - Level of Assistance: Minimal assistance Gait Abnormalities: Decreased step clearance;Shuffling gait Base of Support: Center of gravity altered;Narrowed Speed/Lilia: Slow;Shuffled Step Length: Right shortened;Left shortened Pain: 
Pain level pre-treatment: 0/10 Pain level post-treatment: 0/10 No reports of pain Activity Tolerance:  
Poor activity tolerance, increased lethargy today Please refer to the flowsheet for vital signs taken during this treatment. After treatment:  
[] Patient left in no apparent distress sitting up in chair 
[x] Patient left in no apparent distress in bed 
[x] Call bell left within reach [x] Nursing notified 
[] Caregiver present [x] Bed alarm activated 
[] SCDs applied COMMUNICATION/EDUCATION:  
[x]         Role of Physical Therapy in the acute care setting. [x]         Fall prevention education was provided and the patient/caregiver indicated understanding. [x]         Patient/family have participated as able in working toward goals and plan of care. [x]         Patient/family agree to work toward stated goals and plan of care. []         Patient understands intent and goals of therapy, but is neutral about his/her participation. []         Patient is unable to participate in stated goals/plan of care: ongoing with therapy staff. 
[]         Other: 
 
   
Ed Corbett, PT Time Calculation: 23 mins

## 2020-11-13 NOTE — PROGRESS NOTES
Nutrition Assessment Type and Reason for Visit: Reassess, Consult(Oral Nutrition Supplements) Nutrition Recommendations/Plan:  
- Continue current nutrition interventions. Assistance/ encouragement with meals and supplements as needed Nutrition Assessment:  Pt having poor/fair meal intake during recent days per chart documentation. viewed breakfast tray this morning; ate ~75% of meal. tolerating diet. Pt confused, requiring assistance/ encouragement with meals sometimes. did not drink  his ensure this morning. Malnutrition Assessment: 
Malnutrition Status: No malnutrition Estimated Daily Nutrient Needs: 
Energy (kcal):  0256-1001 Protein (g):  58-72 Fluid (ml/day):  7030-8465 Nutrition Related Findings:  BM 11/11. Folic acid, MVI & thiamine. Confusion. Hypernatremia resolved; WNL on 11/11. encourage po intake Current Nutrition Therapies: DIET CARDIAC Regular DIET NUTRITIONAL SUPPLEMENTS Breakfast, Dinner; Ensure [employer] DIET NUTRITIONAL SUPPLEMENTS Lunch; [place] Anthropometric Measures: 
· Height:  5' 9\" (175.3 cm) · Current Body Wt:  71.2 kg (157 lb) · BMI: 23.2 Nutrition Diagnosis: · Predicted inadequate energy intake related to cognitive or neurological impairment as evidenced by intake 26-50%(fair intake of supplements) Nutrition Intervention: 
Food and/or Nutrient Delivery: Continue current diet, Vitamin supplement, Continue oral nutrition supplement Nutrition Education and Counseling: No recommendations at this time Coordination of Nutrition Care: Continue to monitor while inpatient, Coordination of community care(pt discussed with nursing) Goals: 
PO nutrition intake will meet >75% of patient estimated nutritional needs within the next 7 days. Nutrition Monitoring and Evaluation:  
Behavioral-Environmental Outcomes: None identified Food/Nutrient Intake Outcomes: Diet advancement/tolerance, Food and nutrient intake, Supplement intake, Vitamin/mineral intake Physical Signs/Symptoms Outcomes: Biochemical data, Chewing or swallowing, Meal time behavior, Nutrition focused physical findings Discharge Planning:   
Continue oral nutrition supplement, Continue current diet Electronically signed by Luis Porter RD on 11/13/2020 at 2:47 PM 
 
Contact Number: [phone]

## 2020-11-13 NOTE — ROUTINE PROCESS
Bedside shift change report given to [address] [place] [address] (oncoming nurse) by Sheila Guido (offgoing nurse). Report included the following information SBAR, Procedure Summary, MAR and Recent Results.

## 2020-11-13 NOTE — ROUTINE PROCESS
Pt confused, combative, and ready to walk around, pt refusing morning meds at this time, will continue to redirect and try to give meds once again.

## 2020-11-13 NOTE — PROGRESS NOTES
Patient noted to have blood dripping from his penis after urinating. After assessment a small scratch is noted on the side of his penis of unknown etiology. Discussed with Dr. Gretchen Trevino who says he will probably order a urine and will review medications.    
 
Yung Roa, MSN, RN

## 2020-11-13 NOTE — PROGRESS NOTES
Patient has been accepted to [facility], but no beds available. Called Francisco Javier, from [facility], to check on acceptance to [address] Marc Whelan stated she would check on bed availability for LTC with medicaid pending. Francisco Javier stated she will call this writer back. Ann Stinson RN BSN Care Manager [phone]

## 2020-11-13 NOTE — PROGRESS NOTES
Hospitalist Progress Note Patient: Sirena Montanez Age: 79 y.o. : 1940 MR#: [id number] SSN: [id number] Date/Time: 2020 10:26 AM 
 
DOA: 10/29/2020 PCP: None Subjective:  
 
Patient seen and evaluated, worked with PT today and they mentioned that his gait is a little more unsteady today. He is able to feed himself. I spoke with Rojelio Segovia, his daughter Jihan Eng [phone]) and updated on plan of care. She mentioned that his Medicaid application had been completed and has been filed. She is waiting to hear from them sometime soon. She also mentioned that she will be here later during the day to see him. He is currently on oral antibiotics for his H.pylori. He has finished 3 days of ciprofloxacin for suspected UTI, his urine grew mix gilda. I update that he remains confused and will require LTC or lockdown unit for his safety He remains on metoprolol for his HTN and AFIB, he cannot be on anticoagulation at this time to due recent GI bleed Otherwise, she is update on [employer] decline. She is working on medicaid application. I updated that since he is no longer requiring inpatient care, he can be discharged to home or to LTC facility Chart and  note reviewed. ROS: limited but able to answer No current fever/chills, no headache, no dizziness, no facial pain No swallowing pain, No chest pain, no palpitation, no shortness of breath, no abd pain, No diarrhea, no urinary complaint, no leg pain or swelling Assessment/Plan: 1. 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 
     -No clear evidence of infection, CT head without acute event. TSH normal 
     -MRI brain without stroke 2. Acute systolic heart failure, elevated proBNP but no evidence of volume overload With indeterminate troponin elevation on admission 3. Paroxymal atrial fibrillation with RVR, in rate control Unable to anticoagulate due to recent GI bleed 4. Hypernatremia due to lack of fluid intake, resolved 5. Acute renal insufficiency in the setting of lasix use and not maintain oral intake. Hypernatremia, due to decrease oral hydration 6. GI bleed due to Duodenal ulcer, s/p cauterize with bicap, s/p EGD Duodenal ulcer with biopsy showing H.pylori infection 7. Acute anemia due to blood loss, stable Hgb/Hct With Iron deficiency anemia 8. Alcohol positive on admission but no evidence of alcohol consumption  
      -patient confirmed that he does not drinks alcohol. 
      -negative alcohol level on repeat 9. Fall, mechanical vs syncope 10. Right upper extremity pain due to fall, resolved 11. H/o tobacco abuse 12. Right lower leg chronic wound, no infection ? PAD He is medically stable for discharge to LTC or home with 24hrs supervision I spoke with his family for plan of care. Currently, family is working with medicaid application for placementdaughter mentions that application has been submitted and she is waiting to hear back from them. Continue sitter for safety Continue low dose haldol BID to help with agitation and confusion Currently, he has no infection and not likely the etiology of his confusion. Neurology concerns for dementia that was acute worsened by his recent medical illness. No further recommendation from neurology. Prognosis of his neurological recovery is poor since his symptom has been since prior to June as reported by family Avoid Benzo for now Cont Amoxicillin/clarithromycin/PP for H.pylori Cont flomax Avoid lovenox. Continue Metoprolol. Cardiology has no further invasive workup. AVoid [address] for now Can consider ASA when GI bleeding risk is low. Nutritional support Cont oral medications. Agree with PT/OT assessment, he will likely need 24hrs supervision for safety concern. Can resume lasix every other day for now Limited 1.5 liter fluid. Avoid salt. IO monitoring. Cont to advance diet. Full code Disposition planning: spoke with [facility] for placement planning Spoke with Zachery Mar and updated on clinical status and plan of care. Zachery Mar mentioned that her other sister will be in here shortly to see him if possible. Additional Notes:   
Time spent >35 minutes Case discussed with:  [x]Patient  [x]Family  [x]Nursing  [x]Case Management DVT Prophylaxis:  []Lovenox  []Hep SQ  [x]SCDs  []Coumadin   []On Heparin gtt Signed By: Corrina Levy MD   
 2020 Objective:  
VS:  
Visit Vitals /78 (BP 1 Location: Left arm, BP Patient Position: Sitting) Pulse (!) 105 Temp 97.6 °F (36.4 °C) Resp 20 Ht 5' 9\" (1.753 m) Wt 71.2 kg (156 lb 15.5 oz) SpO2 99% BMI 23.18 kg/m² Tmax/24hrs: Temp (24hrs), Av.5 °F (36.4 °C), Min:97.1 °F (36.2 °C), Max:97.7 °F (36.5 °C) No intake or output data in the 24 hours ending 20 1049 Tele: afib General:  Cooperative, Not in acute distress, HEENT: PERRL, EOMI, supple neck, no JVD, dry oral mucosa Cardiovascular: S1S2 regular, no rub/gallop Pulmonary: air entry bilaterally, no wheezing, + crackle GI:  Soft, non tender, non distended, +bs, no guarding Extremities:  No pedal edema, +distal pulses appreciated Chronic right leg wound Neuro: AOx2, moving all extremities Additional:  
 
 
Current Facility-Administered Medications Medication Dose Route Frequency  melatonin tablet 6 mg  6 mg Oral QHS  haloperidoL (HALDOL) tablet 5 mg  5 mg Oral BID  influenza vaccine - (6 mos+)(PF) (FLUARIX/FLULAVAL/FLUZONE QUAD) injection 0.5 mL  0.5 mL IntraMUSCular PRIOR TO DISCHARGE  amoxicillin (AMOXIL) capsule 1,000 mg  1,000 mg Oral Q12H  clarithromycin (BIAXIN) 250 mg/5 mL oral suspension 500 mg  500 mg Oral Q12H  pantoprazole (PROTONIX) tablet 40 mg  40 mg Oral BID  thiamine HCL (B-1) tablet 100 mg  100 mg Oral DAILY  sucralfate (CARAFATE) tablet 1 g  1 g Oral AC&HS  
 metoprolol tartrate (LOPRESSOR) tablet 50 mg  50 mg Oral Q12H  
 metoprolol (LOPRESSOR) injection 5 mg  5 mg IntraVENous Q4H PRN  
 sodium chloride (NS) flush 5-40 mL  5-40 mL IntraVENous Q8H  
 sodium chloride (NS) flush 5-40 mL  5-40 mL IntraVENous PRN  
 furosemide (LASIX) tablet 20 mg  20 mg Oral DAILY  hydrALAZINE (APRESOLINE) 20 mg/mL injection 10 mg  10 mg IntraVENous Q6H PRN  
 [Held by provider] aspirin chewable tablet 81 mg  81 mg Oral DAILY  therapeutic multivitamin (THERAGRAN) tablet 1 Tab  1 Tab Oral DAILY  folic acid (FOLVITE) tablet 1 mg  1 mg Oral DAILY  acetaminophen (TYLENOL) tablet 650 mg  650 mg Oral Q6H PRN Or  
 acetaminophen (TYLENOL) suppository 650 mg  650 mg Rectal Q6H PRN  polyethylene glycol (MIRALAX) packet 17 g  17 g Oral DAILY PRN  promethazine (PHENERGAN) tablet 12.5 mg  12.5 mg Oral Q6H PRN Or  
 ondansetron (ZOFRAN) injection 4 mg  4 mg IntraVENous Q6H PRN Lab/Data Review: 
Labs: Results:  
   
Chemistry Recent Labs 11/11/20 
9059 GLU 95   
K 3.8  CO2 27 BUN 32* CREA 1.11  
BUCR 29* AGAP 6  
CA 8.2* No results for input(s): TBIL, ALT, ALKP, TP, ALB, GLOB, AGRAT in the last 72 hours. No lab exists for component: SGOT  
CBC w/Diff Recent Labs 11/11/20 
1248 WBC 8.5  
RBC 2.81* HGB 8.9* HCT 28.8*  
.5*  
MCH 31.7 MCHC 30.9*  
RDW 15.3*  
 Coagulation No results for input(s): PTP, INR, APTT, INREXT, INREXT in the last 72 hours. Iron/Ferritin Lab Results Component Value Date/Time  Iron 48 (L) 11/03/2020 12:55 AM  
 TIBC 232 (L) 11/03/2020 12:55 AM  
 Iron % saturation 21 11/03/2020 12:55 AM  
 Ferritin 63 11/03/2020 12:55 AM  
 BNP   
Cardiac Enzymes Lab Results Component Value Date/Time CK 83 10/30/2020 08:14 AM  
 CK - MB 1.2 10/30/2020 08:14 AM  
 CK-MB Index 1.4 10/30/2020 08:14 AM  
 Troponin-I, QT 0.04 10/30/2020 08:14 AM  
 
  
Lactic Acid Thyroid Studies All Micro Results Procedure Component Value Units Date/Time CULTURE, URINE [[id number]] Collected:  11/05/20 1900 Order Status:  Completed Specimen:  Cath Urine Updated:  11/07/20 1039 Special Requests: NO SPECIAL REQUESTS [place] Count --     
  [id number] COLONIES/mL Culture result:    
  MIXED UROGENITAL ENMANUEL ISOLATED Images: 
 
CT (Most Recent). CT Results (most recent): 
Results from Hospital Encounter encounter on 10/29/20 CT HEAD WO CONT Narrative CT OF THE BRAIN 
 
COMPARISON: None. INDICATIONS: Fall and memory loss. TECHNIQUE: Volumetric data acquisition was performed   through the brain on a 
multislice scanner and reconstructed in the axial plane. FINDINGS:   
 
The ventricles and CSF spaces are enlarged consistent with age associated 
atrophy. There is no midline shift. Osie Ra The brain parenchyma is of generally normal attenuation. There is decreased 
attenuation in the periventricular white matter consistent with presumed 
microvascular disease. There is no hemorrhage evident. There are no pathologic fluid collections. There are no pathologic calcifications. . 
. The visible portions of the paranasal sinuses: Are clear. Impression IMPRESSION:  
 
Atrophy and microvascular disease. No acute intracranial process. All CT scans at this facility are performed using dose optimization technique as 
appropriate to the performed exam, to include automated exposure control, 
adjustment of the mA and/or kV according to patient's size (Including 
appropriate matching for site-specific examinations), or use of iterative 
reconstruction technique.    
 
 
MRI Results (most recent): 
 Results from Hospital Encounter encounter on 10/29/20 MRI BRAIN WO CONT Narrative Brain MR without contrast 
 
HISTORY: Confusion, memory loss, hallucinations and falls. COMPARISON: CT 10/29/2020 TECHNIQUE: Brain scanned with sagittal and axial T1W scans, axial T2W , axial 
FLAIR, axial diffusion weighted images and SWAN. FINDINGS: Details are limited by mild-to-moderate motion artifact. Cerebral parenchyma: No restricted diffusion abnormalities to suggest acute 
stroke. No evidence of edema, mass effect or mass lesion. No significant T2 or FLAIR hyperintense abnormalities. Prominent symmetric susceptibility hypointense 
signal in the basal ganglia. Brain volumes and ventricular system: Normal in size and morphology for the 
patient's age. Midline structures: Normal. 
 
Cerebellum: Normal. 
 
Brainstem: Normal. 
 
Vascular system: Expected arterial flow voids are present at the base of brain. Calvarium and skull base: Normal. 
 
Paranasal sinuses and mastoid air cells: Essentially clear. Couple of tiny mucus 
retention cysts in the maxillary sinuses. Visualized orbits: Unremarkable for a nondedicated exam. 
 
Visualized upper cervical spine: Unremarkable for a nondedicated exam. 
  
 Impression IMPRESSION: 
 
1. No acute brain abnormalities. 2.  Symmetric prominent susceptibility artifact in the basal ganglia suggesting 
increased iron/mineral deposition. XRAY (Most Recent) EKG No results found for this or any previous visit. 2D ECHO 10/29/20 ECHO ADULT COMPLETE 10/31/2020 10/31/2020 Narrative · LV: Estimated LVEF is 40 - 45%. Visually measured ejection fraction. Normal cavity size. Mild concentric hypertrophy. · RV: Mildly dilated right ventricle. Mildly reduced systolic function. · PA: Mild pulmonary hypertension. Pulmonary arterial systolic pressure is 46 mmHg. · IVC: Moderately elevated central venous pressure (10-15 mmHg);  IVC  
 diameter is larger than 21 mm and collapses more than 50% with  
respiration.  
   
  Signed by: Beata Shepherd MD

## 2020-11-14 NOTE — PROGRESS NOTES
[facility] Fall Assessment Note Patient: Karolina Morelos MRN: [id number] SSN: [id number]  YOB: 1940 Age: 79 y.o. Sex: male Admit date: 10/29/2020 Length of stay:  LOS: 15 days PCP: None PP: Atrial fibrillation with rapid ventricular response (HCC) Subjective:  
Called to bedside by nursing staff for fall evaluation. Objective:  
Vital Signs: 
Visit Vitals /79 (BP 1 Location: Right arm, BP Patient Position: At rest) Pulse 75 Temp 98.5 °F (36.9 °C) Resp 14 Ht 5' 9\" (1.753 m) Wt 71.2 kg (156 lb 15.5 oz) SpO2 98% BMI 23.18 kg/m² Physical Exam: 
General appearance: confused, opens eyes, said thank you, but not following commands, not answering questions appropriately Lungs: clear to auscultation bilaterally Heart: regular rate and rhythm, S1, S2 normal, no murmur, click, rub or gallop Abdomen: soft, non-tender. Bowel sounds normal. 
Skin: multiple old bruises, bandage on left hand from Neuro:AXO XO moves all four extremities Assessment and Plan:  
79 y.o. yo male admitted for Atrial fibrillation with rapid ventricular response ([address] s/p fall in house. Patient confused at baseline. Discussed with Dr. Lisa Tejada about ordering stat CT head. Also ordering restraints and fall precautions. Pt not following commands on neuro exam thus not able to evaluate cranial nerves II-XII. But no neck tenderness to palpation, nor are there structural abnormalities. Rommel Rivera PGY-1  
Jose Mccarthy Senior Pager: [phone] November 13, 2020, 8:26 PM

## 2020-11-14 NOTE — ROUTINE PROCESS
Nurse found pt on floor while conducting final rounding before shift change. Pt seated on the floor, when asked if he hit his head, he stated no, noted skin tear to right hand. Set of vital signs taken and dressing applied to skin tear. Pt had non-skid socks on. [facility] notified  And no new orders received and nursing supervisor notified. Pt assisted back in bed, all extremities moving well, pt still confused and pt asleep at this time. Oncoming nurse made aware.

## 2020-11-14 NOTE — ROUTINE PROCESS
Bedside and Verbal shift change report given to Coco Johnson RN (oncoming nurse) by Remedios Duckworth RN (offgoing nurse). Report included the following information SBAR, Kardex and Recent Results.

## 2020-11-14 NOTE — ROUTINE PROCESS
Bedside shift change report given to 1900 Donna Wilcox (oncoming nurse) by Mar Chandler RN (offgoing nurse). Report included the following information Kardex, Intake/Output and Recent Results.

## 2020-11-14 NOTE — PROGRESS NOTES
Hospitalist Progress Note Patient: Lennox Brush Age: 79 y.o. : 1940 MR#: [id number] SSN: [id number] Date/Time: 2020 10:26 AM 
 
DOA: 10/29/2020 PCP: None Subjective:  
 
Patient seen and evaluated, worked with PT today and they mentioned that his gait is a little more unsteady today. He is able to feed himself. He had a fall last evening - daughter Almaz Bello notified by nursing staff I spoke with Almaz Bello, his daughter Krissy Hanks [phone]) and updated on plan of care. She mentioned that his Medicaid application had been completed and has been filed. She is waiting to hear from them sometime soon. She also mentioned that she will be here later during the day to see him. He is currently on oral antibiotics for his H.pylori. He has finished 3 days of ciprofloxacin for suspected UTI, his urine grew mix gilda. I update that he remains confused and will require LTC or lockdown unit for his safety He remains on metoprolol for his HTN and AFIB, he cannot be on anticoagulation at this time to due recent GI bleed Otherwise, she is update on [employer] decline. She is working on medicaid application. I updated that since he is no longer requiring inpatient care, he can be discharged to home or to LTC facility Chart and  note reviewed. ROS: limited but able to answer No current fever/chills, no headache, no dizziness, no facial pain No swallowing pain, No chest pain, no palpitation, no shortness of breath, no abd pain, No diarrhea, no urinary complaint, no leg pain or swelling Assessment/Plan: 1. 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 
     -No clear evidence of infection, CT head without acute event. TSH normal 
     -MRI brain without stroke 2.   Acute systolic heart failure, elevated proBNP but no evidence of volume overload With indeterminate troponin elevation on admission 3. Paroxymal atrial fibrillation with RVR, in rate control Unable to anticoagulate due to recent GI bleed 4. Hypernatremia due to lack of fluid intake, resolved 5. Acute renal insufficiency in the setting of lasix use and not maintain oral intake. Hypernatremia, due to decrease oral hydration 6. GI bleed due to Duodenal ulcer, s/p cauterize with bicap, s/p EGD Duodenal ulcer with biopsy showing H.pylori infection 7. Acute anemia due to blood loss, stable Hgb/Hct With Iron deficiency anemia 8. Alcohol positive on admission but no evidence of alcohol consumption  
      -patient confirmed that he does not drinks alcohol. 
      -negative alcohol level on repeat 9. Fall, mechanical vs syncope 10. Right upper extremity pain due to fall, resolved 11. H/o tobacco abuse 12. Right lower leg chronic wound, no infection ? PAD He is medically stable for discharge to LTC or home with 24hrs supervision I spoke with his family for plan of care. Currently, family is working with medicaid application for placementdaughter mentions that application has been submitted and she is waiting to hear back from them. Continue sitter for safety Continue low dose haldol BID to help with agitation and confusion Currently, he has no infection and not likely the etiology of his confusion. Neurology concerns for dementia that was acute worsened by his recent medical illness. No further recommendation from neurology. Prognosis of his neurological recovery is poor since his symptom has been since prior to June as reported by family Avoid Benzo for now Cont Amoxicillin/clarithromycin/PP for H.pylori Cont flomax Avoid lovenox. Continue Metoprolol. Cardiology has no further invasive workup. AVoid [facility] for now Can consider ASA when GI bleeding risk is low. Nutritional support Cont oral medications. Agree with PT/OT assessment, he will likely need 24hrs supervision for safety concern. Can resume lasix every other day for now Limited 1.5 liter fluid. Avoid salt. IO monitoring. Cont to advance diet. Full code Disposition planning: spoke with ICM for placement planning Spoke with Edgar Menon and updated on clinical status and plan of care. Said sister will be in later today to sit with pt Additional Notes:   
Time spent >35 minutes Case discussed with:  [x]Patient  [x]Family  [x]Nursing  [x]Case Management DVT Prophylaxis:  []Lovenox  []Hep SQ  [x]SCDs  []Coumadin   []On Heparin gtt Signed By: Afton Nyhan, MD   
 2020 Objective:  
VS:  
Visit Vitals /73 Pulse 99 Temp 97 °F (36.1 °C) Resp 17 Ht 5' 9\" (1.753 m) Wt 71.2 kg (156 lb 15.5 oz) SpO2 96% BMI 23.18 kg/m² Tmax/24hrs: Temp (24hrs), Av.8 °F (36.6 °C), Min:97 °F (36.1 °C), Max:98.5 °F (36.9 °C) Intake/Output Summary (Last 24 hours) at 2020 1025 Last data filed at 2020 0500 Gross per 24 hour Intake  Output 180 ml Net -180 ml Tele: afib General:  Cooperative, Not in acute distress, HEENT: PERRL, EOMI, supple neck, no JVD, dry oral mucosa Cardiovascular: S1S2 regular, no rub/gallop Pulmonary: air entry bilaterally, no wheezing, + crackle GI:  Soft, non tender, non distended, +bs, no guarding Extremities:  No pedal edema, +distal pulses appreciated Chronic right leg wound Neuro: AOx2, moving all extremities Additional:  
 
 
Current Facility-Administered Medications Medication Dose Route Frequency  melatonin tablet 6 mg  6 mg Oral QHS  haloperidoL (HALDOL) tablet 5 mg  5 mg Oral BID  influenza vaccine  (6 mos+)(PF) (FLUARIX/FLULAVAL/FLUZONE QUAD) injection 0.5 mL  0.5 mL IntraMUSCular PRIOR TO DISCHARGE  
  amoxicillin (AMOXIL) capsule 1,000 mg  1,000 mg Oral Q12H  clarithromycin (BIAXIN) 250 mg/5 mL oral suspension 500 mg  500 mg Oral Q12H  pantoprazole (PROTONIX) tablet 40 mg  40 mg Oral BID  thiamine HCL (B-1) tablet 100 mg  100 mg Oral DAILY  sucralfate (CARAFATE) tablet 1 g  1 g Oral AC&HS  
 metoprolol tartrate (LOPRESSOR) tablet 50 mg  50 mg Oral Q12H  
 metoprolol (LOPRESSOR) injection 5 mg  5 mg IntraVENous Q4H PRN  
 sodium chloride (NS) flush 5-40 mL  5-40 mL IntraVENous Q8H  
 sodium chloride (NS) flush 5-40 mL  5-40 mL IntraVENous PRN  
 furosemide (LASIX) tablet 20 mg  20 mg Oral DAILY  hydrALAZINE (APRESOLINE) 20 mg/mL injection 10 mg  10 mg IntraVENous Q6H PRN  
 [Held by provider] aspirin chewable tablet 81 mg  81 mg Oral DAILY  therapeutic multivitamin (THERAGRAN) tablet 1 Tab  1 Tab Oral DAILY  folic acid (FOLVITE) tablet 1 mg  1 mg Oral DAILY  acetaminophen (TYLENOL) tablet 650 mg  650 mg Oral Q6H PRN Or  
 acetaminophen (TYLENOL) suppository 650 mg  650 mg Rectal Q6H PRN  polyethylene glycol (MIRALAX) packet 17 g  17 g Oral DAILY PRN  promethazine (PHENERGAN) tablet 12.5 mg  12.5 mg Oral Q6H PRN Or  
 ondansetron (ZOFRAN) injection 4 mg  4 mg IntraVENous Q6H PRN Lab/Data Review: 
Labs: Results:  
   
Chemistry No results for input(s): GLU, NA, K, CL, CO2, BUN, CREA, BUCR, AGAP, CA, PHOS in the last 72 hours. No results for input(s): TBIL, ALT, ALKP, TP, ALB, GLOB, AGRAT in the last 72 hours. No lab exists for component: SGOT  
CBC w/Diff No results for input(s): WBC, RBC, HGB, HCT, MCV, MCH, MCHC, RDW, PLT, BANDS, GRANS, LYMPH, EOS, HGBEXT, HCTEXT, PLTEXT, HGBEXT, HCTEXT, PLTEXT in the last 72 hours. Coagulation No results for input(s): PTP, INR, APTT, INREXT, INREXT in the last 72 hours. Iron/Ferritin Lab Results Component Value Date/Time  Iron 48 (L) 11/03/2020 12:55 AM  
 TIBC 232 (L) 11/03/2020 12:55 AM  
 Iron % saturation 21 11/03/2020 12:55 AM  
 Ferritin 63 11/03/2020 12:55 AM  
   
BNP Cardiac Enzymes Lab Results Component Value Date/Time CK 83 10/30/2020 08:14 AM  
 CK - MB 1.2 10/30/2020 08:14 AM  
 CK-MB Index 1.4 10/30/2020 08:14 AM  
 Troponin-I, QT 0.04 10/30/2020 08:14 AM  
 
  
Lactic Acid Thyroid Studies All Micro Results Procedure Component Value Units Date/Time CULTURE, URINE [[id number]] Collected:  11/05/20 1900 Order Status:  Completed Specimen:  Cath Urine Updated:  11/07/20 1034 Special Requests: NO SPECIAL REQUESTS [facility] Count --     
  [id number] COLONIES/mL Culture result:    
  MIXED UROGENITAL ENMANUEL ISOLATED Images: 
 
CT (Most Recent). CT Results (most recent): 
Results from Hospital Encounter encounter on 10/29/20 CT HEAD WO CONT Narrative CT OF THE BRAIN 
 
COMPARISON: None. INDICATIONS: Fall and memory loss. TECHNIQUE: Volumetric data acquisition was performed   through the brain on a 
multislice scanner and reconstructed in the axial plane. FINDINGS:   
 
The ventricles and CSF spaces are enlarged consistent with age associated 
atrophy. There is no midline shift. Mcnamara Quince The brain parenchyma is of generally normal attenuation. There is decreased 
attenuation in the periventricular white matter consistent with presumed 
microvascular disease. There is no hemorrhage evident. There are no pathologic fluid collections. There are no pathologic calcifications. . 
. The visible portions of the paranasal sinuses: Are clear. Impression IMPRESSION:  
 
Atrophy and microvascular disease. No acute intracranial process. All CT scans at this facility are performed using dose optimization technique as 
appropriate to the performed exam, to include automated exposure control, 
adjustment of the mA and/or kV according to patient's size (Including appropriate matching for site-specific examinations), or use of iterative 
reconstruction technique. MRI Results (most recent): 
Results from Hospital Encounter encounter on 10/29/20 MRI BRAIN WO CONT Narrative Brain MR without contrast 
 
HISTORY: Confusion, memory loss, hallucinations and falls. COMPARISON: CT 10/29/2020 TECHNIQUE: Brain scanned with sagittal and axial T1W scans, axial T2W , axial 
FLAIR, axial diffusion weighted images and SWAN. FINDINGS: Details are limited by mild-to-moderate motion artifact. Cerebral parenchyma: No restricted diffusion abnormalities to suggest acute 
stroke. No evidence of edema, mass effect or mass lesion. No significant T2 or FLAIR hyperintense abnormalities. Prominent symmetric susceptibility hypointense 
signal in the basal ganglia. Brain volumes and ventricular system: Normal in size and morphology for the 
patient's age. Midline structures: Normal. 
 
Cerebellum: Normal. 
 
Brainstem: Normal. 
 
Vascular system: Expected arterial flow voids are present at the base of brain. Calvarium and skull base: Normal. 
 
Paranasal sinuses and mastoid air cells: Essentially clear. Couple of tiny mucus 
retention cysts in the maxillary sinuses. Visualized orbits: Unremarkable for a nondedicated exam. 
 
Visualized upper cervical spine: Unremarkable for a nondedicated exam. 
  
 Impression IMPRESSION: 
 
1. No acute brain abnormalities. 2.  Symmetric prominent susceptibility artifact in the basal ganglia suggesting 
increased iron/mineral deposition. XRAY (Most Recent) EKG No results found for this or any previous visit. 2D ECHO 10/29/20 ECHO ADULT COMPLETE 10/31/2020 10/31/2020 Narrative · LV: Estimated LVEF is 40 - 45%. Visually measured ejection fraction. Normal cavity size. Mild concentric hypertrophy. · RV: Mildly dilated right ventricle. Mildly reduced systolic function. · PA: Mild pulmonary hypertension. Pulmonary arterial systolic pressure is 46 mmHg. · IVC: Moderately elevated central venous pressure (10-15 mmHg); IVC  
diameter is larger than 21 mm and collapses more than 50% with  
respiration.  
   
  Signed by: Sally Domingo MD

## 2020-11-14 NOTE — PROGRESS NOTES
Problem: Falls - Risk of 
Goal: *Absence of Falls Description: Document Yelena Lunsford Fall Risk and appropriate interventions in the flowsheet. Outcome: Progressing Towards Goal 
Note: Fall Risk Interventions: 
Mobility Interventions: Bed/chair exit alarm Mentation Interventions: Bed/chair exit alarm, Door open when patient unattended, Adequate sleep, hydration, pain control Medication Interventions: Bed/chair exit alarm, Patient to call before getting OOB, Teach patient to arise slowly Elimination Interventions: Bed/chair exit alarm, Call light in reach, Patient to call for help with toileting needs History of Falls Interventions: Bed/chair exit alarm, Door open when patient unattended Problem: Patient Education: Go to Patient Education Activity Goal: Patient/Family Education Outcome: Progressing Towards Goal 
  
Problem: Non-Violent Restraints Goal: *Removal from restraints as soon as assessed to be safe Outcome: Progressing Towards Goal 
Goal: *No harm/injury to patient while restraints in use Outcome: Progressing Towards Goal 
Goal: *Patient's dignity will be maintained Outcome: Progressing Towards Goal 
Goal: *Patient Specific Goal (EDIT GOAL, INSERT TEXT) Outcome: Progressing Towards Goal 
Goal: Non-violent Restaints:Standard Interventions Outcome: Progressing Towards Goal 
Goal: Non-violent Restraints:Patient Interventions Outcome: Progressing Towards Goal 
Goal: Patient/Family Education Outcome: Progressing Towards Goal 
  
Problem: Nutrition Deficit Goal: *Optimize nutritional status Outcome: Progressing Towards Goal 
  
Problem: Patient Education: Go to Patient Education Activity Goal: Patient/Family Education Outcome: Progressing Towards Goal 
  
Problem: Patient Education: Go to Patient Education Activity Goal: Patient/Family Education Outcome: Progressing Towards Goal 
  
Problem: Pressure Injury - Risk of 
Goal: *Prevention of pressure injury Description: Document Rafa Scale and appropriate interventions in the flowsheet. Outcome: Progressing Towards Goal 
Note: Pressure Injury Interventions: 
Sensory Interventions: Assess need for specialty bed Moisture Interventions: Absorbent underpads, Apply protective barrier, creams and emollients Activity Interventions: Assess need for specialty bed Mobility Interventions: Assess need for specialty bed, Pressure redistribution bed/mattress (bed type), PT/OT evaluation Nutrition Interventions: Document food/fluid/supplement intake Friction and Shear Interventions: Minimize layers Problem: Patient Education: Go to Patient Education Activity Goal: Patient/Family Education Outcome: Progressing Towards Goal

## 2020-11-15 NOTE — PROGRESS NOTES
Hospitalist Progress Note Patient: Sravani Hannah Age: 79 y.o. : 1940 MR#: [id number] SSN: [id number] Date/Time: 11/15/2020 10:26 AM 
 
DOA: 10/29/2020 PCP: None Subjective:  
 
Patient seen and evaluated, worked with PT today and they mentioned that his gait is a little more unsteady today. He is able to feed himself. He had a fall last evening  - daughter Zachery Mar notified by nursing staff I spoke with Zachery Mar, his daughter Andrey Akers [phone]) and updated on plan of care. She mentioned that his Medicaid application had been completed and has been filed. She is waiting to hear from them sometime soon. She also mentioned that she will be here later during the day to see him. He is currently on oral antibiotics for his H.pylori. He has finished 3 days of ciprofloxacin for suspected UTI, his urine grew mix gilda. I update that he remains confused and will require LTC or lockdown unit for his safety He remains on metoprolol for his HTN and AFIB, he cannot be on anticoagulation at this time to due recent GI bleed Otherwise, she is update on [employer] decline. She is working on medicaid application. I updated that since he is no longer requiring inpatient care, he can be discharged to home or to LTC facility Chart and  note reviewed. ROS: limited but able to answer No current fever/chills, no headache, no dizziness, no facial pain No swallowing pain, No chest pain, no palpitation, no shortness of breath, no abd pain, No diarrhea, no urinary complaint, no leg pain or swelling Assessment/Plan: 1. 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 
     -No clear evidence of infection, CT head without acute event. TSH normal 
     -MRI brain without stroke 2.   Acute systolic heart failure, elevated proBNP but no evidence of volume overload With indeterminate troponin elevation on admission 3. Paroxymal atrial fibrillation with RVR, in rate control Unable to anticoagulate due to recent GI bleed 4. Hypernatremia due to lack of fluid intake, resolved 5. Acute renal insufficiency in the setting of lasix use and not maintain oral intake. Hypernatremia, due to decrease oral hydration 6. GI bleed due to Duodenal ulcer, s/p cauterize with bicap, s/p EGD Duodenal ulcer with biopsy showing H.pylori infection 7. Acute anemia due to blood loss, stable Hgb/Hct With Iron deficiency anemia 8. Alcohol positive on admission but no evidence of alcohol consumption  
      -patient confirmed that he does not drinks alcohol. 
      -negative alcohol level on repeat 9. Fall, mechanical vs syncope 10. Right upper extremity pain due to fall, resolved 11. H/o tobacco abuse 12. Right lower leg chronic wound, no infection ? PAD He is medically stable for discharge to LTC or home with 24hrs supervision I spoke with his family for plan of care. Medicaid application completed & submitted , awaiting to hear from SNF regarding acceptance. Continue low dose haldol BID to help with agitation and confusion. Currently, he has no infection and not likely the etiology of his confusion. Neurology concerns for dementia that was acute worsened by his recent medical illness. No further recommendation from neurology. Prognosis of his neurological recovery is poor since his symptom has been since prior to June as reported by family Avoid Benzo for now Cont Amoxicillin/clarithromycin/PP for H.pylori Cont flomax Avoid lovenox. Continue Metoprolol. Cardiology has no further invasive workup. [address]oid [address] for now Can consider ASA when GI bleeding risk is low. Nutritional support Cont oral medications. Agree with PT/OT assessment, he will likely need 24hrs supervision for safety concern. Can resume lasix every other day for now Limited 1.5 liter fluid. Avoid salt. IO monitoring. Cont to advance diet. Full code Disposition planning: spoke with [facility] for placement planning Spoke with Brittaney Avila at bedside and updated on clinical status and plan of care. Will follow Additional Notes:   
Time spent >35 minutes Case discussed with:  [x]Patient  [x]Family  [x]Nursing  [x]Case Management DVT Prophylaxis:  []Lovenox  []Hep SQ  [x]SCDs  []Coumadin   []On Heparin gtt Signed By: Margo Farris MD   
 November 15, 2020 Objective:  
VS:  
Visit Vitals /84 (BP 1 Location: Left arm, BP Patient Position: At rest) Pulse (!) 104 Temp 97.4 °F (36.3 °C) Resp 19 Ht 5' 9\" (1.753 m) Wt 71.2 kg (156 lb 15.5 oz) SpO2 95% BMI 23.18 kg/m² Tmax/24hrs: Temp (24hrs), Av.8 °F (36.6 °C), Min:97.4 °F (36.3 °C), Max:98.1 °F (36.7 °C) Intake/Output Summary (Last 24 hours) at 11/15/2020 1520 Last data filed at 11/15/2020 0710 Gross per 24 hour Intake 470 ml Output  Net 470 ml Tele: afib General:  Cooperative, Not in acute distress, HEENT: PERRL, EOMI, supple neck, no JVD, dry oral mucosa Cardiovascular: S1S2 regular, no rub/gallop Pulmonary: air entry bilaterally, no wheezing, + crackle GI:  Soft, non tender, non distended, +bs, no guarding Extremities:  No pedal edema, +distal pulses appreciated Chronic right leg wound Neuro: AOx2, moving all extremities Additional:  
 
 
Current Facility-Administered Medications Medication Dose Route Frequency  melatonin tablet 6 mg  6 mg Oral QHS  haloperidoL (HALDOL) tablet 5 mg  5 mg Oral BID  influenza vaccine  (6 mos+)(PF) (FLUARIX/FLULAVAL/FLUZONE QUAD) injection 0.5 mL  0.5 mL IntraMUSCular PRIOR TO DISCHARGE  amoxicillin (AMOXIL) capsule 1,000 mg  1,000 mg Oral Q12H  clarithromycin (BIAXIN) 250 mg/5 mL oral suspension 500 mg  500 mg Oral Q12H  pantoprazole (PROTONIX) tablet 40 mg  40 mg Oral BID  thiamine HCL (B-1) tablet 100 mg  100 mg Oral DAILY  sucralfate (CARAFATE) tablet 1 g  1 g Oral AC&HS  
 metoprolol tartrate (LOPRESSOR) tablet 50 mg  50 mg Oral Q12H  
 metoprolol (LOPRESSOR) injection 5 mg  5 mg IntraVENous Q4H PRN  
 sodium chloride (NS) flush 5-40 mL  5-40 mL IntraVENous Q8H  
 sodium chloride (NS) flush 5-40 mL  5-40 mL IntraVENous PRN  
 furosemide (LASIX) tablet 20 mg  20 mg Oral DAILY  hydrALAZINE (APRESOLINE) 20 mg/mL injection 10 mg  10 mg IntraVENous Q6H PRN  
 [Held by provider] aspirin chewable tablet 81 mg  81 mg Oral DAILY  therapeutic multivitamin (THERAGRAN) tablet 1 Tab  1 Tab Oral DAILY  folic acid (FOLVITE) tablet 1 mg  1 mg Oral DAILY  acetaminophen (TYLENOL) tablet 650 mg  650 mg Oral Q6H PRN Or  
 acetaminophen (TYLENOL) suppository 650 mg  650 mg Rectal Q6H PRN  polyethylene glycol (MIRALAX) packet 17 g  17 g Oral DAILY PRN  promethazine (PHENERGAN) tablet 12.5 mg  12.5 mg Oral Q6H PRN Or  
 ondansetron (ZOFRAN) injection 4 mg  4 mg IntraVENous Q6H PRN Lab/Data Review: 
Labs: Results:  
   
Chemistry No results for input(s): GLU, NA, K, CL, CO2, BUN, CREA, BUCR, AGAP, CA, PHOS in the last 72 hours. No results for input(s): TBIL, ALT, ALKP, TP, ALB, GLOB, AGRAT in the last 72 hours. No lab exists for component: SGOT  
CBC w/Diff No results for input(s): WBC, RBC, HGB, HCT, MCV, MCH, MCHC, RDW, PLT, BANDS, GRANS, LYMPH, EOS, HGBEXT, HCTEXT, PLTEXT, HGBEXT, HCTEXT, PLTEXT in the last 72 hours. Coagulation No results for input(s): PTP, INR, APTT, INREXT, INREXT in the last 72 hours. Iron/Ferritin Lab Results Component Value Date/Time  Iron 48 (L) 11/03/2020 12:55 AM  
 TIBC 232 (L) 11/03/2020 12:55 AM  
 Iron % saturation 21 11/03/2020 12:55 AM  
 Ferritin 63 11/03/2020 12:55 AM  
   
 BNP   
Cardiac Enzymes Lab Results Component Value Date/Time CK 83 10/30/2020 08:14 AM  
 CK - MB 1.2 10/30/2020 08:14 AM  
 CK-MB Index 1.4 10/30/2020 08:14 AM  
 Troponin-I, QT 0.04 10/30/2020 08:14 AM  
 
  
Lactic Acid Thyroid Studies All Micro Results Procedure Component Value Units Date/Time CULTURE, URINE [[id number]] Collected:  11/05/20 1900 Order Status:  Completed Specimen:  Cath Urine Updated:  11/07/20 1039 Special Requests: NO SPECIAL REQUESTS [place] Count --     
  [id number] COLONIES/mL Culture result:    
  MIXED UROGENITAL ENMANUEL ISOLATED Images: 
 
CT (Most Recent). CT Results (most recent): 
Results from Hospital Encounter encounter on 10/29/20 CT HEAD WO CONT Narrative CT OF THE BRAIN 
 
COMPARISON: None. INDICATIONS: Fall and memory loss. TECHNIQUE: Volumetric data acquisition was performed   through the brain on a 
multislice scanner and reconstructed in the axial plane. FINDINGS:   
 
The ventricles and CSF spaces are enlarged consistent with age associated 
atrophy. There is no midline shift. Monroe Kirks The brain parenchyma is of generally normal attenuation. There is decreased 
attenuation in the periventricular white matter consistent with presumed 
microvascular disease. There is no hemorrhage evident. There are no pathologic fluid collections. There are no pathologic calcifications. . 
. The visible portions of the paranasal sinuses: Are clear. Impression IMPRESSION:  
 
Atrophy and microvascular disease. No acute intracranial process. All CT scans at this facility are performed using dose optimization technique as 
appropriate to the performed exam, to include automated exposure control, 
adjustment of the mA and/or kV according to patient's size (Including 
appropriate matching for site-specific examinations), or use of iterative 
reconstruction technique.    
 
 
MRI Results (most recent): 
 Results from Hospital Encounter encounter on 10/29/20 MRI BRAIN WO CONT Narrative Brain MR without contrast 
 
HISTORY: Confusion, memory loss, hallucinations and falls. COMPARISON: CT 10/29/2020 TECHNIQUE: Brain scanned with sagittal and axial T1W scans, axial T2W , axial 
FLAIR, axial diffusion weighted images and SWAN. FINDINGS: Details are limited by mild-to-moderate motion artifact. Cerebral parenchyma: No restricted diffusion abnormalities to suggest acute 
stroke. No evidence of edema, mass effect or mass lesion. No significant T2 or FLAIR hyperintense abnormalities. Prominent symmetric susceptibility hypointense 
signal in the basal ganglia. Brain volumes and ventricular system: Normal in size and morphology for the 
patient's age. Midline structures: Normal. 
 
Cerebellum: Normal. 
 
Brainstem: Normal. 
 
Vascular system: Expected arterial flow voids are present at the base of brain. Calvarium and skull base: Normal. 
 
Paranasal sinuses and mastoid air cells: Essentially clear. Couple of tiny mucus 
retention cysts in the maxillary sinuses. Visualized orbits: Unremarkable for a nondedicated exam. 
 
Visualized upper cervical spine: Unremarkable for a nondedicated exam. 
  
 Impression IMPRESSION: 
 
1. No acute brain abnormalities. 2.  Symmetric prominent susceptibility artifact in the basal ganglia suggesting 
increased iron/mineral deposition. XRAY (Most Recent) EKG No results found for this or any previous visit. 2D ECHO 10/29/20 ECHO ADULT COMPLETE 10/31/2020 10/31/2020 Narrative · LV: Estimated LVEF is 40 - 45%. Visually measured ejection fraction. Normal cavity size. Mild concentric hypertrophy. · RV: Mildly dilated right ventricle. Mildly reduced systolic function. · PA: Mild pulmonary hypertension. Pulmonary arterial systolic pressure is 46 mmHg. · IVC: Moderately elevated central venous pressure (10-15 mmHg);  IVC  
 diameter is larger than 21 mm and collapses more than 50% with  
respiration.  
   
  Signed by: Luis Rhodes MD

## 2020-11-15 NOTE — PROGRESS NOTES
Bedside shift change report given to [address] [place] [address] (oncoming nurse) by Anna Ruiz (offgoing nurse). Report included the following information SBAR.

## 2020-11-15 NOTE — PROGRESS NOTES
Problem: Self Care Deficits Care Plan (Adult) Goal: *Acute Goals and Plan of Care (Insert Text) Description: Occupational Therapy Goals Initiated 11/1/2020 within 7 day(s). Re-evaluated 11/7/20. Goal 1 Met, continue with all remaining goals. Re-evaluated on 11/15/20, due to lack of progress goals adjusted. Pt placed on 3-day trial 
 
1. Patient will perform functional task seated EOB following 75% commands with Supervision. 2.  Patient will perform upper body dressing with Minimal assistance and verbal cues for sequencing. 3.  Patient will perform lower body dressing with Minimal assistance and verbal cues for sequencing. 4.  Patient will perform toilet transfers with Minimal assistance using LRAD. Prior Level of Function: Pt reports living alone in private residence with family nearby. Pt reports being previously independent with I/ADLs, including driving. Pt has no DME available at home. Per conversation with pt's daughter pt has shower chair that he uses daily for bathing. Outcome: Progressing Towards Goal 
OCCUPATIONAL THERAPY RE-EVALUATION Patient: Lizzie Cockayne (24 y.o. male) Date: 11/15/2020 Primary Diagnosis: Atrial fibrillation with rapid ventricular response ([facility] [address] [I48.91] Procedure(s) (LRB): ENDOSCOPY w cautery w injection w bx's (N/A) 12 Days Post-Op Precautions:   Fall ASSESSMENT : 
Pt cleared to participate in OT evaluation by RN. Upon entering room, pt received in bed, with roll belt donned, appears confused and restless. Roll belt doffed in the beginning and re-applied at the end of the session. Pt agrees to maneuver to EOB for ADLs, however, was not able to follow simple commands to maneuver to EOB, instead performing scooting to [facility]. Pt performed grooming task, which he was able to complete following demonstration, when presented in-context of morning routine. Pt is not safe for OOB functional activities this date. Based on the objective data described below, the patient presents with decline in functional status comparing to previous re-evaluation. Pt with persistent confusion and limited commands following during past sessions, which limits his progress towards previous goals. Pt's goals were adjusted to increase safety of pt and staff during OT treatment sessions. Pt will be placed on 3-day trial at this time to determine pt's ability to purposefully participate in skilled OT. Patient will benefit from skilled intervention to address the above impairments. Patient's rehabilitation potential is considered to be Fair Factors which may influence rehabilitation potential include:  
[]             None noted [x]             Mental ability/status [x]             Medical condition [x]             Home/family situation and support systems [x]             Safety awareness []             Pain tolerance/management 
[]             Other: PLAN : 
Recommendations and Planned Interventions:  
[x]               Self Care Training                  [x]      Therapeutic Activities [x]               Functional Mobility Training   [x]      Cognitive Retraining 
[x]               Therapeutic Exercises           [x]      Endurance Activities [x]               Balance Training                    [x]      Neuromuscular Re-Education [x]               Visual/Perceptual Training     [x]      Home Safety Training 
[x]               Patient Education                   [x]      Family Training/Education []               Other (comment): Frequency/Duration: Patient will be followed by occupational therapy 1-2 times per day/4-7 days per week to address goals. 3-day trial 
Discharge Recommendations: Home Health with 24/7 assistance vs LTC. Further Equipment Recommendations for Discharge: TBD SUBJECTIVE:  
Patient stated Nicolasa Earl you here for my daughter?  OBJECTIVE DATA SUMMARY:  
 Hospital course since last seen and reason for reevaluation: Pt is due for re-evaluation. Pt demonstrates functional decline and difficulty participating in skilled OT during the past sessions. Pt will be placed on 3-day trial to determine appropriateness of further OT. Past Medical History:  
Diagnosis Date Ill-defined condition   
 mild memory loss with hallucinations History reviewed. No pertinent surgical history. Barriers to Learning/Limitations: yes;  cognitive and altered mental status (i.e.Sedation, Confusion) Compensate with: visual, verbal, tactile, kinesthetic cues/model Home Situation:  
Home Situation Home Environment: Private residence # Steps to Enter: 4 Rails to Enter: Yes Hand Rails : Bilateral 
One/Two Story Residence: One story Living Alone: Yes Support Systems: Family member(s), Child(josie) Patient Expects to be Discharged to[de-identified] Private residence(per pt's daughter pt is to live with family) Current DME Used/Available at Home: Shower chair, Grab bars Tub or Shower Type: Tub/Shower combination 
[x]  Right hand dominant   []  Left hand dominant Cognitive/Behavioral Status: 
Neurologic State: Confused; Restless Orientation Level: Oriented to person Cognition: Poor safety awareness; No command following; Impaired decision making Skin: visible skin appears intact with the exception of small tear on dorsum of L hand Edema: none noted Vision/Perceptual:   
   Acuity: Able to read clock/calendar on wall without difficulty Strength: BUE Generally decreased functional 
Tone & Sensation: BUE Normal tone Unable to screen for sensations due to limited commands following Range of Motion: BUE 
LUE WFL; RUE decreased ROM Functional Mobility and Transfers for ADLs: 
Bed Mobility: 
Rolling: Supervision Supine to Sit: Supervision(to long sit) Sit to Supine: Supervision Scooting: Supervision; Additional time ADL Assessment:  
Feeding: Setup Oral Facial Hygiene/Grooming: Supervision;Stand-by assistance Bathing: Maximum assistance Upper Body Dressing: Contact guard assistance Lower Body Dressing: Maximum assistance Toileting: Maximum assistance ADL Intervention: 
 Grooming Grooming Assistance: Supervision;Stand-by assistance Position Performed: Long sitting on bed Washing Face: Stand-by assistance Washing Hands: Supervision Upper Body Dressing Assistance Hospital Gown: Maximum assistance Lower Body Dressing Assistance Socks: Maximum assistance Pain: 
Pain level pre-treatment: none reported Pain level post-treatment: none reported Activity Tolerance:  
NA Please refer to the flowsheet for vital signs taken during this treatment. After treatment:  
[] Patient left in no apparent distress sitting up in chair 
[x] Patient left in no apparent distress in bed 
[x] Call bell left within reach [x] Nursing notified 
[] Caregiver present [x] Bed alarm activated, roll belt re-applied COMMUNICATION/EDUCATION:  
[x] Role of Occupational Therapy in the acute care setting 
[x] Home safety education was provided and the patient/caregiver indicated understanding. [] Patient/family have participated as able in goal setting and plan of care. [] Patient/family agree to work toward stated goals and plan of care. [] Patient understands intent and goals of therapy, but is neutral about his/her participation. [x] Patient is unable to participate in goal setting and plan of care. Thank you for this referral. 
Juan M Gomez OTR/L Time Calculation: 24 mins

## 2020-11-15 NOTE — PROGRESS NOTES
Effective handoff given to [place], [facility]. Nurse to assume care of patient for remainder of shift.

## 2020-11-15 NOTE — ROUTINE PROCESS
Bedside and Verbal shift change report given to Bulmaro Rios RN (oncoming nurse) by Rebel Louise RN (offgoing nurse). Report included the following information SBAR, Kardex and Recent Results.

## 2020-11-15 NOTE — ROUTINE PROCESS
Pt received in bed sleeping, pt had a fall yesterday and remains in roll belt restraints at the moment. Pt tolerated shift meds and all 3 meals well. Pt remains confused and tries to get out of bed. Safe rounding completed as per protocol, daughter visited today and stayed with pt. For about 3 hrs, pt remained calm while daughter was here. No falls noted today, incontinent care given appropriately, will continue to follow plan of care.

## 2020-11-16 NOTE — PROGRESS NOTES
Problem: Mobility Impaired (Adult and Pediatric) Goal: *Acute Goals and Plan of Care (Insert Text) Description: Physical Therapy Goals Initiated 11/1/2020, reevaluated 11/10/2020 and to be accomplished within 7 day(s) 1. Patient will move from supine to sit and sit to supine  in bed with modified independence/supervision. -GOAL MET 11/16/2020 2. Patient will transfer from bed to chair and chair to bed with modified independence/supervision using the least restrictive device. -GOAL MET 11/16/2020 3. Patient will perform sit to stand with modified independence/supervision. -GOAL MET 11/16/2020 4. Patient will ambulate with modified independence/supervision for 200 feet with the least restrictive device. -PARTIALLY MET 11/16/2020: Pt ambulated 150 feet today supervision 5. Patient will ascend/descend 4 stairs with  handrail(s) with supervision. -PARTIALLY MET 11/16/2020: marching in place for increased safety due to pt's decreased command following for stairwell PLOF: Patient reports he was independent with self care and functional mobility. He lives alone in single story home with 4-5 MARGUERITE and B HR. Outcome: Resolved/Met PHYSICAL THERAPY RE-EVALUATION/TREATMENT/DISCHARGE Patient: Osman Khan (89 y.o. male) Date: 11/16/2020 Primary Diagnosis: Atrial fibrillation with rapid ventricular response ([address] [I48.91] Procedure(s) (LRB): ENDOSCOPY w cautery w injection w bx's (N/A) 13 Days Post-Op Precautions:   Fall ASSESSMENT : 
Patient is cleared by nursing for PT reevaluation, and patient consents to therapy. Pt is supervision with all mobility today including bed mobility, transfers, and gait. Pt is following commands better today but continues to have no carryover at this time. His mobility is dependent on his mental status. Pt needed increased assist over the past week when he has increased confusion.  Pt ambulating 150 feet in hallway today no AD supervision. Pt currently has a sitter for safety. Pt ended therapy sitting in chair with all needs met. Patient does not require further skilled intervention at this level of care. PLAN : 
Recommendations and Planned Interventions: No skilled physical therapy needs identified at this time. Discharge Recommendations: LTC vs home health with supervision/assist 
Further Equipment Recommendations for Discharge: possible RW at times SUBJECTIVE:  
Patient stated I wanted some coffee (pt has coffee with lunch at end of session).  OBJECTIVE DATA SUMMARY:  
Hospital course since last seen and reason for re-evaluation: Pt has required increased and decreased assistance over the past week depending more on his confusion level. Pt is less confused today and is supervision with all mobility. He is following commands more today but over the past week most days he is not following commands. Discharging PT at this time as he has met most goals at this time and pt has decreased command following and little to no carry over with therapy. Past Medical History:  
Diagnosis Date  Ill-defined condition   
 mild memory loss with hallucinations History reviewed. No pertinent surgical history. Barriers to Learning/Limitations: yes;  altered mental status (i.e.Sedation, Confusion) Compensate with: Visual Cues, Verbal Cues, and Tactile Cues Home Situation:  
Home Situation Home Environment: Private residence # Steps to Enter: 4 Rails to Enter: Yes Hand Rails : Bilateral 
One/Two Story Residence: One story Living Alone: Yes Support Systems: Family member(s), Child(josie) Patient Expects to be Discharged to[de-identified] Private residence(per pt's daughter pt is to live with family) Current DME Used/Available at Home: Shower chair, Grab bars Tub or Shower Type: Tub/Shower combination Critical Behavior: 
Neurologic State: Confused Orientation Level: Oriented to person Cognition: Poor safety awareness Safety/Judgement: Fall prevention Psychosocial 
Patient Behaviors: Calm;Confused; Cooperative B LE Strength:   
Strength: Generally decreased, functional             
B LE Tone & Sensation:  
Tone: Normal         
Sensation: Intact B LE Range Of Motion: 
AROM: Within functional limits Posture: 
  
Posture Assessment: Forward head Functional Mobility: 
Bed Mobility: 
Rolling: Supervision Supine to Sit: Supervision Sit to Supine: Supervision Scooting: Supervision Transfers: 
Sit to Stand: Supervision Stand to Sit: Supervision Stand Pivot Transfers: Supervision Bed to Chair: Supervision Balance:  
Sitting - Static: Good (unsupported) Sitting - Dynamic: Good (unsupported) Standing: Impaired; Without support Standing - Static: Good Standing - Dynamic : Good;Fair Ambulation/Gait Training: 
Distance (ft): 150 Feet (ft) Assistive Device: (none) Ambulation - Level of Assistance: Supervision Gait Abnormalities: Decreased step clearance Speed/Lilia: Pace decreased (<100 feet/min) Step Length: Right shortened;Left shortened Stairs: 
Number of Stairs Trained: (marching in place 4 times) Stairs - Level of Assistance: Supervision Rail Use: (one hand on rail) Therapeutic Exercises:  
Reviewed and performed ankle pumps to increase blood flow and circulation. Pain: No pain noted before, during, or at end of session. Activity Tolerance:  
good Please refer to the flowsheet for vital signs taken during this treatment. After treatment:  
[x]         Patient left in no apparent distress sitting up in chair 
[]         Patient left in no apparent distress in bed 
[x]         Call bell left within reach [x]         Personal items within reach [x]         Nursing notified 
[x]         Sitter present 
[]         Bed/chair alarm activated 
[]         SCDs applied COMMUNICATION/EDUCATION:  
 [x]         Role of Physical Therapy in the acute care setting. [x]         Fall prevention education was provided and the patient/caregiver indicated understanding. [x]         Patient/family have participated as able in goal setting and plan of care. [x]         Patient/family agree to work toward stated goals and plan of care. []         Patient understands intent and goals of therapy, but is neutral about his/her participation. []         Patient is unable to participate in goal setting/plan of care: ongoing with therapy staff. [x]         Out of bed 3-5 times a day with nursing assistance. []         Other: Thank you for this referral. 
Carlos Hurd, PT, DPT Time Calculation: 13 mins

## 2020-11-16 NOTE — PROGRESS NOTES
Per rafael Baer from [facility], the physician reviewer disagrees with the patient's discharge and services should continue without interruption at this time. Tonya Calzada, MSN, RN, ACM-RN Care Management [phone]

## 2020-11-16 NOTE — PROGRESS NOTES
Bedside shift change report given to Acosta Monsivais (oncoming nurse) by Debo Serrato (offgoing nurse). Report included the following information SBAR.

## 2020-11-16 NOTE — PROGRESS NOTES
Called [facility] at [facility] [phone] which has a memory care unit. Opened referral back up in [place]. [facility] stated she would call this writer back if facility has a bed. Called [facility] [phone] and spoke to admissions who could have a bed on Friday. Opened referral back up in [place]. Will continue to follow. Alex Lala RN BSN Care Manager [phone]

## 2020-11-16 NOTE — PROGRESS NOTES
Problem: Self Care Deficits Care Plan (Adult) Goal: *Acute Goals and Plan of Care (Insert Text) Description: Occupational Therapy Goals Initiated 11/1/2020 within 7 day(s). Re-evaluated 11/7/20. Goal 1 Met, continue with all remaining goals. Re-evaluated on 11/15/20, due to lack of progress goals adjusted. Pt placed on 3-day trial 
 
1. Patient will perform functional task seated EOB following 75% commands with Supervision. 2.  Patient will perform upper body dressing with Minimal assistance and verbal cues for sequencing. 3.  Patient will perform lower body dressing with Minimal assistance and verbal cues for sequencing. 4.  Patient will perform toilet transfers with Minimal assistance using LRAD. Prior Level of Function: Pt reports living alone in private residence with family nearby. Pt reports being previously independent with I/ADLs, including driving. Pt has no DME available at home. Per conversation with pt's daughter pt has shower chair that he uses daily for bathing. Outcome: Progressing Towards Goal 
 OCCUPATIONAL THERAPY TREATMENT Patient: Jabari Fuller (57 y.o. male) Date: 11/16/2020 Diagnosis: Atrial fibrillation with rapid ventricular response ([address] [I48.91] Atrial fibrillation with rapid ventricular response ([address] Procedure(s) (LRB): ENDOSCOPY w cautery w injection w bx's (N/A) 13 Days Post-Op Precautions: Fall Chart, occupational therapy assessment, plan of care, and goals were reviewed. ASSESSMENT: 
Pt OOB seated in chair upon entry w/sitter present. Pt remains pleasantly confused, oriented to self only. Pt follows commands for LB dressing, doffing socks using bending forward technique. Pt requires Total Assist w/LB hygiene and may benefit from adaptive equipment training using long handled sponge for increase independence.  Pt requires physical assist to demonstrate tailor sit technique w/LB dressing tasks donning socks and Mod Assist. Pt tolerates standing sinkside ~ 7 minutes performing ADL grooming tasks. Pt requires vc's for sequencing oral care task. Pt returned to chair. No c/o pain, dizziness or LOB. Progression toward goals: 
[]          Improving appropriately and progressing toward goals [x]          Improving slowly and progressing toward goals 
[]          Not making progress toward goals and plan of care will be adjusted PLAN: 
Patient continues to benefit from skilled intervention to address the above impairments. Continue treatment per established plan of care. Discharge Recommendations:  Skilled Nursing Facility/LTC Further Equipment Recommendations for Discharge:  shower chair SUBJECTIVE:  
Patient stated Is that a cat up there? Lisandra Wilson OBJECTIVE DATA SUMMARY:  
Cognitive/Behavioral Status: 
Neurologic State: Alert, Confused Orientation Level: Oriented to person Cognition: Follows commands Safety/Judgement: Fall prevention Functional Mobility and Transfers for ADLs: 
 Transfers: 
Sit to Stand: Stand-by assistance Bed to Chair: Stand-by assistance Bathroom Mobility: Contact guard assistance;Stand-by assistance(w/o AD) Balance: 
Sitting: Intact Sitting - Static: Good (unsupported) Sitting - Dynamic: Good (unsupported) Standing: Impaired; Without support Standing - Static: Fair(fair plus) Standing - Dynamic : Fair(fair/fair plus) ADL Intervention: 
Grooming Position Performed: Standing Washing Face: Stand-by assistance Washing Hands: Stand-by assistance Brushing Teeth: Stand-by assistance Lower Body Bathing Lower Body : Total assistance (dependent) Position Performed: Seated in chair Lower Body Dressing Assistance Socks: Moderate assistance Leg Crossed Method Used: Yes Position Performed: Seated in chair;Bending forward method Cues: Physical assistance Cognitive Retraining Safety/Judgement: Fall prevention Pain: 
Pain level pre-treatment: 0/10 Pain level post-treatment: 0/10 Activity Tolerance:   
Good Please refer to the flowsheet for vital signs taken during this treatment. After treatment:  
[x]  Patient left in no apparent distress sitting up in chair 
[]  Patient left in no apparent distress in bed 
[x]  Call bell left within reach 
[]  Nursing notified 
[]  Caregiver present 
[]  Bed alarm activated COMMUNICATION/EDUCATION:  
[] Role of Occupational Therapy in the acute care setting 
[] Home safety education was provided and the patient/caregiver indicated understanding. [] Patient/family have participated as able in working towards goals and plan of care. [] Patient/family agree to work toward stated goals and plan of care. [] Patient understands intent and goals of therapy, but is neutral about his/her participation. [x] Patient is unable to participate in goal setting and plan of care 2/2 increase confusion. Thank you for this referral. 
TAMI Mcdonnell Time Calculation: 24 mins

## 2020-11-16 NOTE — PROGRESS NOTES
1900-Bedside and Verbal shift change report given to Layla Bustos (oncoming nurse) by [place] RN (offgoing nurse). Report included the following information SBAR, Kardex, MAR and Med Rec Status.

## 2020-11-16 NOTE — PROGRESS NOTES
Matched with all LTC facilites in [place]. Patient medicaid pending. Patient has 3 daughter who are not willing to take off work to care for patient. [employer] sent Medicaid application on 80/70/21. [employer] sent to [address] [place] [address] on 11/12/20. Manuelito Beck, RN BSN Care Manager [phone]

## 2020-11-16 NOTE — PROGRESS NOTES
Called [facility], about any beds available for LTC Medicaid pending. Francisco Javier stated patient would need a memory care unit. Francisco Javier stated she would contact [facility], [facility] and [address] and call this writer back. Mary Velarde, RN BSN Care Manager [phone]

## 2020-11-16 NOTE — PROGRESS NOTES
[address] [facility], [phone] about a bed. No beds available. Symone Acevedo [address] [facility], [phone]. [facility] does accept medicaid pending, but is not in [address] [facility]. Faxes SNF short form to Casi Back [phone] for review. María Elena from [facility] called back. [facility] does not have any beds for LTC or locked unit. So, Currently [address] are reviewing the patient. Jim Rutherford RN BSN Care Manager [phone]

## 2020-11-16 NOTE — PROGRESS NOTES
Problem: Falls - Risk of 
Goal: *Absence of Falls Description: Document Soumya Hernandes Fall Risk and appropriate interventions in the flowsheet. Outcome: Progressing Towards Goal 
Note: Fall Risk Interventions: 
Mobility Interventions: Assess mobility with egress test, Bed/chair exit alarm, Communicate number of staff needed for ambulation/transfer, Patient to call before getting OOB, PT Consult for mobility concerns, PT Consult for assist device competence, Strengthening exercises (ROM-active/passive) Mentation Interventions: Adequate sleep, hydration, pain control, Bed/chair exit alarm, Door open when patient unattended, Evaluate medications/consider consulting pharmacy, Eyeglasses and hearing aids, More frequent rounding, Reorient patient, Room close to nurse's station, Toileting rounds, Update white board, Increase mobility Medication Interventions: Bed/chair exit alarm, Evaluate medications/consider consulting pharmacy, Patient to call before getting OOB, Teach patient to arise slowly Elimination Interventions: Bed/chair exit alarm, Call light in reach, Patient to call for help with toileting needs, Elevated toilet seat, Stay With Me (per policy), Toilet paper/wipes in reach, Toileting schedule/hourly rounds, Urinal in reach History of Falls Interventions: Bed/chair exit alarm, Door open when patient unattended, Evaluate medications/consider consulting pharmacy, Room close to nurse's station, Assess for delayed presentation/identification of injury for 48 hrs (comment for end date), Utilize gait belt for transfer/ambulation

## 2020-11-16 NOTE — PROGRESS NOTES
Hospitalist Progress Note Patient: Cristhian Dillon Age: 79 y.o. : 1940 MR#: [id number] SSN: [id number] Date/Time: 2020 10:26 AM 
 
DOA: 10/29/2020 PCP: None Subjective:  
 
Patient seen and evaluated, worked with PT today and they mentioned that his gait is a little more unsteady today. He is able to feed himself. He had a fall  - daughter Mariza Worley notified by nursing staff - CT Head - NAD I spoke with Mariza Worley, his daughter Pascale Nuñez [phone]) and updated on plan of care. She mentioned that his Medicaid application had been completed and has been filed. She is waiting to hear from them sometime soon. She also mentioned that she will be here later during the day to see him. He is currently on oral antibiotics for his H.pylori. He has finished 3 days of ciprofloxacin for suspected UTI, his urine grew mix gilda. I update that he remains confused and will require LTC or lockdown unit for his safety He remains on metoprolol for his HTN and AFIB, he cannot be on anticoagulation at this time to due recent GI bleed Otherwise, she is update on [employer] decline. She is working on medicaid application. I updated that since he is no longer requiring inpatient care, he can be discharged to home or to LTC facility Chart and  note reviewed. ROS: limited but able to answer No current fever/chills, no headache, no dizziness, no facial pain No swallowing pain, No chest pain, no palpitation, no shortness of breath, no abd pain, No diarrhea, no urinary complaint, no leg pain or swelling Assessment/Plan: 1. 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 
     -No clear evidence of infection, CT head without acute event. TSH normal 
     -MRI brain without stroke 2.   Acute systolic heart failure, elevated proBNP but no evidence of volume overload With indeterminate troponin elevation on admission 3. Paroxymal atrial fibrillation with RVR, in rate control Unable to anticoagulate due to recent GI bleed 4. Hypernatremia due to lack of fluid intake, resolved 5. Acute renal insufficiency in the setting of lasix use and not maintain oral intake. Hypernatremia, due to decrease oral hydration 6. GI bleed due to Duodenal ulcer, s/p cauterize with bicap, s/p EGD Duodenal ulcer with biopsy showing H.pylori infection 7. Acute anemia due to blood loss, stable Hgb/Hct With Iron deficiency anemia 8. Alcohol positive on admission but no evidence of alcohol consumption  
      -patient confirmed that he does not drinks alcohol. 
      -negative alcohol level on repeat 9. Fall, mechanical vs syncope 10. Right upper extremity pain due to fall, resolved 11. H/o tobacco abuse 12. Right lower leg chronic wound, no infection ? PAD He is medically stable for discharge to LTC or home with 24hrs supervision I spoke with his family for plan of care. Medicaid application completed & submitted , awaiting to hear from SNF regarding acceptance. Family appealed discharge - appeal accepted. Discussed with CM - awaiting placement Continue low dose haldol BID to help with agitation and confusion. Currently, he has no infection and not likely the etiology of his confusion. Neurology concerns for dementia that was acute worsened by his recent medical illness. No further recommendation from neurology. Prognosis of his neurological recovery is poor since his symptom has been since prior to June as reported by family Avoid Benzo for now Cont Amoxicillin/clarithromycin/PP for H.pylori Cont flomax Avoid lovenox. Continue Metoprolol. Cardiology has no further invasive workup. AVoid [address] [facility] for now Can consider ASA when GI bleeding risk is low. Nutritional support Cont oral medications. Agree with PT/OT assessment, he will likely need 24hrs supervision for safety concern. Can resume lasix every other day for now Limited 1.5 liter fluid. Avoid salt. IO monitoring. Cont to advance diet. Full code Disposition planning: spoke with [facility] for placement planning Spoke with Huang Craigs at bedside and updated on clinical status and plan of care. Will follow Additional Notes:   
Time spent >35 minutes Case discussed with:  [x]Patient  [x]Family  [x]Nursing  [x]Case Management DVT Prophylaxis:  []Lovenox  []Hep SQ  [x]SCDs  []Coumadin   []On Heparin gtt Signed By: Aminta Lindsey MD   
 2020 Objective:  
VS:  
Visit Vitals /79 (BP 1 Location: Left arm, BP Patient Position: Sitting) Pulse (!) 104 Temp 97.5 °F (36.4 °C) Resp 19 Ht 5' 9\" (1.753 m) Wt 71.2 kg (157 lb) SpO2 98% BMI 23.18 kg/m² Tmax/24hrs: Temp (24hrs), Av.6 °F (36.4 °C), Min:97.3 °F (36.3 °C), Max:98 °F (36.7 °C) Intake/Output Summary (Last 24 hours) at 2020 1357 Last data filed at 11/15/2020 2143 Gross per 24 hour Intake 237 ml Output  Net 237 ml Tele: afib General:  Cooperative, Not in acute distress, HEENT: PERRL, EOMI, supple neck, no JVD, dry oral mucosa Cardiovascular: S1S2 regular, no rub/gallop Pulmonary: air entry bilaterally, no wheezing, + crackle GI:  Soft, non tender, non distended, +bs, no guarding Extremities:  No pedal edema, +distal pulses appreciated Chronic right leg wound Neuro: AOx2, moving all extremities Additional:  
 
 
Current Facility-Administered Medications Medication Dose Route Frequency  melatonin tablet 6 mg  6 mg Oral QHS  haloperidoL (HALDOL) tablet 5 mg  5 mg Oral BID  influenza vaccine - (6 mos+)(PF) (FLUARIX/FLULAVAL/FLUZONE QUAD) injection 0.5 mL  0.5 mL IntraMUSCular PRIOR TO DISCHARGE  amoxicillin (AMOXIL) capsule 1,000 mg  1,000 mg Oral Q12H  clarithromycin (BIAXIN) 250 mg/5 mL oral suspension 500 mg  500 mg Oral Q12H  pantoprazole (PROTONIX) tablet 40 mg  40 mg Oral BID  thiamine HCL (B-1) tablet 100 mg  100 mg Oral DAILY  sucralfate (CARAFATE) tablet 1 g  1 g Oral AC&HS  
 metoprolol tartrate (LOPRESSOR) tablet 50 mg  50 mg Oral Q12H  
 metoprolol (LOPRESSOR) injection 5 mg  5 mg IntraVENous Q4H PRN  
 sodium chloride (NS) flush 5-40 mL  5-40 mL IntraVENous Q8H  
 sodium chloride (NS) flush 5-40 mL  5-40 mL IntraVENous PRN  
 furosemide (LASIX) tablet 20 mg  20 mg Oral DAILY  hydrALAZINE (APRESOLINE) 20 mg/mL injection 10 mg  10 mg IntraVENous Q6H PRN  
 [Held by provider] aspirin chewable tablet 81 mg  81 mg Oral DAILY  therapeutic multivitamin (THERAGRAN) tablet 1 Tab  1 Tab Oral DAILY  folic acid (FOLVITE) tablet 1 mg  1 mg Oral DAILY  acetaminophen (TYLENOL) tablet 650 mg  650 mg Oral Q6H PRN Or  
 acetaminophen (TYLENOL) suppository 650 mg  650 mg Rectal Q6H PRN  polyethylene glycol (MIRALAX) packet 17 g  17 g Oral DAILY PRN  promethazine (PHENERGAN) tablet 12.5 mg  12.5 mg Oral Q6H PRN Or  
 ondansetron (ZOFRAN) injection 4 mg  4 mg IntraVENous Q6H PRN Lab/Data Review: 
Labs: Results:  
   
Chemistry No results for input(s): GLU, NA, K, CL, CO2, BUN, CREA, BUCR, AGAP, CA, PHOS in the last 72 hours. No results for input(s): TBIL, ALT, ALKP, TP, ALB, GLOB, AGRAT in the last 72 hours. No lab exists for component: SGOT  
CBC w/Diff No results for input(s): WBC, RBC, HGB, HCT, MCV, MCH, MCHC, RDW, PLT, BANDS, GRANS, LYMPH, EOS, HGBEXT, HCTEXT, PLTEXT, HGBEXT, HCTEXT, PLTEXT in the last 72 hours. Coagulation No results for input(s): PTP, INR, APTT, INREXT, INREXT in the last 72 hours. Iron/Ferritin Lab Results Component Value Date/Time Iron 48 (L) 11/03/2020 12:55 AM  
 TIBC 232 (L) 11/03/2020 12:55 AM  
 Iron % saturation 21 11/03/2020 12:55 AM  
 Ferritin 63 11/03/2020 12:55 AM  
   
BNP Cardiac Enzymes Lab Results Component Value Date/Time CK 83 10/30/2020 08:14 AM  
 CK - MB 1.2 10/30/2020 08:14 AM  
 CK-MB Index 1.4 10/30/2020 08:14 AM  
 Troponin-I, QT 0.04 10/30/2020 08:14 AM  
 
  
Lactic Acid Thyroid Studies All Micro Results Procedure Component Value Units Date/Time CULTURE, URINE [[id number]] Collected:  11/05/20 1900 Order Status:  Completed Specimen:  Cath Urine Updated:  11/07/20 1039 Special Requests: NO SPECIAL REQUESTS [place] Count --     
  [id number] COLONIES/mL Culture result:    
  MIXED UROGENITAL ENMANUEL ISOLATED Images: 
 
CT (Most Recent). CT Results (most recent): 
Results from Hospital Encounter encounter on 10/29/20 CT HEAD WO CONT Narrative CT OF THE BRAIN 
 
COMPARISON: None. INDICATIONS: Fall and memory loss. TECHNIQUE: Volumetric data acquisition was performed   through the brain on a 
multislice scanner and reconstructed in the axial plane. FINDINGS:   
 
The ventricles and CSF spaces are enlarged consistent with age associated 
atrophy. There is no midline shift. Jose Carlos Brinks The brain parenchyma is of generally normal attenuation. There is decreased 
attenuation in the periventricular white matter consistent with presumed 
microvascular disease. There is no hemorrhage evident. There are no pathologic fluid collections. There are no pathologic calcifications. . 
. The visible portions of the paranasal sinuses: Are clear. Impression IMPRESSION:  
 
Atrophy and microvascular disease. No acute intracranial process. All CT scans at this facility are performed using dose optimization technique as 
appropriate to the performed exam, to include automated exposure control, 
adjustment of the mA and/or kV according to patient's size (Including appropriate matching for site-specific examinations), or use of iterative 
reconstruction technique. MRI Results (most recent): 
Results from Hospital Encounter encounter on 10/29/20 MRI BRAIN WO CONT Narrative Brain MR without contrast 
 
HISTORY: Confusion, memory loss, hallucinations and falls. COMPARISON: CT 10/29/2020 TECHNIQUE: Brain scanned with sagittal and axial T1W scans, axial T2W , axial 
FLAIR, axial diffusion weighted images and SWAN. FINDINGS: Details are limited by mild-to-moderate motion artifact. Cerebral parenchyma: No restricted diffusion abnormalities to suggest acute 
stroke. No evidence of edema, mass effect or mass lesion. No significant T2 or FLAIR hyperintense abnormalities. Prominent symmetric susceptibility hypointense 
signal in the basal ganglia. Brain volumes and ventricular system: Normal in size and morphology for the 
patient's age. Midline structures: Normal. 
 
Cerebellum: Normal. 
 
Brainstem: Normal. 
 
Vascular system: Expected arterial flow voids are present at the base of brain. Calvarium and skull base: Normal. 
 
Paranasal sinuses and mastoid air cells: Essentially clear. Couple of tiny mucus 
retention cysts in the maxillary sinuses. Visualized orbits: Unremarkable for a nondedicated exam. 
 
Visualized upper cervical spine: Unremarkable for a nondedicated exam. 
  
 Impression IMPRESSION: 
 
1. No acute brain abnormalities. 2.  Symmetric prominent susceptibility artifact in the basal ganglia suggesting 
increased iron/mineral deposition. XRAY (Most Recent) EKG No results found for this or any previous visit. 2D ECHO 10/29/20 ECHO ADULT COMPLETE 10/31/2020 10/31/2020 Narrative · LV: Estimated LVEF is 40 - 45%. Visually measured ejection fraction. Normal cavity size. Mild concentric hypertrophy. · RV: Mildly dilated right ventricle. Mildly reduced systolic function. · PA: Mild pulmonary hypertension. Pulmonary arterial systolic pressure is 46 mmHg. · IVC: Moderately elevated central venous pressure (10-15 mmHg); IVC  
diameter is larger than 21 mm and collapses more than 50% with  
respiration.  
   
  Signed by: Elissa Randall MD

## 2020-11-17 NOTE — ROUTINE PROCESS
Bedside shift change report given to Kay Ardon RN (oncoming nurse) by Taisha Molina RN (offgoing nurse). Report included the following information SBAR, Kardex, MAR and Cardiac Rhythm Afib.

## 2020-11-17 NOTE — PROGRESS NOTES
Currently [address] are reviewing the patient Chandan Brewster at [facility] [phone] and left a message to call this writer back to check on bed availability. Charlette Buenrostro at [facility] [phone] to check on receipt of Fax of SNF short form. Tricia Bermudez requested to fax to his efax at [phone]. SNF short form faxed to efax number. Will follow up with both facilites.

## 2020-11-17 NOTE — PROGRESS NOTES
Hospitalist Progress Note Patient: Jabari Fuller Age: 79 y.o. : 1940 MR#: [id number] SSN: [id number] Date/Time: 2020 10:26 AM 
 
DOA: 10/29/2020 PCP: None Subjective:  
 
Patient seen and evaluated, No new changes He is able to feed himself. He had a fall  - daughter Abdon Gallegos notified by nursing staff - CT Head - NAD I spoke with Abdon Gallegos, his daughter Rafia Grace [phone]) and updated on plan of care. She mentioned that his Medicaid application had been completed and has been filed. She is waiting to hear from them sometime soon. She also mentioned that she will be here later during the day to see him. He is currently on oral antibiotics for his H.pylori. He has finished 3 days of ciprofloxacin for suspected UTI, his urine grew mix gilda. I update that he remains confused and will require LTC or lockdown unit for his safety He remains on metoprolol for his HTN and AFIB, he cannot be on anticoagulation at this time to due recent GI bleed Otherwise, she is update on [employer] decline. She is working on medicaid application. I updated that since he is no longer requiring inpatient care, he can be discharged to home or to LTC facility Chart and  note reviewed. ROS: limited but able to answer No current fever/chills, no headache, no dizziness, no facial pain No swallowing pain, No chest pain, no palpitation, no shortness of breath, no abd pain, No diarrhea, no urinary complaint, no leg pain or swelling Assessment/Plan: 1. 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 
     -No clear evidence of infection, CT head without acute event. TSH normal 
     -MRI brain without stroke 2. Acute systolic heart failure, elevated proBNP but no evidence of volume overload With indeterminate troponin elevation on admission 3. Paroxymal atrial fibrillation with RVR, in rate control Unable to anticoagulate due to recent GI bleed 4. Hypernatremia due to lack of fluid intake, resolved 5. Acute renal insufficiency in the setting of lasix use and not maintain oral intake. Hypernatremia, due to decrease oral hydration 6. GI bleed due to Duodenal ulcer, s/p cauterize with bicap, s/p EGD Duodenal ulcer with biopsy showing H.pylori infection 7. Acute anemia due to blood loss, stable Hgb/Hct With Iron deficiency anemia 8. Alcohol positive on admission but no evidence of alcohol consumption  
      -patient confirmed that he does not drinks alcohol. 
      -negative alcohol level on repeat 9. Fall, mechanical vs syncope 10. Right upper extremity pain due to fall, resolved 11. H/o tobacco abuse 12. Right lower leg chronic wound, no infection ? PAD He is medically stable for discharge to LTC or home with 24hrs supervision I spoke with his family for plan of care. Medicaid application completed & submitted , awaiting to hear from SNF regarding acceptance. Family appealed discharge - appeal accepted. Discussed with CM - awaiting placement Continue low dose haldol BID to help with agitation and confusion. Currently, he has no infection and not likely the etiology of his confusion. Neurology concerns for dementia that was acute worsened by his recent medical illness. No further recommendation from neurology. Prognosis of his neurological recovery is poor since his symptom has been since prior to June as reported by family Avoid Benzo for now Cont Amoxicillin/clarithromycin/PP for H.pylori Cont flomax Avoid lovenox. Continue Metoprolol. Cardiology has no further invasive workup. [address]oid [address] for now Can consider ASA when GI bleeding risk is low. Nutritional support Cont oral medications. Agree with PT/OT assessment, he will likely need 24hrs supervision for safety concern. Can resume lasix every other day for now Limited 1.5 liter fluid. Avoid salt. IO monitoring. Cont to advance diet. Full code Disposition planning: spoke with [facility] for placement planning Spoke with Edgar Menon at bedside and updated on clinical status and plan of care. Will follow Additional Notes:   
Time spent >35 minutes Case discussed with:  [x]Patient  [x]Family  [x]Nursing  [x]Case Management DVT Prophylaxis:  []Lovenox  []Hep SQ  [x]SCDs  []Coumadin   []On Heparin gtt Signed By: Afton Nyhan, MD   
 2020 Objective:  
VS:  
Visit Vitals /74 (BP 1 Location: Left arm, BP Patient Position: At rest) Pulse (!) 106 Temp 97.6 °F (36.4 °C) Resp 18 Ht 5' 9\" (1.753 m) Wt 71.2 kg (156 lb 15.5 oz) SpO2 95% BMI 23.18 kg/m² Tmax/24hrs: Temp (24hrs), Av.7 °F (36.5 °C), Min:97.5 °F (36.4 °C), Max:98.1 °F (36.7 °C) Intake/Output Summary (Last 24 hours) at 2020 1056 Last data filed at 2020 1925 Gross per 24 hour Intake 240 ml Output 200 ml Net 40 ml Tele: afib General:  Cooperative, Not in acute distress, HEENT: PERRL, EOMI, supple neck, no JVD, dry oral mucosa Cardiovascular: S1S2 regular, no rub/gallop Pulmonary: air entry bilaterally, no wheezing, + crackle GI:  Soft, non tender, non distended, +bs, no guarding Extremities:  No pedal edema, +distal pulses appreciated Chronic right leg wound Neuro: AOx2, moving all extremities Additional:  
 
 
Current Facility-Administered Medications Medication Dose Route Frequency  melatonin tablet 6 mg  6 mg Oral QHS  haloperidoL (HALDOL) tablet 5 mg  5 mg Oral BID  influenza vaccine  (6 mos+)(PF) (FLUARIX/FLULAVAL/FLUZONE QUAD) injection 0.5 mL  0.5 mL IntraMUSCular PRIOR TO DISCHARGE  amoxicillin (AMOXIL) capsule 1,000 mg  1,000 mg Oral Q12H  clarithromycin (BIAXIN) 250 mg/5 mL oral suspension 500 mg  500 mg Oral Q12H  pantoprazole (PROTONIX) tablet 40 mg  40 mg Oral BID  thiamine HCL (B-1) tablet 100 mg  100 mg Oral DAILY  sucralfate (CARAFATE) tablet 1 g  1 g Oral AC&HS  
 metoprolol tartrate (LOPRESSOR) tablet 50 mg  50 mg Oral Q12H  
 metoprolol (LOPRESSOR) injection 5 mg  5 mg IntraVENous Q4H PRN  
 sodium chloride (NS) flush 5-40 mL  5-40 mL IntraVENous Q8H  
 sodium chloride (NS) flush 5-40 mL  5-40 mL IntraVENous PRN  
 furosemide (LASIX) tablet 20 mg  20 mg Oral DAILY  hydrALAZINE (APRESOLINE) 20 mg/mL injection 10 mg  10 mg IntraVENous Q6H PRN  
 [Held by provider] aspirin chewable tablet 81 mg  81 mg Oral DAILY  therapeutic multivitamin (THERAGRAN) tablet 1 Tab  1 Tab Oral DAILY  folic acid (FOLVITE) tablet 1 mg  1 mg Oral DAILY  acetaminophen (TYLENOL) tablet 650 mg  650 mg Oral Q6H PRN Or  
 acetaminophen (TYLENOL) suppository 650 mg  650 mg Rectal Q6H PRN  polyethylene glycol (MIRALAX) packet 17 g  17 g Oral DAILY PRN  promethazine (PHENERGAN) tablet 12.5 mg  12.5 mg Oral Q6H PRN Or  
 ondansetron (ZOFRAN) injection 4 mg  4 mg IntraVENous Q6H PRN Lab/Data Review: 
Labs: Results:  
   
Chemistry No results for input(s): GLU, NA, K, CL, CO2, BUN, CREA, BUCR, AGAP, CA, PHOS in the last 72 hours. No results for input(s): TBIL, ALT, ALKP, TP, ALB, GLOB, AGRAT in the last 72 hours. No lab exists for component: SGOT  
CBC w/Diff No results for input(s): WBC, RBC, HGB, HCT, MCV, MCH, MCHC, RDW, PLT, BANDS, GRANS, LYMPH, EOS, HGBEXT, HCTEXT, PLTEXT, HGBEXT, HCTEXT, PLTEXT in the last 72 hours. Coagulation No results for input(s): PTP, INR, APTT, INREXT, INREXT in the last 72 hours. Iron/Ferritin Lab Results Component Value Date/Time  Iron 48 (L) 11/03/2020 12:55 AM  
 TIBC 232 (L) 11/03/2020 12:55 AM  
 Iron % saturation 21 11/03/2020 12:55 AM  
 Ferritin 63 11/03/2020 12:55 AM  
   
 BNP   
Cardiac Enzymes Lab Results Component Value Date/Time CK 83 10/30/2020 08:14 AM  
 CK - MB 1.2 10/30/2020 08:14 AM  
 CK-MB Index 1.4 10/30/2020 08:14 AM  
 Troponin-I, QT 0.04 10/30/2020 08:14 AM  
 
  
Lactic Acid Thyroid Studies All Micro Results Procedure Component Value Units Date/Time CULTURE, URINE [[id number]] Collected:  11/05/20 1900 Order Status:  Completed Specimen:  Cath Urine Updated:  11/07/20 1039 Special Requests: NO SPECIAL REQUESTS [place] Count --     
  [id number] COLONIES/mL Culture result:    
  MIXED UROGENITAL ENMANUEL ISOLATED Images: 
 
CT (Most Recent). CT Results (most recent): 
Results from Hospital Encounter encounter on 10/29/20 CT HEAD WO CONT Narrative CT OF THE BRAIN 
 
COMPARISON: None. INDICATIONS: Fall and memory loss. TECHNIQUE: Volumetric data acquisition was performed   through the brain on a 
multislice scanner and reconstructed in the axial plane. FINDINGS:   
 
The ventricles and CSF spaces are enlarged consistent with age associated 
atrophy. There is no midline shift. Aggie Sleet The brain parenchyma is of generally normal attenuation. There is decreased 
attenuation in the periventricular white matter consistent with presumed 
microvascular disease. There is no hemorrhage evident. There are no pathologic fluid collections. There are no pathologic calcifications. . 
. The visible portions of the paranasal sinuses: Are clear. Impression IMPRESSION:  
 
Atrophy and microvascular disease. No acute intracranial process. All CT scans at this facility are performed using dose optimization technique as 
appropriate to the performed exam, to include automated exposure control, 
adjustment of the mA and/or kV according to patient's size (Including 
appropriate matching for site-specific examinations), or use of iterative 
reconstruction technique.    
 
 
MRI Results (most recent): 
 Results from Hospital Encounter encounter on 10/29/20 MRI BRAIN WO CONT Narrative Brain MR without contrast 
 
HISTORY: Confusion, memory loss, hallucinations and falls. COMPARISON: CT 10/29/2020 TECHNIQUE: Brain scanned with sagittal and axial T1W scans, axial T2W , axial 
FLAIR, axial diffusion weighted images and SWAN. FINDINGS: Details are limited by mild-to-moderate motion artifact. Cerebral parenchyma: No restricted diffusion abnormalities to suggest acute 
stroke. No evidence of edema, mass effect or mass lesion. No significant T2 or FLAIR hyperintense abnormalities. Prominent symmetric susceptibility hypointense 
signal in the basal ganglia. Brain volumes and ventricular system: Normal in size and morphology for the 
patient's age. Midline structures: Normal. 
 
Cerebellum: Normal. 
 
Brainstem: Normal. 
 
Vascular system: Expected arterial flow voids are present at the base of brain. Calvarium and skull base: Normal. 
 
Paranasal sinuses and mastoid air cells: Essentially clear. Couple of tiny mucus 
retention cysts in the maxillary sinuses. Visualized orbits: Unremarkable for a nondedicated exam. 
 
Visualized upper cervical spine: Unremarkable for a nondedicated exam. 
  
 Impression IMPRESSION: 
 
1. No acute brain abnormalities. 2.  Symmetric prominent susceptibility artifact in the basal ganglia suggesting 
increased iron/mineral deposition. XRAY (Most Recent) EKG No results found for this or any previous visit. 2D ECHO 10/29/20 ECHO ADULT COMPLETE 10/31/2020 10/31/2020 Narrative · LV: Estimated LVEF is 40 - 45%. Visually measured ejection fraction. Normal cavity size. Mild concentric hypertrophy. · RV: Mildly dilated right ventricle. Mildly reduced systolic function. · PA: Mild pulmonary hypertension. Pulmonary arterial systolic pressure is 46 mmHg. · IVC: Moderately elevated central venous pressure (10-15 mmHg);  IVC  
 diameter is larger than 21 mm and collapses more than 50% with  
respiration.  
   
  Signed by: Allison Rhodes MD

## 2020-11-17 NOTE — PROGRESS NOTES
1925: Assumed patient care from [facility]. Patient is alert and oriented to person,but disoriented to place, time and situation. Respiratory status is stable on room air. Vital signs are stable. MEWS score is a one. Patient denies any pain, discomfort, nausea vomiting dizziness or anxiety. White board and fall card is updated. Bed is locked and in lowest position. Call bell, water and personal belongings are within reach. Patient has no questions, comments or concerns after bedside shift report. 0700: Patient had an uneventful shift. Respiratory status, vital signs and MEWS score remained stable. Patient was resting quietly with no signs of distress noted. Bed locked and in lowest position. Call bell water and personal belongings were within reach. Patient had no questions, comments or concerns after bedside shift report.  Bedside report given to [facility].

## 2020-11-17 NOTE — ROUTINE PROCESS
Bedside shift change report given to Joaquin Fam RN (oncoming nurse) by ΣΑΡΑLU NUR (offgoing nurse).  Report included the following information SBAR, Kardex, MAR and Cardiac Rhythm NSR, SB.

## 2020-11-17 NOTE — PROGRESS NOTES
Received call back from CHRIS at [facility]. CHRIS stated the facility does NOT take Medicaid pending. Called Kale at [facility] again. Left message to call this writer back to check on bed availability. [facility] website currently down per [facility] [place] gill Esquivel. Will check [facility] again once website is up an running for any further leads on a bed. Alex Lala RN BSN Care Manager [phone]\

## 2020-11-18 NOTE — PROGRESS NOTES
Problem: Self Care Deficits Care Plan (Adult) Goal: *Acute Goals and Plan of Care (Insert Text) Description: Occupational Therapy Goals Initiated 11/1/2020 within 7 day(s). Re-evaluated 11/7/20. Goal 1 Met, continue with all remaining goals. Re-evaluated on 11/15/20, due to lack of progress goals adjusted. Pt placed on 3-day trial 
 
1. Patient will perform functional task seated EOB following 75% commands with Supervision. 2.  Patient will perform upper body dressing with Minimal assistance and verbal cues for sequencing. 3.  Patient will perform lower body dressing with Minimal assistance and verbal cues for sequencing. 4.  Patient will perform toilet transfers with Minimal assistance using LRAD. Prior Level of Function: Pt reports living alone in private residence with family nearby. Pt reports being previously independent with I/ADLs, including driving. Pt has no DME available at home. Per conversation with pt's daughter pt has shower chair that he uses daily for bathing. Outcome: Progressing Towards Goal 
 OCCUPATIONAL THERAPY TREATMENT/DISCHARGE Patient: Sirena Montanez (37 y.o. male) Date: 11/18/2020 Diagnosis: Atrial fibrillation with rapid ventricular response ([address] [I48.91] Atrial fibrillation with rapid ventricular response ([address] Procedure(s) (LRB): ENDOSCOPY w cautery w injection w bx's (N/A) 15 Days Post-Op Precautions: Fall Chart, occupational therapy assessment, plan of care, and goals were reviewed. ASSESSMENT: 
Pt OOB seated in chair upon entry. Pt pleasantly confused, oriented to self only. Pt demonstrates energy conservation techniques and improved LB dressing independence donning socks w/supervision. Pt performs ADL grooming tasks at chair level and standing sinkside. Pt requires assist w/complex haircare 2/2 decrease ROM BUE. Pt tolerates standing sinkside ~ 5 minutes. No c/o pain, no LOB.  Pt has reached plateau 2/2 cognitive status and will require 24 hr supervision for increase safety and minimize risk of hospital readmission. PLAN: 
Patient will be discharged from occupational therapy at this time. Rationale for discharge: 
[x] Goals Achieved [x] Pari Garza 
[] Patient not participating in therapy 
[] Other: 
Discharge Recommendations:  [address] Aguilar w/24 hr supervision Further Equipment Recommendations for Discharge:  shower chair SUBJECTIVE:  
Patient stated I'll follow you.  OBJECTIVE DATA SUMMARY:  
Cognitive/Behavioral Status: 
Neurologic State: Alert, Confused Orientation Level: Oriented to person Cognition: Follows commands Safety/Judgement: Fall prevention Functional Mobility and Transfers for ADLs: 
 Transfers: 
Sit to Stand: Independent Bed to Chair: Supervision Bathroom Mobility: Supervision/set up Balance: 
Sitting: Intact Standing: Intact; Without support Standing - Static: Fair(fair plus) Standing - Dynamic : Fair(fair plus) ADL Intervention: 
Grooming Washing Face: Supervision Washing Hands: Supervision Brushing/Combing Hair: Minimum assistance(complex hair care w/shampoo cap) UE Therapeutic Exercises: AROM BUE elbow flexion/extension AROM BUE shoulder flexion ~ 90 degrees Pain: 
Pain level pre-treatment: 0/10 Pain level post-treatment: 0/10 Activity Tolerance:   
Good Please refer to the flowsheet for vital signs taken during this treatment. After treatment:  
[x]  Patient left in no apparent distress sitting up in chair 
[]  Patient left in no apparent distress in bed 
[]  Call bell left within reach [x]  Nursing notified 
[]  Caregiver present 
[]  Bed alarm activated COMMUNICATION/EDUCATION:  
[]      Role of Occupational Therapy in the acute care setting 
[]      Home safety education was provided and the patient/caregiver indicated understanding. []      Patient/family have participated as able and agree with findings and recommendations. [x]      Patient is unable to participate in plan of care at this time 2/2 cognitive deficits. Thank you for allowing me to assist in the care of this patient. Mildred Rizvi Time Calculation: 25 mins

## 2020-11-18 NOTE — PROGRESS NOTES
Spoke with [employer] worker, Ms. Jos Ryan [phone], who received medicaid application on 48/31/27 and is reviewing now. Explained urgency of needing medicaid to place pt. Ms. Jos Ryan indicated she was willing to try and expedite application as long as she has all documents and would explore that today. Also spoke with [employer] and has a copy of all documents sent to [facility]. Ms. Jos Ryan indicated she would call this CM back today with update. Ms Jos Ryan also provided her supervisor's number Gayla Howard [phone]. Ms. Jos Ryan will reach out to her supervisor today to try and obtain all documents sent with application. Left message for Ms. Trimble Fide [phone], group home owner, to call this CM back to discuss possible placement. Left message for [facility] [phone], group home owner, to call this CM back to discuss possible placement 1500: Received return call from Ms. Carly Mittal who will call this CM back if able to accept. Ms Carly Mittal is aware that pt would need 24 hour supervision. Jonathan Taylor, MSW, [facility]- [phone]

## 2020-11-18 NOTE — PROGRESS NOTES
Hospitalist Progress Note Patient: Shaun Lab Age: 79 y.o. : 1940 MR#: [id number] SSN: [id number] Date/Time: 2020 10:26 AM 
 
DOA: 10/29/2020 PCP: None Subjective:  
 
Patient is sitting in a chair in no apparent distress, awake, follows simple commands. Sitter at bedside Assessment/Plan: 1. Acute encephalopathy with visual hallucination, unclear etiology, Improved to new baseline 
     -Suspected vascular dementia 2. Acute systolic heart failure, With indeterminate troponin elevation on admission 3. Paroxymal atrial fibrillation with RVR, rate is controlled now Unable to anticoagulate due to recent GI bleed 4. Hypernatremia due to lack of fluid intake, improved 5. Acute renal insufficiency in the setting of lasix use and not maintain oral intake. Hypernatremia, due to decrease oral hydration 6. GI bleed due to Duodenal ulcer, s/p cauterize with bicap, s/p EGD Duodenal ulcer with biopsy showing H.pylori infection 7. Acute anemia due to blood loss, stable Hgb/Hct With Iron deficiency anemia 9. History of fall 11. H/o tobacco abuse 12. Right lower leg chronic wound Continue amoxicillin and clarithromycin and proton pump inhibitor for H. pylori infection. Continue p.o. Lasix Continue beta-blocker, not on anticoagulation secondary to GI bleed Continue PT OT Continue wound care On Haldol twice daily We will consult psychiatry PT OT are recommending home with 24/7 supervision. I discussed with patient's daughter Néstor Loredo over the phone and she states that family is not able to provide 24/7 care for the patient. Case management is involved Patient is a full code Case discussed with:  [x]Patient  [x]Family  [x]Nursing  [x]Case Management DVT Prophylaxis:  []Lovenox  []Hep SQ  [x]SCDs  []Coumadin   []On Heparin gtt Signed By: Jaspal Lujan MD   
 2020 Objective: VS:  
Visit Vitals /74 (BP 1 Location: Left arm, BP Patient Position: At rest) Pulse 98 Temp 97.2 °F (36.2 °C) Resp 20 Ht 5' 9\" (1.753 m) Wt 71.2 kg (156 lb 15.5 oz) SpO2 96% BMI 23.18 kg/m² Tmax/24hrs: Temp (24hrs), Av °F (36.7 °C), Min:97.2 °F (36.2 °C), Max:98.8 °F (37.1 °C) Intake/Output Summary (Last 24 hours) at 2020 1432 Last data filed at 2020 9802 Gross per 24 hour Intake 360 ml Output  Net 360 ml General:  Awake, confused, follows simple commands Cardiovascular:  S1S2+, irregular Pulmonary:  CTA b/l GI:  Soft, BS+, NT, ND Extremities:  No edema Current Facility-Administered Medications Medication Dose Route Frequency  melatonin tablet 6 mg  6 mg Oral QHS  haloperidoL (HALDOL) tablet 5 mg  5 mg Oral BID  influenza vaccine  (6 mos+)(PF) (FLUARIX/FLULAVAL/FLUZONE QUAD) injection 0.5 mL  0.5 mL IntraMUSCular PRIOR TO DISCHARGE  amoxicillin (AMOXIL) capsule 1,000 mg  1,000 mg Oral Q12H  clarithromycin (BIAXIN) 250 mg/5 mL oral suspension 500 mg  500 mg Oral Q12H  pantoprazole (PROTONIX) tablet 40 mg  40 mg Oral BID  thiamine HCL (B-1) tablet 100 mg  100 mg Oral DAILY  sucralfate (CARAFATE) tablet 1 g  1 g Oral AC&HS  
 metoprolol tartrate (LOPRESSOR) tablet 50 mg  50 mg Oral Q12H  
 metoprolol (LOPRESSOR) injection 5 mg  5 mg IntraVENous Q4H PRN  
 sodium chloride (NS) flush 5-40 mL  5-40 mL IntraVENous Q8H  
 sodium chloride (NS) flush 5-40 mL  5-40 mL IntraVENous PRN  
 furosemide (LASIX) tablet 20 mg  20 mg Oral DAILY  hydrALAZINE (APRESOLINE) 20 mg/mL injection 10 mg  10 mg IntraVENous Q6H PRN  
 aspirin chewable tablet 81 mg  81 mg Oral DAILY  therapeutic multivitamin (THERAGRAN) tablet 1 Tab  1 Tab Oral DAILY  folic acid (FOLVITE) tablet 1 mg  1 mg Oral DAILY  acetaminophen (TYLENOL) tablet 650 mg  650 mg Oral Q6H PRN  Or  
  acetaminophen (TYLENOL) suppository 650 mg  650 mg Rectal Q6H PRN  polyethylene glycol (MIRALAX) packet 17 g  17 g Oral DAILY PRN  promethazine (PHENERGAN) tablet 12.5 mg  12.5 mg Oral Q6H PRN Or  
 ondansetron (ZOFRAN) injection 4 mg  4 mg IntraVENous Q6H PRN Lab/Data Review: 
Labs: Results:  
   
Chemistry Recent Labs 11/17/20 
1525 GLU 94 * K 3.7  CO2 28 BUN 24* CREA 1.26  
BUCR 19 AGAP 8  
CA 8.2* Recent Labs 11/17/20 
1525 ALT 16  
TP 6.2* ALB 2.9*  
GLOB 3.3 AGRAT 0.9  
  
CBC w/Diff Recent Labs 11/17/20 
1525 WBC 7.5  
RBC 3.27* HGB 9.7* HCT 32.4* MCV 99.1*  
MCH 29.7 MCHC 29.9*  
RDW 15.0*  
 GRANS 61 LYMPH 27 EOS 2 Coagulation No results for input(s): PTP, INR, APTT, INREXT, INREXT in the last 72 hours. Iron/Ferritin Lab Results Component Value Date/Time Iron 48 (L) 11/03/2020 12:55 AM  
 TIBC 232 (L) 11/03/2020 12:55 AM  
 Iron % saturation 21 11/03/2020 12:55 AM  
 Ferritin 63 11/03/2020 12:55 AM  
   
BNP Cardiac Enzymes Lab Results Component Value Date/Time CK 83 10/30/2020 08:14 AM  
 CK - MB 1.2 10/30/2020 08:14 AM  
 CK-MB Index 1.4 10/30/2020 08:14 AM  
 Troponin-I, QT 0.04 10/30/2020 08:14 AM  
 
  
Lactic Acid Thyroid Studies All Micro Results Procedure Component Value Units Date/Time CULTURE, URINE [[id number]] Collected:  11/05/20 1900 Order Status:  Completed Specimen:  Cath Urine Updated:  11/07/20 1039 Special Requests: NO SPECIAL REQUESTS [place] Count --     
  10186 COLONIES/mL Culture result:    
  MIXED UROGENITAL ENMAUNEL ISOLATED Images: 
 
CT (Most Recent). CT Results (most recent): 
Results from Hospital Encounter encounter on 10/29/20 CT HEAD WO CONT Narrative CT OF THE BRAIN 
 
COMPARISON: None. INDICATIONS: Fall and memory loss.  
 
TECHNIQUE: Volumetric data acquisition was performed   through the brain on a 
multislice scanner and reconstructed in the axial plane. FINDINGS:   
 
The ventricles and CSF spaces are enlarged consistent with age associated 
atrophy. There is no midline shift. Philbert Hilario The brain parenchyma is of generally normal attenuation. There is decreased 
attenuation in the periventricular white matter consistent with presumed 
microvascular disease. There is no hemorrhage evident. There are no pathologic fluid collections. There are no pathologic calcifications. . 
. The visible portions of the paranasal sinuses: Are clear. Impression IMPRESSION:  
 
Atrophy and microvascular disease. No acute intracranial process. All CT scans at this facility are performed using dose optimization technique as 
appropriate to the performed exam, to include automated exposure control, 
adjustment of the mA and/or kV according to patient's size (Including 
appropriate matching for site-specific examinations), or use of iterative 
reconstruction technique. MRI Results (most recent): 
Results from Hospital Encounter encounter on 10/29/20 MRI BRAIN WO CONT Narrative Brain MR without contrast 
 
HISTORY: Confusion, memory loss, hallucinations and falls. COMPARISON: CT 10/29/2020 TECHNIQUE: Brain scanned with sagittal and axial T1W scans, axial T2W , axial 
FLAIR, axial diffusion weighted images and SWAN. FINDINGS: Details are limited by mild-to-moderate motion artifact. Cerebral parenchyma: No restricted diffusion abnormalities to suggest acute 
stroke. No evidence of edema, mass effect or mass lesion. No significant T2 or FLAIR hyperintense abnormalities. Prominent symmetric susceptibility hypointense 
signal in the basal ganglia. Brain volumes and ventricular system: Normal in size and morphology for the 
patient's age.  
 
Midline structures: Normal. 
 
Cerebellum: Normal. 
 
Brainstem: Normal. 
 
Vascular system: Expected arterial flow voids are present at the base of brain. Calvarium and skull base: Normal. 
 
Paranasal sinuses and mastoid air cells: Essentially clear. Couple of tiny mucus 
retention cysts in the maxillary sinuses. Visualized orbits: Unremarkable for a nondedicated exam. 
 
Visualized upper cervical spine: Unremarkable for a nondedicated exam. 
  
 Impression IMPRESSION: 
 
1. No acute brain abnormalities. 2.  Symmetric prominent susceptibility artifact in the basal ganglia suggesting 
increased iron/mineral deposition. XRAY (Most Recent) EKG No results found for this or any previous visit. 2D ECHO 10/29/20 ECHO ADULT COMPLETE 10/31/2020 10/31/2020 Narrative · LV: Estimated LVEF is 40 - 45%. Visually measured ejection fraction. Normal cavity size. Mild concentric hypertrophy. · RV: Mildly dilated right ventricle. Mildly reduced systolic function. · PA: Mild pulmonary hypertension. Pulmonary arterial systolic pressure is 46 mmHg. · IVC: Moderately elevated central venous pressure (10-15 mmHg); IVC  
diameter is larger than 21 mm and collapses more than 50% with  
respiration.  
   
  Signed by: Janeen Simon MD

## 2020-11-18 NOTE — PROGRESS NOTES
[facility], admissions, for [address]. [phone] Facility does not take Medicaid Pending. Daughter Dania Ward called and informed this CM that family has applied for Medicaid in [place] and [place]. Patients other daughter lives in [place]. Izzymargaret  would like the patient matched with [facility]. Matched in [place]. [employer] received packet. Called [employer], No answer. No voicemail. Spoke with daughter Dania Ward to inform that this Tenzin Daniel has been unable to find a facility that will take Medicaid PENDING. Matched with the entire state of [place]. Informed daughter that patient cannot stay at [facility] until [facility] is active, which takes about 45 days. Suggested that all the daughters and patient pull resources together and pay for first month of LTC. Dania Lepeyer refused and stated \"That is not possible, we don't have that money. \"  Informed daughter that patient will be discharged soon and family needs a plan. Izzymargaret  asked, \"But what about his fall the other day? \" Can we appeal the [employer] denial of SNF. Informed daughter that patient is walking over 200 feet with PT without any assistance and [employer] will not approve SNF, but daughter can call [employer] for herself. Ann Stinson RN BSN Care Manager [phone]

## 2020-11-18 NOTE — ROUTINE PROCESS
Bedside and Verbal shift change report given to Fortino Donato (oncoming nurse) by Patito Schneider (offgoing nurse). Report included the following information SBAR, Kardex, Intake/Output, MAR and Recent Results.

## 2020-11-18 NOTE — PROGRESS NOTES
Spoke with daughter Gurdeep Gomez about Long term care pending medicaid. Asked Gurdeep Gomez if the 3 daughters could take FMLA and take turns taking care of patient at home. Gurdeep Gomez replied, \"No way, there is no way we can take off of our jobs without pay for an unknown amount of time. I want you to know that we cannot take care of him. \"  Informed Gurdeep Gomez that we cannot keep patient in the hospital for 45 days. Gurdeep Gomez is hopeful that patient will be accepted at [facility] the [employer] where her sister lives. Spoke with Brandon Bazan at [facility] about accepting patient with Medicaid Pending. Brandon Bazan stated she would check with  and check on Medicaid application. Daughter has been talking with Brandon Bazan as well. Gurdeep Gomez has now requested a psych consult for patient with Dr Alden Merritt, RN BSN Care Manager [phone]

## 2020-11-19 NOTE — PROGRESS NOTES
Bedside shift change report given to Jackie Kelley RN (oncoming nurse) by SHAKA Aiken RN (offgoing nurse). Report included the following information SBAR, MAR and Recent Results.

## 2020-11-19 NOTE — PROGRESS NOTES
Nutrition Assessment Type and Reason for Visit: Reassess, Consult(Oral Nutrition Supplements) Nutrition Recommendations/Plan: - Modify supplement:  Decrease Ensure Enlive to once daily. Increase Magic Cup to BID 
- Monitor and encourage po intake as tolerated Nutrition Assessment:  Pt reported good appetite and meal intake. noted po intake of % of meals per chart documentation and pt report of recent few days. tolerating diet. likes Magic Cup supplements. Noted 5-6 unopened bottles of Ensure in room. pt agreeable to decreasing to once daily. Malnutrition Assessment: 
Malnutrition Status: No malnutrition Estimated Daily Nutrient Needs: 
Energy (kcal):  3048-8915 Protein (g):  58-72 Fluid (ml/day):  9684-9252 Nutrition Related Findings:  BM 11/17. folic acid, MVI, thiamine. confusion. Current Nutrition Therapies: DIET NUTRITIONAL SUPPLEMENTS Breakfast, Dinner; Ensure [employer] DIET NUTRITIONAL SUPPLEMENTS Lunch; Tiffani-Gumaro DIET CARDIAC Regular; FR 1500ML Anthropometric Measures: 
· Height:  5' 9\" (175.3 cm) · Current Body Wt:  71.2 kg (157 lb) · BMI: 23.2 Nutrition Diagnosis: · Predicted inadequate energy intake related to cognitive or neurological impairment as evidenced by (previous intake of 26-50% of meals, and fair intake of nutrition supplements; meal intake recently improving) Nutrition Intervention: 
Food and/or Nutrient Delivery: Continue current diet, Vitamin supplement, Modify oral nutrition supplement Nutrition Education and Counseling: No recommendations at this time Coordination of Nutrition Care: Continue to monitor while inpatient Goals: 
PO nutrition intake will meet >75% of patient estimated nutritional needs within the next 7 days. Nutrition Monitoring and Evaluation:  
Behavioral-Environmental Outcomes: None identified Food/Nutrient Intake Outcomes: Diet advancement/tolerance, Food and nutrient intake, Supplement intake, Vitamin/mineral intake Physical Signs/Symptoms Outcomes: Biochemical data, Chewing or swallowing, Meal time behavior, Nutrition focused physical findings Discharge Planning:   
Continue oral nutrition supplement, Continue current diet Electronically signed by Warren Mackey RD on 11/19/2020 at 4:37 PM 
 
Contact Number: [phone]

## 2020-11-19 NOTE — CONSULTS
Psychiatry note. The patient is seen in consultation after family had requested an evaluation. He had been admitted for confusional state with description of some hallucinations. He was described as having an encephalopathic appearance. He denies any prior history of psychiatric contacts. There is nothing in the record as to a psychiatric contact either. He does say that he had a history of some cocaine and cannabis use when very young but nothing for a prolonged amount of time. Also abused alcohol for a time but denied anything recently. He did say he had history of a DUI but denied any treatment programs. The patient was extremely poor historian and often confabulating answers. He was not aware of the date, place, situation but was aware person. He described himself to first be [de-identified]years of age and then later 81years of age though he was aware of his birthdate. He believed himself to be at the [facility] today though he could not explain why he was there since he says he never worked to their. He was able to talk of how he had been to one of his daughters weddings and that he has 3 daughters here and 1 daughter there that we cannot talk about what there was. He said he had 4 living daughters though there appears to be a question about that. He talked of how he is  then was  then is  again to the same woman. He spoke of her being both alive and dying and he was not certain which was true. He was confused and disorganized in his behaviors, picking up the telephone and putting it into the trash can. It had rang and he had great deal of difficulty figuring out how to answer it. He made up a story of how he had been at his daughter's wedding and decided it was a beautiful day so he started jogging somewhere and then fell down, got up and started jogging again.   He was aware that had been a recent presidential election though he was not certain who the current president was though he described him as having \"the carlos reelected himself and he has been it has been counting a lot. Mental status examination revealed the patient to be alert. He had been out in the hallway walking with daughter. When he came back he had a great deal of difficulty making decisions of simple things such as where to sit and how to organize himself when he sat down. He was oriented to person only. Speech was halting though not slurred. He did not evidence any difficulties in movement other than showing that he had a great deal of difficulty moving his right arm up and down saying he had experienced an injury when he fell. Mood was mildly anxious with a somewhat uncaring affect. Thought processing was confused and disorganized with a good degree of confabulation. He did not appear to be responding to internal stimuli and denied hallucinations or paranoia. He denied homicidal or suicidal ideas. He had difficulty with right and left discrimination. IQ was estimated in the low normal range. Insight and judgment were influenced by his confusional state. 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 The patient does not appear to have a functional psychiatric diagnosis that would respond to antipsychotics or antidepressants. If he does get agitated or aggressive it may be best to treat with low-dose benzodiazepine. At the current time, I do not have any other recommendations for psychiatric treatment and psychiatric follow-up would not be indicated. He will probably require placement in a environment which will assist in his care.

## 2020-11-19 NOTE — PROGRESS NOTES
Hospitalist Progress Note Patient: Ree Rodriguez Age: 79 y.o. : 1940 MR#: [id number] SSN: [id number] Date/Time: 2020 DOA: 10/29/2020 PCP: None Subjective:  
 
Patient is sitting in a chair in no apparent distress, awake, pleasantly confused Assessment/Plan: 1. Acute encephalopathy with visual hallucination, unclear etiology, Improved to new baseline 
     -Suspected vascular dementia 2. Acute systolic heart failure, With indeterminate troponin elevation on admission 3. Paroxymal atrial fibrillation with RVR, rate is controlled now Unable to anticoagulate due to recent GI bleed 4. Hypernatremia due to lack of fluid intake, improved 5. Acute renal insufficiency in the setting of lasix use and not maintain oral intake. Hypernatremia, due to decrease oral hydration 6. GI bleed due to Duodenal ulcer, s/p cauterize with bicap, s/p EGD Duodenal ulcer with biopsy showing H.pylori infection 7. Acute anemia due to blood loss, stable Hgb/Hct With Iron deficiency anemia 9. History of fall 11. H/o tobacco abuse 12. Right lower leg chronic wound Continue course of antibiotics and PPI for H. pylori On Lasix Continue beta-blocker, not on anticoagulation secondary to GI bleed Continue PT OT Wound care On Haldol twice daily Await psychiatry input Discussed with , discussed with RN Patient is a full code Case discussed with:  [x]Patient  []Family  [x]Nursing  [x]Case Management DVT Prophylaxis:  []Lovenox  []Hep SQ  [x]SCDs  []Coumadin   []On Heparin gtt Signed By: Dionne Carrasquillo MD   
 2020 Objective:  
VS:  
Visit Vitals /73 (BP 1 Location: Right arm, BP Patient Position: Sitting) Pulse 78 Temp 97.8 °F (36.6 °C) Resp 20 Ht 5' 9\" (1.753 m) Wt 70.3 kg (155 lb) SpO2 99% BMI 22.89 kg/m² Tmax/24hrs: Temp (24hrs), Av.7 °F (36.5 °C), Min:97.4 °F (36.3 °C), Max:98.2 °F (36.8 °C) Intake/Output Summary (Last 24 hours) at 2020 1736 Last data filed at 2020 1524 Gross per 24 hour Intake 1197 ml Output  Net 1197 ml General:  Awake, pleasantly confused, follows simple commands Cardiovascular:  S1S2+, RRR Pulmonary:  CTA b/l GI:  Soft, BS+, NT, ND Extremities:  No edema Current Facility-Administered Medications Medication Dose Route Frequency  melatonin tablet 6 mg  6 mg Oral QHS  haloperidoL (HALDOL) tablet 5 mg  5 mg Oral BID  amoxicillin (AMOXIL) capsule 1,000 mg  1,000 mg Oral Q12H  clarithromycin (BIAXIN) 250 mg/5 mL oral suspension 500 mg  500 mg Oral Q12H  pantoprazole (PROTONIX) tablet 40 mg  40 mg Oral BID  thiamine HCL (B-1) tablet 100 mg  100 mg Oral DAILY  sucralfate (CARAFATE) tablet 1 g  1 g Oral AC&HS  
 metoprolol tartrate (LOPRESSOR) tablet 50 mg  50 mg Oral Q12H  
 metoprolol (LOPRESSOR) injection 5 mg  5 mg IntraVENous Q4H PRN  
 sodium chloride (NS) flush 5-40 mL  5-40 mL IntraVENous PRN  
 furosemide (LASIX) tablet 20 mg  20 mg Oral DAILY  hydrALAZINE (APRESOLINE) 20 mg/mL injection 10 mg  10 mg IntraVENous Q6H PRN  
 [Held by provider] aspirin chewable tablet 81 mg  81 mg Oral DAILY  therapeutic multivitamin (THERAGRAN) tablet 1 Tab  1 Tab Oral DAILY  folic acid (FOLVITE) tablet 1 mg  1 mg Oral DAILY  acetaminophen (TYLENOL) tablet 650 mg  650 mg Oral Q6H PRN Or  
 acetaminophen (TYLENOL) suppository 650 mg  650 mg Rectal Q6H PRN  polyethylene glycol (MIRALAX) packet 17 g  17 g Oral DAILY PRN  promethazine (PHENERGAN) tablet 12.5 mg  12.5 mg Oral Q6H PRN Or  
 ondansetron (ZOFRAN) injection 4 mg  4 mg IntraVENous Q6H PRN Lab/Data Review: 
Labs: Results:  
   
Chemistry Recent Labs 20 
1525 GLU 94 * K 3.7  CO2 28 BUN 24* CREA 1.26  
BUCR 19  
 AGAP 8  
CA 8.2* Recent Labs 11/17/20 
1525 ALT 16  
TP 6.2* ALB 2.9*  
GLOB 3.3 AGRAT 0.9  
  
CBC w/Diff Recent Labs 11/17/20 
1525 WBC 7.5  
RBC 3.27* HGB 9.7* HCT 32.4* MCV 99.1*  
MCH 29.7 MCHC 29.9*  
RDW 15.0*  
 GRANS 61 LYMPH 27 EOS 2 Coagulation No results for input(s): PTP, INR, APTT, INREXT, INREXT in the last 72 hours. Iron/Ferritin Lab Results Component Value Date/Time Iron 48 (L) 11/03/2020 12:55 AM  
 TIBC 232 (L) 11/03/2020 12:55 AM  
 Iron % saturation 21 11/03/2020 12:55 AM  
 Ferritin 63 11/03/2020 12:55 AM  
   
BNP Cardiac Enzymes Lab Results Component Value Date/Time CK 83 10/30/2020 08:14 AM  
 CK - MB 1.2 10/30/2020 08:14 AM  
 CK-MB Index 1.4 10/30/2020 08:14 AM  
 Troponin-I, QT 0.04 10/30/2020 08:14 AM  
 
  
Lactic Acid Thyroid Studies All Micro Results Procedure Component Value Units Date/Time CULTURE, URINE [[id number]] Collected:  11/05/20 1900 Order Status:  Completed Specimen:  Cath Urine Updated:  11/07/20 1039 Special Requests: NO SPECIAL REQUESTS [place] Count --     
  [id number] COLONIES/mL Culture result:    
  MIXED UROGENITAL ENMANUEL ISOLATED Images: 
 
CT (Most Recent). CT Results (most recent): 
Results from Hospital Encounter encounter on 10/29/20 CT HEAD WO CONT Narrative CT OF THE BRAIN 
 
COMPARISON: None. INDICATIONS: Fall and memory loss. TECHNIQUE: Volumetric data acquisition was performed   through the brain on a 
multislice scanner and reconstructed in the axial plane. FINDINGS:   
 
The ventricles and CSF spaces are enlarged consistent with age associated 
atrophy. There is no midline shift. Tonye Cogan The brain parenchyma is of generally normal attenuation. There is decreased 
attenuation in the periventricular white matter consistent with presumed 
microvascular disease. There is no hemorrhage evident. There are no pathologic fluid collections. There are no pathologic calcifications. . 
. The visible portions of the paranasal sinuses: Are clear. Impression IMPRESSION:  
 
Atrophy and microvascular disease. No acute intracranial process. All CT scans at this facility are performed using dose optimization technique as 
appropriate to the performed exam, to include automated exposure control, 
adjustment of the mA and/or kV according to patient's size (Including 
appropriate matching for site-specific examinations), or use of iterative 
reconstruction technique. MRI Results (most recent): 
Results from Hospital Encounter encounter on 10/29/20 MRI BRAIN WO CONT Narrative Brain MR without contrast 
 
HISTORY: Confusion, memory loss, hallucinations and falls. COMPARISON: CT 10/29/2020 TECHNIQUE: Brain scanned with sagittal and axial T1W scans, axial T2W , axial 
FLAIR, axial diffusion weighted images and SWAN. FINDINGS: Details are limited by mild-to-moderate motion artifact. Cerebral parenchyma: No restricted diffusion abnormalities to suggest acute 
stroke. No evidence of edema, mass effect or mass lesion. No significant T2 or FLAIR hyperintense abnormalities. Prominent symmetric susceptibility hypointense 
signal in the basal ganglia. Brain volumes and ventricular system: Normal in size and morphology for the 
patient's age. Midline structures: Normal. 
 
Cerebellum: Normal. 
 
Brainstem: Normal. 
 
Vascular system: Expected arterial flow voids are present at the base of brain. Calvarium and skull base: Normal. 
 
Paranasal sinuses and mastoid air cells: Essentially clear. Couple of tiny mucus 
retention cysts in the maxillary sinuses. Visualized orbits: Unremarkable for a nondedicated exam. 
 
Visualized upper cervical spine: Unremarkable for a nondedicated exam. 
  
 Impression IMPRESSION: 
 
1. No acute brain abnormalities.  
2.  Symmetric prominent susceptibility artifact in the basal ganglia suggesting 
increased iron/mineral deposition. XRAY (Most Recent) EKG No results found for this or any previous visit. 2D ECHO 10/29/20 ECHO ADULT COMPLETE 10/31/2020 10/31/2020 Narrative · LV: Estimated LVEF is 40 - 45%. Visually measured ejection fraction. Normal cavity size. Mild concentric hypertrophy. · RV: Mildly dilated right ventricle. Mildly reduced systolic function. · PA: Mild pulmonary hypertension. Pulmonary arterial systolic pressure is 46 mmHg. · IVC: Moderately elevated central venous pressure (10-15 mmHg); IVC  
diameter is larger than 21 mm and collapses more than 50% with  
respiration.  
   
  Signed by: Erin Guzmán MD

## 2020-11-19 NOTE — PROGRESS NOTES
Left message for Ms. Cruzand with [facility] to call this CM back. 1641: received call back from Ms. Reina and is anticipating expediting medicaid application. HARPAL Mitchell, [place]- [phone]

## 2020-11-19 NOTE — ROUTINE PROCESS
Bedside and Verbal shift change report given to [address], RN (oncoming nurse) by Jonel Collado RN (offgoing nurse). Report included the following information SBAR, Kardex, Intake/Output and MAR.

## 2020-11-19 NOTE — PROGRESS NOTES
Daughter in to visit from [address] [place] [address] last evening. Gave daughter the number for [facility] [phone] to call for assistance with placement until Medicaid is active. Pari Manzanares RN BSN Care Manager [phone]

## 2020-11-20 NOTE — PROGRESS NOTES
Nya Jimenez  Daughter  [phone] Called Daughter Brigido Maurer about the need to call [facility] stated she had already called [employer] and provided the statement that was needed regarding Life insurance. Informed Brigido Maurer that a group home has been found and to call [address] @ [phone] and make financial arrangements today. Brigido Maurer stated she would call and call this writer back. Nain Hickman, RN BSN Care Manager [phone]

## 2020-11-20 NOTE — PROGRESS NOTES
Spoke with Ms. Bryce Doll, group home owner, who stated she spoke with pt's daughter. Group home can accept pt and will provide 24 hours supervision. Ms Valero Leonides address is [place] [address]  [place], [place]. Ms Bryce Doll can accept pt up until 7pm tonight. Updated Ava OROZCO. Miki Lima [address], [facility], [place]- [phone]

## 2020-11-20 NOTE — PROGRESS NOTES
Spoke with Ms. Yoli Farrell, medicaid worker, who stated that she has been working with [address] with medicaid and is waiting for a statement from daughter and feels confident after she receives statement medicaid would be approved. Updated CMInga to help facilitate daughter getting in touch with [employer].  
 
 
Left message for Ms. Annette Ashby [phone], [facility] home care, to call this CM back regarding taking pt to her group home. Ms. Annette Ashby is aware that pt currently does not have medicaid (still pending) and needs 24 hours supervision. If accepted, pt would have a higher rent due to supervision needs. Forrest Pool, MSW, [place]- [phone]

## 2020-11-20 NOTE — PROGRESS NOTES
Hospitalist Progress Note Patient: Shannan Alcazar Age: 79 y.o. : 1940 MR#: [id number] SSN: [id number] Date/Time: 2020 DOA: 10/29/2020 PCP: None Subjective:  
 
Patient is laying in bed in no apparent distress, awake, follows simple commands, denies any chest pain Assessment/Plan: 1. Acute encephalopathy with visual hallucination, unclear etiology, Improved to new baseline 
     -Suspected vascular dementia. Psychiatry input noted 2. Acute systolic heart failure, With indeterminate troponin elevation on admission 3. Paroxymal atrial fibrillation with RVR, rate is controlled now Unable to anticoagulate due to recent GI bleed 4. Hypernatremia due to lack of fluid intake, improved 5. Acute renal insufficiency in the setting of lasix use and not maintain oral intake. Hypernatremia, due to decrease oral hydration 6. GI bleed due to Duodenal ulcer, s/p cauterize with bicap, s/p EGD Duodenal ulcer with biopsy showing H.pylori infection 7. Acute anemia due to blood loss, stable Hgb/Hct With Iron deficiency anemia 9. History of fall 11. H/o tobacco abuse 12. Right lower leg chronic wound Status post course of antibiotics and PPI for H. pylori On Lasix Continue beta-blocker, not on anticoagulation secondary to GI bleed PT OT Wound care On Haldol twice daily Psychiatry input noted Discussed with , I called and discussed with patient's daughter Enrrique Tompkins over the phone. Patient is full code Case discussed with:  [x]Patient  []Family  [x]Nursing  [x]Case Management DVT Prophylaxis:  []Lovenox  []Hep SQ  [x]SCDs  []Coumadin   []On Heparin gtt Signed By: Hammad Gallo MD   
 2020 Objective:  
VS:  
Visit Vitals /64 (BP 1 Location: Right arm, BP Patient Position: Sitting) Pulse 93 Temp 97.8 °F (36.6 °C) Resp 18 Ht 5' 9\" (1.753 m) Wt 69.4 kg (153 lb) SpO2 100% BMI 22.59 kg/m² Tmax/24hrs: Temp (24hrs), Av.8 °F (36.6 °C), Min:97.2 °F (36.2 °C), Max:98 °F (36.7 °C) Intake/Output Summary (Last 24 hours) at 2020 1507 Last data filed at 2020 4394 Gross per 24 hour Intake 240 ml Output  Net 240 ml General:  Awake, pleasantly confused Cardiovascular:  S1S2+, RRR Pulmonary:  CTA b/l GI:  Soft, BS+, NT, ND Extremities:  No edema Current Facility-Administered Medications Medication Dose Route Frequency  melatonin tablet 6 mg  6 mg Oral QHS  haloperidoL (HALDOL) tablet 5 mg  5 mg Oral BID  amoxicillin (AMOXIL) capsule 1,000 mg  1,000 mg Oral Q12H  clarithromycin (BIAXIN) 250 mg/5 mL oral suspension 500 mg  500 mg Oral Q12H  pantoprazole (PROTONIX) tablet 40 mg  40 mg Oral BID  thiamine HCL (B-1) tablet 100 mg  100 mg Oral DAILY  sucralfate (CARAFATE) tablet 1 g  1 g Oral AC&HS  
 metoprolol tartrate (LOPRESSOR) tablet 50 mg  50 mg Oral Q12H  
 metoprolol (LOPRESSOR) injection 5 mg  5 mg IntraVENous Q4H PRN  
 sodium chloride (NS) flush 5-40 mL  5-40 mL IntraVENous PRN  
 furosemide (LASIX) tablet 20 mg  20 mg Oral DAILY  hydrALAZINE (APRESOLINE) 20 mg/mL injection 10 mg  10 mg IntraVENous Q6H PRN  
 [Held by provider] aspirin chewable tablet 81 mg  81 mg Oral DAILY  therapeutic multivitamin (THERAGRAN) tablet 1 Tab  1 Tab Oral DAILY  folic acid (FOLVITE) tablet 1 mg  1 mg Oral DAILY  acetaminophen (TYLENOL) tablet 650 mg  650 mg Oral Q6H PRN Or  
 acetaminophen (TYLENOL) suppository 650 mg  650 mg Rectal Q6H PRN  polyethylene glycol (MIRALAX) packet 17 g  17 g Oral DAILY PRN  promethazine (PHENERGAN) tablet 12.5 mg  12.5 mg Oral Q6H PRN Or  
 ondansetron (ZOFRAN) injection 4 mg  4 mg IntraVENous Q6H PRN Lab/Data Review: 
Labs: Results:  
   
Chemistry Recent Labs  
  20 
0314 20 
1525 GLU 88 94  147* K 3.5 3.7  111 CO2 30 28 BUN 23* 24* CREA 1.13 1.26  
BUCR 20 19 AGAP 5 8  
CA 8.6 8.2* Recent Labs 11/17/20 
1525 ALT 16  
TP 6.2* ALB 2.9*  
GLOB 3.3 AGRAT 0.9  
  
CBC w/Diff Recent Labs  
  11/20/20 
0314 11/17/20 
1525 WBC 6.1 7.5  
RBC 3.10* 3.27* HGB 9.2* 9.7* HCT 30.7* 32.4* MCV 99.0* 99.1*  
MCH 29.7 29.7 MCHC 30.0* 29.9*  
RDW 15.1* 15.0*  
 173 GRANS 54 61 LYMPH 33 27 EOS 4 2 Coagulation No results for input(s): PTP, INR, APTT, INREXT, INREXT in the last 72 hours. Iron/Ferritin Lab Results Component Value Date/Time Iron 48 (L) 11/03/2020 12:55 AM  
 TIBC 232 (L) 11/03/2020 12:55 AM  
 Iron % saturation 21 11/03/2020 12:55 AM  
 Ferritin 63 11/03/2020 12:55 AM  
   
BNP Cardiac Enzymes Lab Results Component Value Date/Time CK 83 10/30/2020 08:14 AM  
 CK - MB 1.2 10/30/2020 08:14 AM  
 CK-MB Index 1.4 10/30/2020 08:14 AM  
 Troponin-I, QT 0.04 10/30/2020 08:14 AM  
 
  
Lactic Acid Thyroid Studies All Micro Results Procedure Component Value Units Date/Time CULTURE, URINE [[id number]] Collected:  11/05/20 1900 Order Status:  Completed Specimen:  Cath Urine Updated:  11/07/20 1039 Special Requests: NO SPECIAL REQUESTS [place] Count --     
  [id number] COLONIES/mL Culture result:    
  MIXED UROGENITAL ENMANUEL ISOLATED Images: 
 
CT (Most Recent). CT Results (most recent): 
Results from Hospital Encounter encounter on 10/29/20 CT HEAD WO CONT Narrative CT OF THE BRAIN 
 
COMPARISON: None. INDICATIONS: Fall and memory loss. TECHNIQUE: Volumetric data acquisition was performed   through the brain on a 
multislice scanner and reconstructed in the axial plane. FINDINGS:   
 
The ventricles and CSF spaces are enlarged consistent with age associated 
atrophy. There is no midline shift. Gayle Rhea The brain parenchyma is of generally normal attenuation. There is decreased attenuation in the periventricular white matter consistent with presumed 
microvascular disease. There is no hemorrhage evident. There are no pathologic fluid collections. There are no pathologic calcifications. . 
. The visible portions of the paranasal sinuses: Are clear. Impression IMPRESSION:  
 
Atrophy and microvascular disease. No acute intracranial process. All CT scans at this facility are performed using dose optimization technique as 
appropriate to the performed exam, to include automated exposure control, 
adjustment of the mA and/or kV according to patient's size (Including 
appropriate matching for site-specific examinations), or use of iterative 
reconstruction technique. MRI Results (most recent): 
Results from Hospital Encounter encounter on 10/29/20 MRI BRAIN WO CONT Narrative Brain MR without contrast 
 
HISTORY: Confusion, memory loss, hallucinations and falls. COMPARISON: CT 10/29/2020 TECHNIQUE: Brain scanned with sagittal and axial T1W scans, axial T2W , axial 
FLAIR, axial diffusion weighted images and SWAN. FINDINGS: Details are limited by mild-to-moderate motion artifact. Cerebral parenchyma: No restricted diffusion abnormalities to suggest acute 
stroke. No evidence of edema, mass effect or mass lesion. No significant T2 or FLAIR hyperintense abnormalities. Prominent symmetric susceptibility hypointense 
signal in the basal ganglia. Brain volumes and ventricular system: Normal in size and morphology for the 
patient's age. Midline structures: Normal. 
 
Cerebellum: Normal. 
 
Brainstem: Normal. 
 
Vascular system: Expected arterial flow voids are present at the base of brain. Calvarium and skull base: Normal. 
 
Paranasal sinuses and mastoid air cells: Essentially clear. Couple of tiny mucus 
retention cysts in the maxillary sinuses.  
 
Visualized orbits: Unremarkable for a nondedicated exam. 
 
 Visualized upper cervical spine: Unremarkable for a nondedicated exam. 
  
 Impression IMPRESSION: 
 
1. No acute brain abnormalities. 2.  Symmetric prominent susceptibility artifact in the basal ganglia suggesting 
increased iron/mineral deposition. XRAY (Most Recent) EKG No results found for this or any previous visit. 2D ECHO 10/29/20 ECHO ADULT COMPLETE 10/31/2020 10/31/2020 Narrative · LV: Estimated LVEF is 40 - 45%. Visually measured ejection fraction. Normal cavity size. Mild concentric hypertrophy. · RV: Mildly dilated right ventricle. Mildly reduced systolic function. · PA: Mild pulmonary hypertension. Pulmonary arterial systolic pressure is 46 mmHg. · IVC: Moderately elevated central venous pressure (10-15 mmHg); IVC  
diameter is larger than 21 mm and collapses more than 50% with  
respiration.  
   
  Signed by: Shlomo Wiggins MD

## 2020-11-20 NOTE — PROGRESS NOTES
Received call from Ms. Dawit Valdovinos. Group home can accept pt and will provide 24 hours supervision. Daughter will need to call [facility] @ [phone] and make financial arrangements today. Ms Gal Correa address is [place] [address] [address], [facility]. Ms Marycarmen Gonzalez can accept pt up until 7pm tonight. Updated Shayan Abler. Cristin Pack, MSPATRICK, [facility]- [phone]

## 2020-11-20 NOTE — ROUTINE PROCESS
Bedside and Verbal shift change report given to Shahab Teresa (oncoming nurse) by Мария Quach RN (offgoing nurse). Report included the following information SBAR, Kardex, Intake/Output and MAR.

## 2020-11-20 NOTE — PROGRESS NOTES
Juliet Smith 103 with Colette Sheehan who stated she did call the group home and wanted to go \"check out the place. \" Informed Akiko Cool that the patient has a safe discharge and cannot stay in the hospital over the weekend. Dr Debora Briones informed of discharge process going forward since there is a place that provides 24/7 care. [address] to inform of discharge. Akiko Cool wanted to appeal the discharge. Important Message from [facility]" reviewed and explained with the patient and/or representative Akiko Cool over the phone and signature was obtained. A signed copy provided to patient/representative. Original signed document placed in patient's chart. Important message and detailed notice of discharge verbally signed over the phone with daughter Colette Sheehan. Akiko Cool stated that sister will call the appeal number which was given over the phone. Esteban Goss RN BSN Care Manager [phone]

## 2020-11-21 NOTE — PROGRESS NOTES
Problem: Falls - Risk of 
Goal: *Absence of Falls Description: Document Pineda Gamboa Fall Risk and appropriate interventions in the flowsheet. Outcome: Progressing Towards Goal 
Note: Fall Risk Interventions: 
Mobility Interventions: Assess mobility with egress test, Bed/chair exit alarm, Patient to call before getting OOB, Strengthening exercises (ROM-active/passive) Mentation Interventions: Adequate sleep, hydration, pain control, Bed/chair exit alarm, Evaluate medications/consider consulting pharmacy, Increase mobility, Reorient patient, Room close to nurse's station, Toileting rounds Medication Interventions: Assess postural VS orthostatic hypotension, Bed/chair exit alarm, Evaluate medications/consider consulting pharmacy, Patient to call before getting OOB, Teach patient to arise slowly Elimination Interventions: Bed/chair exit alarm, Call light in reach, Patient to call for help with toileting needs, Toileting schedule/hourly rounds History of Falls Interventions: Bed/chair exit alarm, Door open when patient unattended, Evaluate medications/consider consulting pharmacy, Room close to nurse's station Problem: Nutrition Deficit Goal: *Optimize nutritional status Outcome: Progressing Towards Goal 
  
Problem: Pressure Injury - Risk of 
Goal: *Prevention of pressure injury Description: Document Rafa Scale and appropriate interventions in the flowsheet. Outcome: Progressing Towards Goal 
Note: Pressure Injury Interventions: 
Sensory Interventions: Assess changes in LOC, Check visual cues for pain, Discuss PT/OT consult with provider, Float heels, Keep linens dry and wrinkle-free, Maintain/enhance activity level, Minimize linen layers, Pressure redistribution bed/mattress (bed type), Turn and reposition approx. every two hours (pillows and wedges if needed) Moisture Interventions: Absorbent underpads, Apply protective barrier, creams and emollients, Check for incontinence Q2 hours and as needed, Limit adult briefs, Maintain skin hydration (lotion/cream), Minimize layers Activity Interventions: Pressure redistribution bed/mattress(bed type) Mobility Interventions: Pressure redistribution bed/mattress (bed type), PT/OT evaluation, Turn and reposition approx. every two hours(pillow and wedges) Nutrition Interventions: Document food/fluid/supplement intake, Offer support with meals,snacks and hydration Friction and Shear Interventions: HOB 30 degrees or less, Minimize layers

## 2020-11-21 NOTE — PROGRESS NOTES
Hospitalist Progress Note Patient: Sonu Hill Age: 79 y.o. : 1940 MR#: [id number] SSN: [id number] Date/Time: 2020 DOA: 10/29/2020 PCP: None Subjective:  
 
Patient is laying in bed, sleeping, awakens to verbal commands Assessment/Plan: 1. Acute encephalopathy with visual hallucination, unclear etiology, Improved to new baseline 
     -Likely vascular dementia 2. Acute systolic heart failure, With indeterminate troponin elevation on admission 3. Paroxymal atrial fibrillation with RVR, now rate controlled Not on anticoagulation secondary to recent GI bleed 4. Hypernatremia due to lack of fluid intake,improved 5. Acute renal insufficiency in the setting of lasix use and not maintain oral intake. Hypernatremia, due to decrease oral hydrationimproved 6. GI bleed due to Duodenal ulcer, s/p cauterize with bicap, s/p EGD Duodenal ulcer with biopsy showing H.pylori infection 7. Acute anemia due to blood loss, stable Hgb/Hct With Iron deficiency anemia 9. History of fall 11. H/o tobacco abuse 12. Right lower leg chronic wound Status post antibiotics for H pylori. Continue proton pump inhibitor On Lasix Continue beta-blocker, not on anticoagulation secondary to GI bleed Continue PT and OT Continue wound care On Haldol twice daily Psychiatry input noted Discussed with RN 
As per PT OT patient needs 24/7 supervision. Patient remains hemodynamically stable Patient is full code Case discussed with:  [x]Patient  []Family  [x]Nursing  []Case Management DVT Prophylaxis:  []Lovenox  []Hep SQ  [x]SCDs  []Coumadin   []On Heparin gtt Signed By: Lissette Gamboa MD   
 2020 Objective:  
VS:  
Visit Vitals BP (!) 131/93 Pulse 73 Temp 97.5 °F (36.4 °C) Resp 19 Ht 5' 9\" (1.753 m) Wt 69.4 kg (153 lb) SpO2 98% BMI 22.59 kg/m² Tmax/24hrs: Temp (24hrs), Av.6 °F (36.4 °C), Min:97.2 °F (36.2 °C), Max:97.8 °F (36.6 °C) No intake or output data in the 24 hours ending 20 1618 General: Sleepy, awakens to verbal commands Cardiovascular:  S1S2+, RRR Pulmonary:  CTA b/l GI:  Soft, BS+, NT, ND Extremities:  No edema Current Facility-Administered Medications Medication Dose Route Frequency  melatonin tablet 6 mg  6 mg Oral QHS  haloperidoL (HALDOL) tablet 5 mg  5 mg Oral BID  pantoprazole (PROTONIX) tablet 40 mg  40 mg Oral BID  thiamine HCL (B-1) tablet 100 mg  100 mg Oral DAILY  sucralfate (CARAFATE) tablet 1 g  1 g Oral AC&HS  
 metoprolol tartrate (LOPRESSOR) tablet 50 mg  50 mg Oral Q12H  
 metoprolol (LOPRESSOR) injection 5 mg  5 mg IntraVENous Q4H PRN  
 sodium chloride (NS) flush 5-40 mL  5-40 mL IntraVENous PRN  
 furosemide (LASIX) tablet 20 mg  20 mg Oral DAILY  hydrALAZINE (APRESOLINE) 20 mg/mL injection 10 mg  10 mg IntraVENous Q6H PRN  
 [Held by provider] aspirin chewable tablet 81 mg  81 mg Oral DAILY  therapeutic multivitamin (THERAGRAN) tablet 1 Tab  1 Tab Oral DAILY  folic acid (FOLVITE) tablet 1 mg  1 mg Oral DAILY  acetaminophen (TYLENOL) tablet 650 mg  650 mg Oral Q6H PRN Or  
 acetaminophen (TYLENOL) suppository 650 mg  650 mg Rectal Q6H PRN  polyethylene glycol (MIRALAX) packet 17 g  17 g Oral DAILY PRN  promethazine (PHENERGAN) tablet 12.5 mg  12.5 mg Oral Q6H PRN Or  
 ondansetron (ZOFRAN) injection 4 mg  4 mg IntraVENous Q6H PRN Lab/Data Review: 
Labs: Results:  
   
Chemistry Recent Labs  
  20 
8917 GLU 88   
K 3.5  CO2 30 BUN 23* CREA 1.13  
BUCR 20 AGAP 5  
CA 8.6 No results for input(s): TBIL, ALT, ALKP, TP, ALB, GLOB, AGRAT in the last 72 hours. No lab exists for component: SGOT  
CBC w/Diff Recent Labs  
  20 
0314 WBC 6.1  
RBC 3.10* HGB 9.2* HCT 30.7* MCV 99.0*  
MCH 29.7 MCHC 30.0*  
RDW 15.1*  
 GRANS 54 LYMPH 33 EOS 4 Coagulation No results for input(s): PTP, INR, APTT, INREXT, INREXT in the last 72 hours. Iron/Ferritin Lab Results Component Value Date/Time Iron 48 (L) 11/03/2020 12:55 AM  
 TIBC 232 (L) 11/03/2020 12:55 AM  
 Iron % saturation 21 11/03/2020 12:55 AM  
 Ferritin 63 11/03/2020 12:55 AM  
   
BNP Cardiac Enzymes Lab Results Component Value Date/Time CK 83 10/30/2020 08:14 AM  
 CK - MB 1.2 10/30/2020 08:14 AM  
 CK-MB Index 1.4 10/30/2020 08:14 AM  
 Troponin-I, QT 0.04 10/30/2020 08:14 AM  
 
  
Lactic Acid Thyroid Studies All Micro Results Procedure Component Value Units Date/Time CULTURE, URINE [[id number]] Collected:  11/05/20 1900 Order Status:  Completed Specimen:  Cath Urine Updated:  11/07/20 1039 Special Requests: NO SPECIAL REQUESTS [place] Count --     
  [id number] COLONIES/mL Culture result:    
  MIXED UROGENITAL ENMANUEL ISOLATED Images: 
 
CT (Most Recent). CT Results (most recent): 
Results from Hospital Encounter encounter on 10/29/20 CT HEAD WO CONT Narrative CT OF THE BRAIN 
 
COMPARISON: None. INDICATIONS: Fall and memory loss. TECHNIQUE: Volumetric data acquisition was performed   through the brain on a 
multislice scanner and reconstructed in the axial plane. FINDINGS:   
 
The ventricles and CSF spaces are enlarged consistent with age associated 
atrophy. There is no midline shift. Garcia Pee The brain parenchyma is of generally normal attenuation. There is decreased 
attenuation in the periventricular white matter consistent with presumed 
microvascular disease. There is no hemorrhage evident. There are no pathologic fluid collections. There are no pathologic calcifications. . 
. The visible portions of the paranasal sinuses: Are clear. Impression IMPRESSION:  
 
Atrophy and microvascular disease. No acute intracranial process. All CT scans at this facility are performed using dose optimization technique as 
appropriate to the performed exam, to include automated exposure control, 
adjustment of the mA and/or kV according to patient's size (Including 
appropriate matching for site-specific examinations), or use of iterative 
reconstruction technique. MRI Results (most recent): 
Results from Hospital Encounter encounter on 10/29/20 MRI BRAIN WO CONT Narrative Brain MR without contrast 
 
HISTORY: Confusion, memory loss, hallucinations and falls. COMPARISON: CT 10/29/2020 TECHNIQUE: Brain scanned with sagittal and axial T1W scans, axial T2W , axial 
FLAIR, axial diffusion weighted images and SWAN. FINDINGS: Details are limited by mild-to-moderate motion artifact. Cerebral parenchyma: No restricted diffusion abnormalities to suggest acute 
stroke. No evidence of edema, mass effect or mass lesion. No significant T2 or FLAIR hyperintense abnormalities. Prominent symmetric susceptibility hypointense 
signal in the basal ganglia. Brain volumes and ventricular system: Normal in size and morphology for the 
patient's age. Midline structures: Normal. 
 
Cerebellum: Normal. 
 
Brainstem: Normal. 
 
Vascular system: Expected arterial flow voids are present at the base of brain. Calvarium and skull base: Normal. 
 
Paranasal sinuses and mastoid air cells: Essentially clear. Couple of tiny mucus 
retention cysts in the maxillary sinuses. Visualized orbits: Unremarkable for a nondedicated exam. 
 
Visualized upper cervical spine: Unremarkable for a nondedicated exam. 
  
 Impression IMPRESSION: 
 
1. No acute brain abnormalities. 2.  Symmetric prominent susceptibility artifact in the basal ganglia suggesting 
increased iron/mineral deposition. XRAY (Most Recent) EKG No results found for this or any previous visit. 2D ECHO 10/29/20 ECHO ADULT COMPLETE 10/31/2020 10/31/2020 Narrative · LV: Estimated LVEF is 40 - 45%. Visually measured ejection fraction. Normal cavity size. Mild concentric hypertrophy. · RV: Mildly dilated right ventricle. Mildly reduced systolic function. · PA: Mild pulmonary hypertension. Pulmonary arterial systolic pressure is 46 mmHg. · IVC: Moderately elevated central venous pressure (10-15 mmHg); IVC  
diameter is larger than 21 mm and collapses more than 50% with  
respiration.  
   
  Signed by: Urban Ramos MD

## 2020-11-21 NOTE — ROUTINE PROCESS
Bedside shift change report given to [address] (oncoming nurse) by Shila Cantor (offgoing nurse). Report included the following information SBAR, Kardex, MAR, Recent Results and Med Rec Status.

## 2020-11-21 NOTE — PROGRESS NOTES
Received notification via phone and fax from [address] [facility] [address] that discharge appeal has been initiated on behalf of the patient. Appeal control ID # [id number]. Medical records uploaded to [facility] via [address] [address] secure file transfer portal. 
 
Mata Rivera MSN, RN, ACM-RN Care Management [phone]

## 2020-11-21 NOTE — ROUTINE PROCESS
Bedside shift change report given to Carolyn Erickson RN (oncoming nurse) by Edyta Ariza RN (offgoing nurse). Report included the following information SBAR, Kardex, MAR and Cardiac Rhythm Afib.

## 2020-11-22 NOTE — PROGRESS NOTES
Hospitalist Progress Note Patient: Lennox Brush Age: 79 y.o. : 1940 MR#: [id number] SSN: [id number] Date/Time: 2020 DOA: 10/29/2020 PCP: None Subjective:  
 
Patient is seen ambulating in the hallway with nursing staff. Subsequently patient was seen in room. Assessment/Plan: 1. Acute encephalopathy with visual hallucination, unclear etiology, Improved to new baseline 
     -Likely vascular dementia 2. Acute systolic heart failure, With indeterminate troponin elevation on admission 3. Paroxymal atrial fibrillation with RVR, now rate controlled Not on anticoagulation secondary to recent GI bleed 4. Hypernatremia due to lack of fluid intake,improved 5. Acute renal insufficiency in the setting of lasix use and not maintain oral intake. Hypernatremia, due to decrease oral hydrationimproved 6. GI bleed due to Duodenal ulcer, s/p cauterize with bicap, s/p EGD Duodenal ulcer with biopsy showing H.pylori infection 7. Acute anemia due to blood loss, stable Hgb/Hct With Iron deficiency anemia 9. History of fall 11. H/o tobacco abuse 12. Right lower leg chronic wound Continue PPI, status post antibiotics for H pylori Continue Lasix On beta-blocker, not on anticoagulation secondary to GI bleed Wound care PT, OT On Haldol twice daily Psychiatry input noted Discussed with RN 
As per PT OT patient needs / supervision. I called patient's daughter Almaz Bello at phone #[phone] and updated her. Patient remains hemodynamically stable Patient is full code Case discussed with:  [x]Patient  []Family  [x]Nursing  []Case Management DVT Prophylaxis:  []Lovenox  []Hep SQ  [x]SCDs  []Coumadin   []On Heparin gtt Signed By: Carla Ahn MD   
 2020 Objective:  
VS:  
Visit Vitals /61 (BP 1 Location: Right arm, BP Patient Position: Sitting) Pulse 96 Temp 97.3 °F (36.3 °C) Resp 18 Ht 5' 9\" (1.753 m) Wt 69.4 kg (153 lb) SpO2 100% BMI 22.59 kg/m² Tmax/24hrs: Temp (24hrs), Av.6 °F (36.4 °C), Min:97.3 °F (36.3 °C), Max:98.3 °F (36.8 °C) No intake or output data in the 24 hours ending 20 6457 General:  Awake, pleasantly confused Cardiovascular:  S1S2+, RRR Pulmonary:  CTA b/l GI:  Soft, BS+, NT, ND Extremities:  No edema Current Facility-Administered Medications Medication Dose Route Frequency  melatonin tablet 6 mg  6 mg Oral QHS  haloperidoL (HALDOL) tablet 5 mg  5 mg Oral BID  pantoprazole (PROTONIX) tablet 40 mg  40 mg Oral BID  thiamine HCL (B-1) tablet 100 mg  100 mg Oral DAILY  sucralfate (CARAFATE) tablet 1 g  1 g Oral AC&HS  
 metoprolol tartrate (LOPRESSOR) tablet 50 mg  50 mg Oral Q12H  
 metoprolol (LOPRESSOR) injection 5 mg  5 mg IntraVENous Q4H PRN  
 sodium chloride (NS) flush 5-40 mL  5-40 mL IntraVENous PRN  
 furosemide (LASIX) tablet 20 mg  20 mg Oral DAILY  hydrALAZINE (APRESOLINE) 20 mg/mL injection 10 mg  10 mg IntraVENous Q6H PRN  
 [Held by provider] aspirin chewable tablet 81 mg  81 mg Oral DAILY  therapeutic multivitamin (THERAGRAN) tablet 1 Tab  1 Tab Oral DAILY  folic acid (FOLVITE) tablet 1 mg  1 mg Oral DAILY  acetaminophen (TYLENOL) tablet 650 mg  650 mg Oral Q6H PRN Or  
 acetaminophen (TYLENOL) suppository 650 mg  650 mg Rectal Q6H PRN  polyethylene glycol (MIRALAX) packet 17 g  17 g Oral DAILY PRN  promethazine (PHENERGAN) tablet 12.5 mg  12.5 mg Oral Q6H PRN Or  
 ondansetron (ZOFRAN) injection 4 mg  4 mg IntraVENous Q6H PRN Lab/Data Review: 
Labs: Results:  
   
Chemistry Recent Labs  
  20 
8564 GLU 88   
K 3.5  CO2 30 BUN 23* CREA 1.13  
BUCR 20 AGAP 5  
CA 8.6 No results for input(s): TBIL, ALT, ALKP, TP, ALB, GLOB, AGRAT in the last 72 hours. No lab exists for component: SGOT CBC w/Diff Recent Labs  
  11/20/20 
0314 WBC 6.1  
RBC 3.10* HGB 9.2* HCT 30.7* MCV 99.0*  
MCH 29.7 MCHC 30.0*  
RDW 15.1*  
 GRANS 54 LYMPH 33 EOS 4 Coagulation No results for input(s): PTP, INR, APTT, INREXT, INREXT in the last 72 hours. Iron/Ferritin Lab Results Component Value Date/Time Iron 48 (L) 11/03/2020 12:55 AM  
 TIBC 232 (L) 11/03/2020 12:55 AM  
 Iron % saturation 21 11/03/2020 12:55 AM  
 Ferritin 63 11/03/2020 12:55 AM  
   
BNP Cardiac Enzymes Lab Results Component Value Date/Time CK 83 10/30/2020 08:14 AM  
 CK - MB 1.2 10/30/2020 08:14 AM  
 CK-MB Index 1.4 10/30/2020 08:14 AM  
 Troponin-I, QT 0.04 10/30/2020 08:14 AM  
 
  
Lactic Acid Thyroid Studies All Micro Results Procedure Component Value Units Date/Time CULTURE, URINE [[id number]] Collected:  11/05/20 1900 Order Status:  Completed Specimen:  Cath Urine Updated:  11/07/20 1039 Special Requests: NO SPECIAL REQUESTS [place] Count --     
  [id number] COLONIES/mL Culture result:    
  MIXED UROGENITAL ENMANUEL ISOLATED Images: 
 
CT (Most Recent). CT Results (most recent): 
Results from Hospital Encounter encounter on 10/29/20 CT HEAD WO CONT Narrative CT OF THE BRAIN 
 
COMPARISON: None. INDICATIONS: Fall and memory loss. TECHNIQUE: Volumetric data acquisition was performed   through the brain on a 
multislice scanner and reconstructed in the axial plane. FINDINGS:   
 
The ventricles and CSF spaces are enlarged consistent with age associated 
atrophy. There is no midline shift. Ronold Kanner The brain parenchyma is of generally normal attenuation. There is decreased 
attenuation in the periventricular white matter consistent with presumed 
microvascular disease. There is no hemorrhage evident. There are no pathologic fluid collections. There are no pathologic calcifications. . 
. The visible portions of the paranasal sinuses: Are clear. Impression IMPRESSION:  
 
Atrophy and microvascular disease. No acute intracranial process. All CT scans at this facility are performed using dose optimization technique as 
appropriate to the performed exam, to include automated exposure control, 
adjustment of the mA and/or kV according to patient's size (Including 
appropriate matching for site-specific examinations), or use of iterative 
reconstruction technique. MRI Results (most recent): 
Results from Hospital Encounter encounter on 10/29/20 MRI BRAIN WO CONT Narrative Brain MR without contrast 
 
HISTORY: Confusion, memory loss, hallucinations and falls. COMPARISON: CT 10/29/2020 TECHNIQUE: Brain scanned with sagittal and axial T1W scans, axial T2W , axial 
FLAIR, axial diffusion weighted images and SWAN. FINDINGS: Details are limited by mild-to-moderate motion artifact. Cerebral parenchyma: No restricted diffusion abnormalities to suggest acute 
stroke. No evidence of edema, mass effect or mass lesion. No significant T2 or FLAIR hyperintense abnormalities. Prominent symmetric susceptibility hypointense 
signal in the basal ganglia. Brain volumes and ventricular system: Normal in size and morphology for the 
patient's age. Midline structures: Normal. 
 
Cerebellum: Normal. 
 
Brainstem: Normal. 
 
Vascular system: Expected arterial flow voids are present at the base of brain. Calvarium and skull base: Normal. 
 
Paranasal sinuses and mastoid air cells: Essentially clear. Couple of tiny mucus 
retention cysts in the maxillary sinuses. Visualized orbits: Unremarkable for a nondedicated exam. 
 
Visualized upper cervical spine: Unremarkable for a nondedicated exam. 
  
 Impression IMPRESSION: 
 
1. No acute brain abnormalities. 2.  Symmetric prominent susceptibility artifact in the basal ganglia suggesting 
increased iron/mineral deposition. XRAY (Most Recent) EKG No results found for this or any previous visit. 2D ECHO 10/29/20 ECHO ADULT COMPLETE 10/31/2020 10/31/2020 Narrative · LV: Estimated LVEF is 40 - 45%. Visually measured ejection fraction. Normal cavity size. Mild concentric hypertrophy. · RV: Mildly dilated right ventricle. Mildly reduced systolic function. · PA: Mild pulmonary hypertension. Pulmonary arterial systolic pressure is 46 mmHg. · IVC: Moderately elevated central venous pressure (10-15 mmHg); IVC  
diameter is larger than 21 mm and collapses more than 50% with  
respiration.  
   
  Signed by: Allison Rhodes MD

## 2020-11-22 NOTE — PROGRESS NOTES
Problem: Falls - Risk of 
Goal: *Absence of Falls Description: Document Elisabeth Patel Fall Risk and appropriate interventions in the flowsheet. 11/21/2020 2039 by Fidel Coello Outcome: Progressing Towards Goal 
Note: Fall Risk Interventions: 
Mobility Interventions: Assess mobility with egress test 
 
Mentation Interventions: Adequate sleep, hydration, pain control, Bed/chair exit alarm, Door open when patient unattended, Family/sitter at bedside, Reorient patient, Room close to nurse's station, Toileting rounds, Update white board Medication Interventions: Evaluate medications/consider consulting pharmacy Elimination Interventions: Toileting schedule/hourly rounds, Toilet paper/wipes in reach History of Falls Interventions: Bed/chair exit alarm, Door open when patient unattended, Investigate reason for fall, Room close to nurse's station 11/21/2020 2038 by Fidel Coello Outcome: Progressing Towards Goal 
Note: Fall Risk Interventions: 
Mobility Interventions: Assess mobility with egress test 
 
Mentation Interventions: Adequate sleep, hydration, pain control, Bed/chair exit alarm, Door open when patient unattended, Family/sitter at bedside, Reorient patient, Room close to nurse's station, Toileting rounds, Update white board Medication Interventions: Evaluate medications/consider consulting pharmacy Elimination Interventions: Toileting schedule/hourly rounds, Toilet paper/wipes in reach History of Falls Interventions: Bed/chair exit alarm, Door open when patient unattended, Investigate reason for fall, Room close to nurse's station Problem: Nutrition Deficit Goal: *Optimize nutritional status 11/21/2020 2039 by Fidel Coello Outcome: Progressing Towards Goal 
11/21/2020 2038 by Fidel Coello Outcome: Progressing Towards Goal

## 2020-11-22 NOTE — ROUTINE PROCESS
Bedside shift change report given to [address] (oncoming nurse) by Lolly Pisano (offgoing nurse). Report included the following information SBAR, Procedure Summary, MAR and Recent Results.

## 2020-11-22 NOTE — ROUTINE PROCESS
Bedside shift change report given to Tal Lenoe RN (oncoming nurse) by Valentino Failing, RN (offgoing nurse). Report included the following information SBAR, Kardex, MAR and Cardiac Rhythm Afib.

## 2020-11-23 NOTE — ROUTINE PROCESS
Bedside shift change report given to Kanwal Moss (oncoming nurse) by Tiffani Pastor (offgoing nurse). Report included the following information SBAR, Procedure Summary, MAR and Recent Results.

## 2020-11-23 NOTE — DISCHARGE SUMMARY
[address], [address] DISCHARGE SUMMARY Name: Amanda Sarkar MRN: [id number] Age / Sex: 79 y.o. / male CSN: [id number] YOB: 1940 Length of Stay: 25 days Admit Date: 10/29/2020 Discharge Date: PRIMARY CARE PHYSICIAN: None DISCHARGE DIAGNOSES: 
 
1. Acute encephalopathy with visual hallucination, unclear etiology, Improved to new baseline 
     -Likely vascular dementia 2. Acute systolic heart failure, With indeterminate troponin elevation on admission 3. Paroxymal atrial fibrillation with RVR, now rate controlled Not on anticoagulation secondary to recent GI bleed 4. Hypernatremia due to lack of fluid intake,improved 5. Acute renal insufficiency in the setting of lasix use and not maintain oral intake. Hypernatremia, due to decrease oral hydrationimproved 6. GI bleed due to Duodenal ulcer, s/p cauterize with bicap, s/p EGD Duodenal ulcer with biopsy showing H.pylori infection 7. Acute anemia due to blood loss, stable Hgb/Hct With Iron deficiency anemia 9. History of fall 11. H/o tobacco abuse 12. Right lower leg chronic wound CONSULTS CALLED: GI, cardiology, neurology, psychiatry PROCEDURES DONE: EGD 
 
 
 COURSE IN THE HOSPITAL: This is a 81-year-old male who presented to the ED with confusion and some hallucinations. Patient was noted to have atrial fibrillation with RVR. Patient was admitted. There was also concern for acute  congestive heart failure exacerbation. Cardiology was consulted patient was started on diuretics. Patient was noted to have a cardiomyopathy with a EF of 40 to 45%. Patient was also noted to have some dark and bloody stools and GI was consulted. Hemoglobin and hematocrit were monitored. Patient underwent EGD which showed a duodenal ulcer which was cauterized with BiCAP. Biopsy showed H. pylori. Patient was continued on PPI and was treated with antibiotics for H. pylori. Given patient's duodenal ulcer and GI bleeding patient is not felt to be candidate for anticoagulation at this time. This can be reconsidered when the patient sees his outpatient doctors. Hemoglobin and hematocrit have remained stable. Patient had a protracted hospital stay and for details please refer to chart. Palliative care was also consulted. Patient has a chronic right lower extremity wound for which wound care was also consulted. Given patient's persistent encephalopathy neurology was also consulted. There was concern that patient may have vascular dementia. PT OT were also consulted. PT OT have recommended 24/7 supervision for the patient. Case management was involved. Discussions were held with the family. Case management has arranged for a group home with 24/7 supervision. Patient will be discharged to group home with home health care. Again patient has had a protracted hospital stay for more than 20 days and for full details please refer to chart. MEDICATIONS ON DISCHARGE: 
 
Current Discharge Medication List  
  
START taking these medications Details  
acetaminophen (TYLENOL) 325 mg tablet Take 2 Tabs by mouth every six (6) hours as needed for Pain or Fever. Qty: 30 Tab, Refills: 0  
  
folic acid (FOLVITE) 1 mg tablet Take 1 Tab by mouth daily. Qty: 30 Tab, Refills: 0  
  
furosemide (LASIX) 20 mg tablet Take 1 Tab by mouth daily. Qty: 30 Tab, Refills: 0  
  
haloperidoL (HALDOL) 5 mg tablet Take 1 Tab by mouth two (2) times a day. Qty: 60 Tab, Refills: 0  
  
melatonin 3 mg tablet Take 2 Tabs by mouth nightly. Qty: 60 Tab, Refills: 0  
  
metoprolol tartrate (LOPRESSOR) 50 mg tablet Take 1 Tab by mouth every twelve (12) hours. Indications: paroxysmal supraventricular tachycardia Qty: 60 Tab, Refills: 0  
  
pantoprazole (PROTONIX) 40 mg tablet Take 1 Tab by mouth two (2) times a day. Qty: 60 Tab, Refills: 0  
  
polyethylene glycol (MIRALAX) 17 gram packet Take 1 Packet by mouth daily as needed for Constipation. Qty: 15 Packet, Refills: 0  
  
sucralfate (CARAFATE) 1 gram tablet Take 1 Tab by mouth Before breakfast, lunch, dinner and at bedtime for 10 days. Indications: an ulcer of the duodenum 
Qty: 40 Tab, Refills: 0  
  
therapeutic multivitamin (THERAGRAN) tablet Take 1 Tab by mouth daily. Qty: 30 Tab, Refills: 0  
  
thiamine HCL (B-1) 100 mg tablet Take 1 Tab by mouth daily. Qty: 30 Tab, Refills: 0  
  
OTHER Check a CBC, CMP, magnesium in 4 days, results to PCP immediately, diagnosis is GI bleed 
Qty: 1 Each, Refills: 0 DISCHARGE PHYSICAL EXAMINATION: 
General:  Awake, pleasantly confused Cardiovascular:  S1S2+, RRR Pulmonary:  CTA b/l GI:  Soft, BS+, NT, ND Extremities:  No edema CONDITION ON DISCHARGE: Stable. DISPOSITION: Group home with 24/7 supervision with home health care FOLLOW-UP RECOMMENDATIONS:  
Follow-up Information Follow up With Specialties Details Why Contact Info None    None ([phone]) Patient stated that they have no PCP 
  
  
 
 
OTHER INSTRUCTIONS: 
Diet- Cardiac Ensure Enlive, 1 p.o. 3 times daily Activity - as tolerated FALL PRECAUTIONS 
ASPIRATION PRECAUTIONS DECUBITUS PRECAUTION 
 Incentive Spirometry Q30 minutes when awake PT, OT Evaluate and Treat Check CBC, CMP, Mg in 3 days, results to PCP immediately F/u with PCP in 1 week F/u with cardiology in 2 weeks Follow-up with GI in 2 weeks,  
follow-up with neurology in 2 weeks TIME SPENT ON DISCHARGE ACTIVITIES: More than 35 minutes. Dragon medical dictation software was used for portions of this report. Unintended errors may occur. Signed:  Satnam Morillo MD 
 
  11/23/2020

## 2020-11-23 NOTE — PROGRESS NOTES
conducted a Follow up consultation and Spiritual Assessment for Shannan Alcazar, who is a 79 y.o.,male. The  provided the following Interventions: 
Continued the relationship of care and support. Listened empathically. Offered prayer and assurance of continued prayer on patients behalf. Chart reviewed. The following outcomes were achieved: Affirmed presence of God and God's forgiveness. Patient expressed gratitude for 's visit. Assessment: 
There are no further spiritual or Protestant issues which require Spiritual Care Services interventions at this time. Plan: 
Chaplains will continue to follow and will provide pastoral care on an as needed/requested basis.  recommends bedside caregivers page  on duty if patient shows signs of acute spiritual or emotional distress. [address] [place] [address]. Spiritual Care  
[phone]

## 2020-11-23 NOTE — PROGRESS NOTES
Per voicemail from Adilia Ferreira the physician reviewer agrees with the discharge and financial liability begins today, November 23, 2020. Abdelrahman Medellin, MSN, RN, ACM-RN Care Management [phone]

## 2020-11-23 NOTE — PROGRESS NOTES
Received call from [facility] that a reconsideration request has been filed on behalf of the patient. Janelle Hernandez, MSN, RN, ACM-RN Care Management [phone]

## 2020-11-23 NOTE — PROGRESS NOTES
Discharge order noted for today. Pt has been accepted to [facility] agency. Met with patient and daughters and are agreeable to the transition plan today. Transport has been arranged through daughters. Patient's discharge summary and home health  orders have been forwarded to [facility] home health  agency via [employer]. Updated bedside RN, Stacie Hickey,  to the transition plan. Discharge information has been documented on the AVS.  
 
Group home can accept pt and will provide 24 hours supervision. Ms Cindy Sweeney address is [place] [address] [address], [place]. Daughter Dania Ward will call this writer in the am with choice of PCP. Family wanted to choose. Ann Stinson RN BSN Care Manager [phone]

## 2020-11-23 NOTE — DISCHARGE INSTRUCTIONS
Patient Education        Atrial Fibrillation: Care Instructions  Your Care Instructions     Atrial fibrillation is an irregular and often fast heartbeat. Treating this condition is important for several reasons. It can cause blood clots, which can travel from your heart to your brain and cause a stroke. If you have a fast heartbeat, you may feel lightheaded, dizzy, and weak. An irregular heartbeat can also increase your risk for heart failure. Atrial fibrillation is often the result of another heart condition, such as high blood pressure or coronary artery disease. Making changes to improve your heart condition will help you stay healthy and active. Follow-up care is a key part of your treatment and safety. Be sure to make and go to all appointments, and call your doctor if you are having problems. It's also a good idea to know your test results and keep a list of the medicines you take. How can you care for yourself at home? Medicines    · Take your medicines exactly as prescribed. Call your doctor if you think you are having a problem with your medicine. You will get more details on the specific medicines your doctor prescribes.     · If your doctor has given you a blood thinner to prevent a stroke, be sure you get instructions about how to take your medicine safely. Blood thinners can cause serious bleeding problems.     · Do not take any vitamins, over-the-counter drugs, or herbal products without talking to your doctor first.   Lifestyle changes    · Do not smoke. Smoking can increase your chance of a stroke and heart attack. If you need help quitting, talk to your doctor about stop-smoking programs and medicines. These can increase your chances of quitting for good.     · Eat a heart-healthy diet.     · Stay at a healthy weight. Lose weight if you need to.     · Limit alcohol to 2 drinks a day for men and 1 drink a day for women. Too much alcohol can cause health problems.     · Avoid colds and flu.  Get a pneumococcal vaccine shot. If you have had one before, ask your doctor whether you need another dose. Get a flu shot every year. If you must be around people with colds or flu, wash your hands often. Activity    · If your doctor recommends it, get more exercise. Walking is a good choice. Bit by bit, increase the amount you walk every day. Try for at least 30 minutes on most days of the week. You also may want to swim, bike, or do other activities. Your doctor may suggest that you join a cardiac rehabilitation program so that you can have help increasing your physical activity safely.     · Start light exercise if your doctor says it is okay. Even a small amount will help you get stronger, have more energy, and manage stress. Walking is an easy way to get exercise. Start out by walking a little more than you did in the hospital. Gradually increase the amount you walk.     · When you exercise, watch for signs that your heart is working too hard. You are pushing too hard if you cannot talk while you are exercising. If you become short of breath or dizzy or have chest pain, sit down and rest immediately.     · Check your pulse regularly. Place two fingers on the artery at the palm side of your wrist, in line with your thumb. If your heartbeat seems uneven or fast, talk to your doctor. When should you call for help? Call 911 anytime you think you may need emergency care. For example, call if:    · You have symptoms of a heart attack. These may include:  ? Chest pain or pressure, or a strange feeling in the chest.  ? Sweating. ? Shortness of breath. ? Nausea or vomiting. ? Pain, pressure, or a strange feeling in the back, neck, jaw, or upper belly or in one or both shoulders or arms. ? Lightheadedness or sudden weakness. ? A fast or irregular heartbeat. After you call 911, the  may tell you to chew 1 adult-strength or 2 to 4 low-dose aspirin. Wait for an ambulance.  Do not try to drive yourself.     · You have symptoms of a stroke. These may include:  ? Sudden numbness, tingling, weakness, or loss of movement in your face, arm, or leg, especially on only one side of your body. ? Sudden vision changes. ? Sudden trouble speaking. ? Sudden confusion or trouble understanding simple statements. ? Sudden problems with walking or balance. ? A sudden, severe headache that is different from past headaches.     · You passed out (lost consciousness). Call your doctor now or seek immediate medical care if:    · You have new or increased shortness of breath.     · You feel dizzy or lightheaded, or you feel like you may faint.     · Your heart rate becomes irregular.     · You can feel your heart flutter in your chest or skip heartbeats. Tell your doctor if these symptoms are new or worse. Watch closely for changes in your health, and be sure to contact your doctor if you have any problems. Where can you learn more? Go to http://www.gray.com/  Enter U020 in the search box to learn more about \"Atrial Fibrillation: Care Instructions. \"  Current as of: December 16, 2019               Content Version: 12.6  © 8393-4493 [employer]. Care instructions adapted under license by [employer] (which disclaims liability or warranty for this information). If you have questions about a medical condition or this instruction, always ask your healthcare professional. [address] any warranty or liability for your use of this information. Patient Education        Learning About Atrial Fibrillation  What is atrial fibrillation? Atrial fibrillation (say \"AY-tree-lilly yfk-taam-DIF-shun\") is the most common type of irregular heartbeat (arrhythmia). Normally, the heart beats in a strong, steady rhythm. In atrial fibrillation, a problem with the heart's electrical system causes the two upper parts of the heart (the atria) to quiver, or fibrillate.  Your heart rate also may be faster than normal.  Atrial fibrillation can be dangerous because if the heartbeat isn't strong and steady, blood can collect, or pool, in the atria. And pooled blood is more likely to form clots. Clots can travel to the brain, block blood flow, and cause a stroke. Atrial fibrillation can also lead to heart failure. Treatment for atrial fibrillation helps prevent stroke and heart failure. It also helps relieve symptoms. Atrial fibrillation is often caused by another heart problem. It may happen after heart surgery. It may also be caused by other problems, such as an overactive thyroid gland or lung disease. Many people with atrial fibrillation are able to live full and active lives. What are the symptoms? Some people feel symptoms when they have episodes of atrial fibrillation. But other people don't notice any symptoms. If you have symptoms, you may feel:  · A fluttering, racing, or pounding feeling in your chest called palpitations. · Weak or tired. · Dizzy or lightheaded. · Short of breath. · Chest pain. · Confused. You may notice signs of atrial fibrillation when you check your pulse. Your pulse may seem uneven or fast.  What can you expect when you have atrial fibrillation? At first, spells of atrial fibrillation may come on suddenly and last a short time. It may go away on its own or it goes away after treatment. This is called paroxysmal atrial fibrillation. Over time, the spells may last longer and occur more often. They often don't go away on their own. How is it treated? Treatments can help you feel better and prevent future problems, especially stroke and heart failure. The main types of treatment slow the heart rate, control the heart rhythm, and help prevent stroke. Your treatment will depend on the cause of your atrial fibrillation, your symptoms, and your risk for stroke. · Heart rate treatment. Medicine may be used to slow your heart rate.  Your heartbeat may still be irregular. But these medicines keep your heart from beating too fast. They may also help relieve your symptoms. · Heart rhythm treatment. Different treatments may be used to try to stop atrial fibrillation and keep it from returning. They can also relieve symptoms. These treatments include medicine, electrical cardioversion to shock the heart back to a normal rhythm, a procedure called catheter ablation, and heart surgery. · Stroke prevention. You and your doctor can decide how to lower your risk. You may decide to take a blood-thinning medicine called an anticoagulant. How can you live well with it? You can live well and help manage atrial fibrillation by having a heart-healthy lifestyle. This lifestyle may help reduce how often you have episodes of atrial fibrillation. If you are overweight, losing weight can help relieve symptoms. To have a heart-healthy lifestyle:  · Don't smoke. · Eat heart-healthy foods. · Be active. Talk to your doctor about what type and level of exercise is safe for you. · Stay at a healthy weight. Lose weight if you need to. · Avoid alcohol if it triggers symptoms. · Manage other health problems such as high blood pressure, high cholesterol, and diabetes. · Avoid getting sick from the flu. Get a flu shot every year. · Manage stress. Where can you learn more? Go to http://www.gray.com/  Enter L274 in the search box to learn more about \"Learning About Atrial Fibrillation. \"  Current as of: December 16, 2019               Content Version: 12.6  © 8294-3124 [employer]. Care instructions adapted under license by [employer] (which disclaims liability or warranty for this information). If you have questions about a medical condition or this instruction, always ask your healthcare professional. [place] [address] any warranty or liability for your use of this information.          DISCHARGE SUMMARY from Nurse    PATIENT INSTRUCTIONS:    After general anesthesia or intravenous sedation, for 24 hours or while taking prescription Narcotics:  · Limit your activities  · Do not drive and operate hazardous machinery  · Do not make important personal or business decisions  · Do  not drink alcoholic beverages  · If you have not urinated within 8 hours after discharge, please contact your surgeon on call. Report the following to your surgeon:  · Excessive pain, swelling, redness or odor of or around the surgical area  · Temperature over 100.5  · Nausea and vomiting lasting longer than 4 hours or if unable to take medications  · Any signs of decreased circulation or nerve impairment to extremity: change in color, persistent  numbness, tingling, coldness or increase pain  · Any questions    What to do at Home:  Recommended activity: Activity as tolerated,     If you experience any of the following symptoms nausea, vomiting, rapid heart beat, dizziness, chest pain, shortness of Breathi,please follow up with PCP and If its emergent please come to the emergency room. *  Please give a list of your current medications to your Primary Care Provider. *  Please update this list whenever your medications are discontinued, doses are      changed, or new medications (including over-the-counter products) are added. *  Please carry medication information at all times in case of emergency situations. These are general instructions for a healthy lifestyle:    No smoking/ No tobacco products/ Avoid exposure to second hand smoke  Surgeon General's Warning:  Quitting smoking now greatly reduces serious risk to your health.     Obesity, smoking, and sedentary lifestyle greatly increases your risk for illness    A healthy diet, regular physical exercise & weight monitoring are important for maintaining a healthy lifestyle    You may be retaining fluid if you have a history of heart failure or if you experience any of the following symptoms:  Weight gain of 3 pounds or more overnight or 5 pounds in a week, increased swelling in our hands or feet or shortness of breath while lying flat in bed. Please call your doctor as soon as you notice any of these symptoms; do not wait until your next office visit. The discharge information has been reviewed with the patient. The patient verbalized understanding. Discharge medications reviewed with the patient and appropriate educational materials and side effects teaching were provided.   ___________________________________________________________________________________________________________________________________    Patient armband removed and shredded

## 2020-11-23 NOTE — PROGRESS NOTES
Pt does not understand all the discharge orders but his daughters are present and understand all orders given. Prescriptions are given to daughters' to be filled. All pt belongings are packed and pt will be discharged with his daughters.

## 2020-11-23 NOTE — PROGRESS NOTES
Problem: Falls - Risk of 
Goal: *Absence of Falls Description: Document Antonio Going Fall Risk and appropriate interventions in the flowsheet. Outcome: Progressing Towards Goal 
Note: Fall Risk Interventions: 
Mobility Interventions: Bed/chair exit alarm, Patient to call before getting OOB Mentation Interventions: Bed/chair exit alarm, Door open when patient unattended, Family/sitter at bedside, More frequent rounding, Reorient patient, Room close to nurse's station, Toileting rounds, Update white board Medication Interventions: Evaluate medications/consider consulting pharmacy Elimination Interventions: Toileting schedule/hourly rounds, Toilet paper/wipes in reach, Bed/chair exit alarm History of Falls Interventions: Door open when patient unattended, Room close to nurse's station Problem: Nutrition Deficit Goal: *Optimize nutritional status Outcome: Progressing Towards Goal 
  
Problem: Pressure Injury - Risk of 
Goal: *Prevention of pressure injury Description: Document Rafa Scale and appropriate interventions in the flowsheet. Outcome: Progressing Towards Goal 
Note: Pressure Injury Interventions: 
Sensory Interventions: Assess changes in LOC, Minimize linen layers, Keep linens dry and wrinkle-free Moisture Interventions: Absorbent underpads, Check for incontinence Q2 hours and as needed Activity Interventions: Pressure redistribution bed/mattress(bed type), Increase time out of bed Mobility Interventions: HOB 30 degrees or less Nutrition Interventions: Document food/fluid/supplement intake, Offer support with meals,snacks and hydration Friction and Shear Interventions: Foam dressings/transparent film/skin sealants, Apply protective barrier, creams and emollients, Minimize layers, HOB 30 degrees or less

## 2020-11-24 NOTE — PROGRESS NOTES
11/24/2020 10:08 AM  
 
CTN attempted to contact patient for hospital follow up. Patient admitted to [facility] on 10/29/2020 and discharged on 11/23/2020 for A-fib with rapid ventricular rate and confusion with hallucinations. Called patient number and phone just rang and rang. Unable to leave a message. Message left introducing myself, the purpose of the call and giving my contact information on each of emergency contact numbers. Requested that emergency contacts have patient call back at his earliest convenience. Will follow.

## 2020-11-24 NOTE — PROGRESS NOTES
Patient has transitional care follow up Dr. Pete Easley on 12/7/2020 at 2:15 pm, this is a Virtual Visit. Ms. Jorge Hollis at [facility] made aware of virtual appointment. Dr. Frias Rosi office given updated cell phone number for virtual appointment.

## 2020-11-24 NOTE — HOME CARE
Received HH referral this morning,patient discharged yesterday 11/23;   [facility]) was working on getting a PCP appt, also spoke to patients daughter Nabila Estevez ) ,she states she has arranged for a PCP for patient with Dr Leatha Lara on 12/8/20 at 2pm, patients sister states she does not want Dr Natividad Saavedra for patients PCP; patient lives in group home at [address] [place] [address] ,[facility],[id number] ,daughter states patient has 2 caregivers 24/7 (Colette and Courtney); [facility] referral processed to [facility] central intake for SN,wound care,telehealth,PT,OT,HHA and labs . SHAKA ENAMORADO LPN.

## 2020-11-25 NOTE — HOME CARE
Notified  (Collette) this morning that patient's sister has set up PCP with Dr Vale Devine, she does not want appt with Dr Ayanna Matta, per  she will call to cancel appt with Dr Ayanna Matta . ELENO CARR.

## 2020-11-30 PROBLEM — R45.1 AGITATION: Status: ACTIVE | Noted: 2020-01-01

## 2020-11-30 PROBLEM — G47.00 INSOMNIA: Status: ACTIVE | Noted: 2020-01-01

## 2020-11-30 PROBLEM — G96.9: Status: ACTIVE | Noted: 2020-01-01

## 2020-12-07 NOTE — TELEPHONE ENCOUNTER
Called patient to get him ready for his 2:15 VV was told he is currently in patient at [facility].

## 2020-12-08 NOTE — PROGRESS NOTES
128/2020 4:24 PM    Reviewed patient chart. Noted that he was still inpatient at [facility]. Did not call patient today. Will follow and call after patient is discharged.

## 2020-12-11 NOTE — PROGRESS NOTES
12/11/2020 1:23 PM 
 
Reviewed patient chart. Noted that kelly was still an inpatient at [facility]. Did not call patient today. Will follow and contact patient once discharged.

## 2020-12-15 NOTE — PROGRESS NOTES
12/15/2020 1:45 PM 
 
Reviewed patient chart. Noted that kelly was still an inpatient at [facility]. Did not call patient today. Will follow and contact patient once discharged.

## 2020-12-18 NOTE — PROGRESS NOTES
12/18/2020 1:05 PM 
 
Reviewed patient chart. Noted that kelly was still an inpatient at [facility]. Did not call patient today. Will follow and contact patient once discharged.

## 2020-12-21 ENCOUNTER — HOME CARE VISIT (OUTPATIENT)
Dept: HOME HEALTH SERVICES | Facility: HOME HEALTH | Age: 80
End: 2020-12-21
Payer: MEDICARE

## 2020-12-23 ENCOUNTER — PATIENT OUTREACH (OUTPATIENT)
Dept: CASE MANAGEMENT | Age: 80
End: 2020-12-23

## 2020-12-23 NOTE — PROGRESS NOTES
12/23/2020 11:59 AM 
 
Reviewed chart to see if he had been discharged from hospital and noted that patient had passed away. Will resolve episode.

## 2021-06-13 NOTE — PROGRESS NOTES
Patient is a 63y old  Male who presents with a chief complaint of dfu (13 Jun 2021 04:22)        Over Night Events:  Remains on MV.  Sedated.       ROS:     All ROS are negative except HPI         PHYSICAL EXAM    ICU Vital Signs Last 24 Hrs  T(C): 39.6 (13 Jun 2021 21:00), Max: 39.7 (13 Jun 2021 18:00)  T(F): 103.3 (13 Jun 2021 21:00), Max: 103.5 (13 Jun 2021 18:00)  HR: 64 (13 Jun 2021 21:00) (64 - 120)  BP: 102/47 (13 Jun 2021 21:00) (97/47 - 177/78)  BP(mean): 64 (13 Jun 2021 21:00) (60 - 123)  ABP: --  ABP(mean): --  RR: 18 (13 Jun 2021 21:00) (17 - 34)  SpO2: 99% (13 Jun 2021 21:00) (96% - 100%)      CONSTITUTIONAL:  Well nourished.  NAD    ENT:   Airway patent,   Mouth with normal mucosa.   No thrush    EYES:   Pupils equal,   Round and reactive to light.    CARDIAC:   Normal rate,   Regular rhythm.    No edema      Vascular:  Normal systolic impulse  No Carotid bruits    RESPIRATORY:   No wheezing  Bilateral BS  Normal chest expansion  Not tachypneic,  No use of accessory muscles    GASTROINTESTINAL:  Abdomen soft,   Non-tender,   No guarding,   + BS    MUSCULOSKELETAL:   Range of motion is not limited,  No clubbing, cyanosis    NEUROLOGICAL:   Sedated   No motor  deficits.    SKIN:   Skin normal color for race,   Warm and dry   No evidence of rash.    PSYCHIATRIC:   Sedated   No apparent risk to self or others.    HEMATOLOGICAL:  No cervical  lymphadenopathy.  no inguinal lymphadenopathy      06-12-21 @ 07:01  -  06-13-21 @ 07:00  --------------------------------------------------------  IN:    FentaNYL: 789.6 mL    Free Water: 1500 mL    IV PiggyBack: 300 mL    Norepinephrine: 55.7 mL    Propofol: 296 mL    Vital High Protein: 1237.5 mL  Total IN: 4178.8 mL    OUT:    Indwelling Catheter - Urethral (mL): 2320 mL  Total OUT: 2320 mL    Total NET: 1858.8 mL      06-13-21 @ 07:01  -  06-13-21 @ 21:47  --------------------------------------------------------  IN:    Dexmedetomidine: 32.8 mL    Dexmedetomidine: 194 mL    dextrose 5%: 900 mL    Enteral Tube Flush: 40 mL    FentaNYL: 408.6 mL    Free Water: 1000 mL    IV PiggyBack: 350 mL    Norepinephrine: 9 mL    Propofol: 20 mL    Vital High Protein: 605 mL  Total IN: 3559.4 mL    OUT:    Indwelling Catheter - Urethral (mL): 1775 mL  Total OUT: 1775 mL    Total NET: 1784.4 mL          LABS:                            8.2    11.35 )-----------( 253      ( 13 Jun 2021 04:35 )             27.6                                               06-13    148<H>  |  114<H>  |  45<H>  ----------------------------<  233<H>  4.5   |  28  |  1.7<H>    Ca    7.7<L>      13 Jun 2021 16:34  Phos  2.9     06-13  Mg     2.7     06-13    TPro  7.5  /  Alb  2.4<L>  /  TBili  0.4  /  DBili  x   /  AST  90<H>  /  ALT  68<H>  /  AlkPhos  125<H>  06-13                                                 CARDIAC MARKERS ( 13 Jun 2021 04:35 )  x     / x     / 641 U/L / x     / x      CARDIAC MARKERS ( 12 Jun 2021 04:30 )  x     / x     / 485 U/L / x     / x                                                LIVER FUNCTIONS - ( 13 Jun 2021 04:35 )  Alb: 2.4 g/dL / Pro: 7.5 g/dL / ALK PHOS: 125 U/L / ALT: 68 U/L / AST: 90 U/L / GGT: x                                                                                               Mode: AC/ CMV (Assist Control/ Continuous Mandatory Ventilation)  RR (machine): 18  TV (machine): 480  FiO2: 30  PEEP: 5                                      ABG - ( 13 Jun 2021 09:33 )  pH, Arterial: 7.35  pH, Blood: x     /  pCO2: 56    /  pO2: 75    / HCO3: 31    / Base Excess: 4.3   /  SaO2: 94                  MEDICATIONS  (STANDING):  ALBUTerol    90 MICROgram(s) HFA Inhaler 1 Puff(s) Inhalation once  caspofungin IVPB 50 milliGRAM(s) IV Intermittent every 24 hours  chlorhexidine 0.12% Liquid 15 milliLiter(s) Oral Mucosa two times a day  chlorhexidine 4% Liquid 1 Application(s) Topical <User Schedule>  DAPTOmycin IVPB 875 milliGRAM(s) IV Intermittent every 48 hours  dexMEDEtomidine Infusion 0.2 MICROgram(s)/kG/Hr (8.21 mL/Hr) IV Continuous <Continuous>  fentaNYL   Infusion. 0.5 MICROgram(s)/kG/Hr (8.21 mL/Hr) IV Continuous <Continuous>  glucagon  Injectable 1 milliGRAM(s) IntraMuscular once  heparin   Injectable 5000 Unit(s) SubCutaneous every 8 hours  insulin glargine Injectable (LANTUS) 18 Unit(s) SubCutaneous at bedtime  insulin lispro (ADMELOG) corrective regimen sliding scale   SubCutaneous every 6 hours  insulin lispro Injectable (ADMELOG) 5 Unit(s) SubCutaneous every 6 hours  meropenem  IVPB 1000 milliGRAM(s) IV Intermittent every 12 hours  pantoprazole   Suspension 40 milliGRAM(s) Oral daily  polyethylene glycol 3350 17 Gram(s) Oral daily  senna 2 Tablet(s) Oral at bedtime  tiotropium 18 MICROgram(s) Capsule 1 Capsule(s) Inhalation once    MEDICATIONS  (PRN):  acetaminophen   Tablet .. 650 milliGRAM(s) Oral every 6 hours PRN Temp greater or equal to 38C (100.4F), Mild Pain (1 - 3)      New X-rays reviewed:                                                                                  ECHO    CXR interpreted by me:  ET OK no infiltrate      Problem: Falls - Risk of 
Goal: *Absence of Falls Outcome: Progressing Towards Goal 
Note: Fall Risk Interventions: 
Mobility Interventions: Bed/chair exit alarm Mentation Interventions: Bed/chair exit alarm Medication Interventions: Evaluate medications/consider consulting pharmacy Elimination Interventions: Toilet paper/wipes in reach History of Falls Interventions: Door open when patient unattended Problem: Nutrition Deficit Goal: *Optimize nutritional status Outcome: Progressing Towards Goal 
  
Problem: Pressure Injury - Risk of 
Goal: *Prevention of pressure injury Outcome: Progressing Towards Goal 
Note: Pressure Injury Interventions: 
Sensory Interventions: Assess changes in LOC Moisture Interventions: Check for incontinence Q2 hours and as needed Activity Interventions: Increase time out of bed Mobility Interventions: HOB 30 degrees or less Nutrition Interventions: Document food/fluid/supplement intake Friction and Shear Interventions: Foam dressings/transparent film/skin sealants

## 2021-10-20 NOTE — PROGRESS NOTES
Multilayer Compression Wrap   Below the Knee    NAME:  Jules Schaefer  YOB: 1934  MEDICAL RECORD NUMBER:  [id number]  DATE:  10/20/2021    Multilayer compression wrap: Removed old Multilayer wrap if indicated and wash leg with mild soap/water. Applied moisturizing agent to dry skin as needed. Applied primary and secondary dressing as ordered. Applied multilayered dressing below the knee to right lower leg. Instructed patient/caregiver not to remove dressing and to keep it clean and dry. Instructed patient/caregiver on complications to report to provider, such as pain, numbness in toes, heavy drainage, and slippage of dressing. Instructed patient on purpose of compression dressing and on activity and exercise recommendations.      Applied  [employer] wrap from toes to knee overlapping each time  Applied mepitel and dry dressing  Electronically signed by Ce Villarreal RN on 10/20/2021 at 11:59 AM [facility] [phone] ([phone]) [facility]: [phone] Goals of care defined. Mr. Marianela Cramer was able to complete an AMD last week naming his daughters and primary and surrogate healthcare decision makers. At this time pt is only self, becoming restless and constant redirection needed. Palliative team has had many conversations with pts daughters, Sandra Saravia and Lionel Cancer on the benefits and burdens of CPR and intubation. Also provided family with printed information on CRP and intubation. They want to pursue full aggressive treatment. They believe their father will be able to benefit from physical and occupational therapy. Palliative team will sign off. Please consult team as needed, if appropriate. Thank you for the Palliative Medicine consult and allowing us to participate in the care of Mr. Marianela Cramer. CODE STATUS- FULL CODE FULL INTERVENTIONS. Next of [place] [id number] care Agent - Julio Salazar Surrogate health Care agent  Rigoberto Antoine. Laureen Ordoñez BSN, RN, [facility] Palliative Medicine Inpatient RN  [facility] Pall[facility] Line: [phone] ([phone])

## (undated) DEVICE — SYR 50ML SLIP TIP NSAF LF STRL --

## (undated) DEVICE — MEDI-VAC SUCTION HIGH CAPACITY: Brand: CARDINAL HEALTH

## (undated) DEVICE — AIRLIFE™ NASAL OXYGEN CANNULA CURVED, FLARED TIP WITH 14 FOOT (4.3 M) CRUSH-RESISTANT TUBING, OVER-THE-EAR STYLE: Brand: AIRLIFE™

## (undated) DEVICE — GAUZE,SPONGE,4"X4",16PLY,STRL,LF,10/TRAY: Brand: MEDLINE

## (undated) DEVICE — FLUFF AND POLYMER UNDERPAD,EXTRA HEAVY: Brand: WINGS

## (undated) DEVICE — STERILE POLYISOPRENE POWDER-FREE SURGICAL GLOVES: Brand: PROTEXIS

## (undated) DEVICE — CATHETER SUCT TR FL TIP 14FR W/ O CTRL

## (undated) DEVICE — FLEX ADVANTAGE 3000CC: Brand: FLEX ADVANTAGE

## (undated) DEVICE — BIPOLAR ELECTROHEMOSTASIS CATHETER: Brand: INJECTION GOLD PROBE

## (undated) DEVICE — MEDI-VAC NON-CONDUCTIVE SUCTION TUBING: Brand: CARDINAL HEALTH

## (undated) DEVICE — NDL INJ SCLERO 25G 240CM -- INTERJECT M00518360 BX/5

## (undated) DEVICE — ENDOSCOPY PUMP TUBING/ CAP SET: Brand: ERBE

## (undated) DEVICE — SOLUTION IRRIG 1000ML H2O STRL BLT

## (undated) DEVICE — BITE BLOCK ENDOSCP UNIV AD 6 TO 9.4 MM

## (undated) DEVICE — BASIN EMESIS 500CC ROSE 250/CS 60/PLT: Brand: MEDEGEN MEDICAL PRODUCTS, LLC

## (undated) DEVICE — SYRINGE MED 25GA 3ML L5/8IN SUBQ PLAS W/ DETACH NDL SFTY